# Patient Record
Sex: FEMALE | Race: BLACK OR AFRICAN AMERICAN | Employment: FULL TIME | URBAN - METROPOLITAN AREA
[De-identification: names, ages, dates, MRNs, and addresses within clinical notes are randomized per-mention and may not be internally consistent; named-entity substitution may affect disease eponyms.]

---

## 2017-11-27 ENCOUNTER — HOSPITAL ENCOUNTER (OUTPATIENT)
Dept: MRI IMAGING | Age: 47
Discharge: HOME | End: 2017-11-27
Attending: INTERNAL MEDICINE
Payer: OTHER GOVERNMENT

## 2017-11-27 DIAGNOSIS — M25.569 KNEE PAIN: ICD-10-CM

## 2017-11-27 DIAGNOSIS — M25.469 KNEE EFFUSION: ICD-10-CM

## 2017-11-27 PROCEDURE — 73721 MRI JNT OF LWR EXTRE W/O DYE: CPT

## 2017-12-05 ENCOUNTER — OFFICE VISIT (OUTPATIENT)
Dept: ORTHOPEDIC SURGERY | Age: 47
End: 2017-12-05

## 2017-12-05 VITALS
WEIGHT: 200 LBS | HEIGHT: 64 IN | HEART RATE: 102 BPM | OXYGEN SATURATION: 100 % | TEMPERATURE: 97.1 F | BODY MASS INDEX: 34.15 KG/M2 | RESPIRATION RATE: 18 BRPM | DIASTOLIC BLOOD PRESSURE: 86 MMHG | SYSTOLIC BLOOD PRESSURE: 128 MMHG

## 2017-12-05 DIAGNOSIS — M17.11 PRIMARY OSTEOARTHRITIS OF RIGHT KNEE: Primary | ICD-10-CM

## 2017-12-05 RX ORDER — THYROID, PORCINE 90 MG/1
90 TABLET ORAL DAILY
COMMUNITY
Start: 2017-11-15

## 2017-12-05 RX ORDER — METOPROLOL SUCCINATE 200 MG/1
200 TABLET, EXTENDED RELEASE ORAL DAILY
COMMUNITY
Start: 2017-10-18

## 2017-12-05 RX ORDER — ALBUTEROL SULFATE 90 UG/1
AEROSOL, METERED RESPIRATORY (INHALATION)
COMMUNITY
Start: 2017-08-30

## 2017-12-05 RX ORDER — AMLODIPINE BESYLATE 10 MG/1
10 TABLET ORAL DAILY
COMMUNITY
Start: 2017-11-30

## 2017-12-05 RX ORDER — TRIAMCINOLONE ACETONIDE 40 MG/ML
40 INJECTION, SUSPENSION INTRA-ARTICULAR; INTRAMUSCULAR ONCE
Qty: 1 ML | Refills: 0
Start: 2017-12-05 | End: 2017-12-05

## 2017-12-05 RX ORDER — LIRAGLUTIDE 6 MG/ML
INJECTION SUBCUTANEOUS
COMMUNITY
Start: 2017-11-15 | End: 2020-07-28

## 2017-12-05 RX ORDER — METFORMIN HYDROCHLORIDE 1000 MG/1
1000 TABLET ORAL 2 TIMES DAILY
COMMUNITY
Start: 2017-08-30

## 2017-12-05 RX ORDER — MELOXICAM 15 MG/1
15 TABLET ORAL
Qty: 30 TAB | Refills: 1 | Status: SHIPPED | OUTPATIENT
Start: 2017-12-05 | End: 2020-07-28

## 2017-12-05 RX ORDER — CELECOXIB 200 MG/1
CAPSULE ORAL
COMMUNITY
Start: 2017-11-07 | End: 2020-07-28

## 2017-12-05 RX ORDER — FLUTICASONE PROPIONATE AND SALMETEROL 50; 250 UG/1; UG/1
1 POWDER RESPIRATORY (INHALATION) 2 TIMES DAILY
COMMUNITY
Start: 2017-08-30

## 2017-12-05 NOTE — PROGRESS NOTES
Paulina Barber  1970   Chief Complaint   Patient presents with    Knee Pain     Right        HISTORY OF PRESENT ILLNESS  Paulina Barber is a 51 y.o. female who presents today for evaluation of right knee pain. she rates her pain 5/10 today. Pain has been present since October. Patient describes initial pain occurring while participating in a dance exercise class. She was initially seen at the [facility]. Patient has completed an MRI of the right knee. Patient describes the pain as aching that is Intermittent in nature. Symptoms are worse with Activity, Exercise and is better with  Rest. Associated symptoms include, Swelling. Since problem started, it: is unchanged. Pain does not wake patient up at night. Has taken no pain medications for the problem. H/o right knee lateral release. Patient had a steroid injection in her arm last week. Has tried following treatments: Injections:NO; Brace:NO; Therapy:NO; Cane/Crutch:NO       Allergies   Allergen Reactions    Ace Inhibitors Angioedema    Doxycycline Hives        Past Medical History:   Diagnosis Date    Diabetes ([facility] [address]     Hypertension       Social History     Social History    Marital status: UNKNOWN     Spouse name: N/A    Number of children: N/A    Years of education: N/A     Occupational History    Not on file.      Social History Main Topics    Smoking status: Never Smoker    Smokeless tobacco: Never Used    Alcohol use Yes      Comment: occasionally    Drug use: No    Sexual activity: Not on file     Other Topics Concern    Not on file     Social History Narrative      Past Surgical History:   Procedure Laterality Date    HX ANKLE FRACTURE TX      HX KNEE ARTHROSCOPY        Family History   Problem Relation Age of Onset    Breast Cancer Other 27    Breast Cancer Maternal Aunt 61    Ovarian Cancer Maternal Grandmother 36      Current Outpatient Prescriptions   Medication Sig    VENTOLIN HFA 90 mcg/actuation inhaler     amLODIPine (NORVASC) 5 mg tablet     celecoxib (CELEBREX) 200 mg capsule     ADVAIR DISKUS 250-50 mcg/dose diskus inhaler     VICTOZA 3-HEAVEN 0.6 mg/0.1 mL (18 mg/3 mL) pnij     metFORMIN ER (GLUCOPHAGE XR) 500 mg tablet     metoprolol succinate (TOPROL-XL) 200 mg XL tablet     ARMOUR THYROID 15 mg tablet 45 mg.    meloxicam (MOBIC) 15 mg tablet Take 1 Tab by mouth daily (with breakfast).  triamcinolone acetonide (KENALOG) 40 mg/mL injection 1 mL by IntraMUSCular route once for 1 dose. No current facility-administered medications for this visit. REVIEW OF SYSTEM   Patient denies: Weight loss, Fever/Chills, HA, Visual changes, Fatigue, Chest pain, SOB, Abdominal pain, N/V/D/C, Blood in stool or urine, Edema. Pertinent positive as above in HPI. All others were negative    PHYSICAL EXAM:   Visit Vitals    /86    Pulse (!) 102    Temp 97.1 °F (36.2 °C) (Oral)    Resp 18    Ht 5' 4\" (1.626 m)    Wt 200 lb (90.7 kg)    SpO2 100%    BMI 34.33 kg/m2     The patient is a well-developed, well-nourished female   in no acute distress. The patient is alert and oriented times three. The patient is alert and oriented times three. Mood and affect are normal.  LYMPHATIC: lymph nodes are not enlarged and are within normal limits  SKIN: normal in color and non tender to palpation. There are no bruises or abrasions noted. NEUROLOGICAL: Motor sensory exam is within normal limits. Reflexes are equal bilaterally.  There is normal sensation to pinprick and light touch  MUSCULOSKELETAL:  Examination Right knee   Skin Intact   Range of motion 0-130   Effusion mild   Medial joint line tenderness +   Lateral joint line tenderness -   Tenderness Pes Bursa -   Tenderness insertion MCL -   Tenderness insertion LCL -   Tylers -   Patella crepitus -   Patella grind -   Lachman -   Pivot shift -   Anterior drawer -   Posterior drawer -   Varus stress -   Valgus stress -   Neurovascular Intact   Calf Swelling and Tenderness to Palpation -   Marito's Test -   Hamstring Cord Tightness +         PROCEDURE: After sterile prep, 4 cc of Xylocaine and 1 cc of Kenalog were injected into the right knee. [facility]  OFFICE PROCEDURE PROGRESS NOTE        Chart reviewed for the following:  Priscilla Singh MD, have reviewed the History, Physical and updated the Allergic reactions for [address] performed immediately prior to start of procedure:  Priscilla Singh MD, have performed the following reviews on [place] prior to the start of the procedure:            * Patient was identified by name and date of birth   * Agreement on procedure being performed was verified  * Risks and Benefits explained to the patient  * Procedure site verified and marked as necessary  * Patient was positioned for comfort  * Consent was signed and verified     Time: 9:35 AM    Date of procedure: 12/5/2017    Procedure performed by:  Rojelio Hubbard MD    Provider assisted by: (see medication administration)    How tolerated by patient: tolerated the procedure well with no complications    Comments: none      IMAGING: MRI of right knee dated 11/27/17 was reviewed and read:   IMPRESSION:  1. Small area of flap tear or delamination at the medial condylar cartilage with  no subchondral edema. Degenerative joint disease in medial compartment with  small medial osteophytes. Mild MCL sprain. Intact medial meniscus. 2. Joint effusion. Synovial plicae. 3. Mild red marrow reconversion, chronic anemia not excluded. IMPRESSION:      ICD-10-CM ICD-9-CM    1. Primary osteoarthritis of right knee M17.11 715.16 meloxicam (MOBIC) 15 mg tablet      TRIAMCINOLONE ACETONIDE INJ      triamcinolone acetonide (KENALOG) 40 mg/mL injection      DRAIN/INJECT LARGE JOINT/BURSA      REFERRAL TO PHYSICAL THERAPY        PLAN:  1. I discussed the treatment options based on the MRI.  We will try conservative measures before arthroscopy. Risk factors include: dm, htn  2. Yes cortisone injection indicated today RIGHT KNEE  3. Yes Physical Therapy indicated today   4. No diagnostic test indicated today  5. No durable medical equipment indicated today  6. No referral indicated today   7. Yes medications indicated today MOBIC  8. No Narcotic indicated today for short term acute pain    RTC 4 weeks  Follow-up Disposition: Not on File    Scribed by [facility] [address]) as dictated by Silvia Peoples MD    I, Dr. Chen, confirm that all documentation is accurate.     Silvia Peoples M.D.   [address] and Spine Specialist

## 2017-12-20 ENCOUNTER — HOSPITAL ENCOUNTER (OUTPATIENT)
Dept: PHYSICAL THERAPY | Age: 47
Discharge: HOME | End: 2017-12-20
Payer: OTHER GOVERNMENT

## 2017-12-20 PROCEDURE — 97161 PT EVAL LOW COMPLEX 20 MIN: CPT

## 2017-12-20 PROCEDURE — 97112 NEUROMUSCULAR REEDUCATION: CPT

## 2017-12-20 PROCEDURE — 97110 THERAPEUTIC EXERCISES: CPT

## 2018-01-02 ENCOUNTER — HOSPITAL ENCOUNTER (OUTPATIENT)
Dept: PHYSICAL THERAPY | Age: 48
Discharge: HOME | End: 2018-01-02
Payer: OTHER GOVERNMENT

## 2018-01-02 PROCEDURE — 97110 THERAPEUTIC EXERCISES: CPT

## 2018-01-02 PROCEDURE — 97112 NEUROMUSCULAR REEDUCATION: CPT

## 2018-01-02 NOTE — PROGRESS NOTES
PT DAILY TREATMENT NOTE     Patient Name: Cintia Alarcon  Date:2018  : 1970  [x]  Patient  Verified  Payor:  / Plan: [facility]  RETIREES AND DEPENDENTS / Product Type: [address] [place] [address] /    In [address] [place] [address]  Total Treatment Time (min): 30  Visit #: 2 of 8    Treatment Area: Right knee pain [M25.561]    SUBJECTIVE  Pain Level (0-10 scale): 3  Any medication changes, allergies to medications, adverse drug reactions, diagnosis change, or new procedure performed?: [x] No    [] Yes (see summary sheet for update)  Subjective functional status/changes:   [] No changes reported  Pt reports knee is mainly stiff this morning.      OBJECTIVE    Modality rationale:    Min Type Additional Details    [] Estim:  []Unatt       []IFC  []Premod                        []Other:  []w/ice   []w/heat  Position:  Location:    [] Estim: []Att    []TENS instruct  []NMES                    []Other:  []w/US   []w/ice   []w/heat  Position:  Location:    []  Traction: [] Cervical       []Lumbar                       [] Prone          []Supine                       []Intermittent   []Continuous Lbs:  [] before manual  [] after manual    []  Ultrasound: []Continuous   [] Pulsed                           []1MHz   []3MHz W/cm2:  Location:    []  Iontophoresis with dexamethasone         Location: [] Take home patch   [] In clinic    []  Ice     []  heat  []  Ice massage  []  Laser   []  Anodyne Position:  Location:    []  Laser with stim  []  Other:  Position:  Location:    []  Vasopneumatic Device Pressure:       [] lo [] med [] hi   Temperature: [] lo [] med [] hi   [] Skin assessment post-treatment:  []intact []redness- no adverse reaction    []redness - adverse reaction:         22 min Therapeutic Exercise:  [x] See flow sheet :   Rationale: increase ROM and increase strength to improve the patients ability to perform functional tasks      8 min Neuromuscular Re-education:  [x]  See flow sheet :   Rationale: increase strength, improve balance and increase proprioception  to improve the patients ability to perform functional tasks                With   [] TE   [] TA   [] neuro   [] other: Patient Education: [x] Review HEP    [] Progressed/Changed HEP based on:   [] positioning   [] body mechanics   [] transfers   [] heat/ice application    [] other:      Other Objective/Functional Measures: initiated therex per flow sheet     Pain Level (0-10 scale) post treatment: 3    ASSESSMENT/Changes in Function: pt challenged with stability exercises. Patient will continue to benefit from skilled PT services to modify and progress therapeutic interventions, address functional mobility deficits, address ROM deficits, address strength deficits, analyze and address soft tissue restrictions and analyze and cue movement patterns to attain remaining goals. []  See Plan of Care  []  See progress note/recertification  []  See Discharge Summary         Progress towards goals / Updated goals:  STG 2 weeks:  1. I with HEP - pt with limited compliance 1-1-18  LTG 4 weeks:  1. Improve FOTO to 69 to maximize quality of life. 2. Attain R hip abd and ER to 5/5 to maximize hip control to run  3. Pt will display jogging void of increase pain to return to PLOF  4. PT will report being a little bit of difficulty with hopping to return to PLOF.      PLAN  []  Upgrade activities as tolerated     [x]  Continue plan of care  []  Update interventions per flow sheet       []  Discharge due to:_  []  Other:_      Henrietta Ledezma, PT 1/2/2018  7:04 AM    Future Appointments  Date Time Provider Libia Medel   1/4/2018 7:00 AM [facility]   1/4/2018 8:00 AM MD Delma Yepez [address]   1/8/2018 5:00 PM Kodi Banegas, PT [facility]   1/12/2018 5:30 PM Henrietta Ledezma, PT [facility]   1/16/2018 4:30 PM Arthur Pepe, PT [facility]

## 2018-01-04 ENCOUNTER — APPOINTMENT (OUTPATIENT)
Dept: PHYSICAL THERAPY | Age: 48
End: 2018-01-04
Payer: OTHER GOVERNMENT

## 2018-01-08 ENCOUNTER — HOSPITAL ENCOUNTER (OUTPATIENT)
Dept: PHYSICAL THERAPY | Age: 48
Discharge: HOME | End: 2018-01-08
Payer: OTHER GOVERNMENT

## 2018-01-08 PROCEDURE — 97112 NEUROMUSCULAR REEDUCATION: CPT

## 2018-01-08 PROCEDURE — 97110 THERAPEUTIC EXERCISES: CPT

## 2018-01-08 NOTE — PROGRESS NOTES
PT DAILY TREATMENT NOTE 8-    Patient Name: Jabari Yip  Date:2018  : 1970  [x]  Patient  Verified  Payor:  / Plan: [facility]  RETIREES AND DEPENDENTS / Product Type: Jenniffer Vanegas /    In time:500  Out time:527  Total Treatment Time (min): 27  Visit #: 3 of 8    Treatment Area: Right knee pain [M25.561]    SUBJECTIVE  Pain Level (0-10 scale): 3  Any medication changes, allergies to medications, adverse drug reactions, diagnosis change, or new procedure performed?: [x] No    [] Yes (see summary sheet for update)  Subjective functional status/changes:   [] No changes reported  Continues to report patellar pain    OBJECTIVE    19 min Therapeutic Exercise:  [x] See flow sheet :   Rationale: increase ROM and increase strength to improve the patients ability to increase ease of ADLs  8 min Neuromuscular Re-education:  [x]  See flow sheet :   Rationale: improve coordination and increase proprioception  to improve the patients ability to increase ease of walking '        With   [] TE   [] TA   [] neuro   [] other: Patient Education: [x] Review HEP    [] Progressed/Changed HEP based on:   [] positioning   [] body mechanics   [] transfers   [] heat/ice application    [] other:      Other Objective/Functional Measures: Poor glut max strength noted. Added more glut exercises as per flow sheet.   Decreased resistance with reformer exercises secondary to not being able to control lordotic collapse     Pain Level (0-10 scale) post treatment: 3    ASSESSMENT/Changes in Function: Minimal improvement in pain thus far - continue focus core and hip strengthening    Patient will continue to benefit from skilled PT services to modify and progress therapeutic interventions, address functional mobility deficits, address ROM deficits, address strength deficits, analyze and address soft tissue restrictions, analyze and cue movement patterns, analyze and modify body mechanics/ergonomics and assess and modify postural abnormalities to attain remaining goals. []  See Plan of Care  []  See progress note/recertification  []  See Discharge Summary         Progress towards goals / Updated goals:  1. I with HEP - pt with limited compliance 1-1-18  LTG 4 weeks:  1. Improve FOTO to 69 to maximize quality of life. 2. Attain R hip abd and ER to 5/5 to maximize hip control to run  3. Pt will display jogging void of increase pain to return to PLOF  4. PT will report being a little bit of difficulty with hopping to return to PLOF.      PLAN  []  Upgrade activities as tolerated     [x]  Continue plan of care  []  Update interventions per flow sheet       []  Discharge due to:_  []  Other:_      Ryan Black, PT 1/8/2018  5:09 PM    Future Appointments  Date Time Provider Libia Medel   1/12/2018 5:30 PM Taylor Melo, PT [facility]   1/16/2018 4:30 PM Erica Angel PT [facility]   1/17/2018 8:00 AM MD Delma Fair [address]   1/19/2018 5:30 PM Taylor Melo, PT [facility]

## 2018-01-12 ENCOUNTER — APPOINTMENT (OUTPATIENT)
Dept: PHYSICAL THERAPY | Age: 48
End: 2018-01-12
Payer: OTHER GOVERNMENT

## 2018-01-15 ENCOUNTER — HOSPITAL ENCOUNTER (OUTPATIENT)
Dept: PHYSICAL THERAPY | Age: 48
End: 2018-01-15
Payer: OTHER GOVERNMENT

## 2018-01-16 ENCOUNTER — HOSPITAL ENCOUNTER (OUTPATIENT)
Dept: PHYSICAL THERAPY | Age: 48
Discharge: HOME | End: 2018-01-16
Payer: OTHER GOVERNMENT

## 2018-01-16 PROCEDURE — 97110 THERAPEUTIC EXERCISES: CPT

## 2018-01-16 PROCEDURE — 97112 NEUROMUSCULAR REEDUCATION: CPT

## 2018-01-16 NOTE — PROGRESS NOTES
PT DAILY TREATMENT NOTE     Patient Name: Orquidea Draper  Date:2018  : 1970  [x]  Patient  Verified  Payor:  / Plan: [facility]  RETIREES AND DEPENDENTS / Product Type: [place] [employer] /    In time:4:42  Out time:5:34  Total Treatment Time (min): 52  Visit #: 4 of 8     Treatment Area: Right knee pain [M25.561]    SUBJECTIVE  Pain Level (0-10 scale): 0/10  Any medication changes, allergies to medications, adverse drug reactions, diagnosis change, or new procedure performed?: [x] No    [] Yes (see summary sheet for update)  Subjective functional status/changes:   [] No changes reported  The patient states that her R knee has improved. Slow progress     OBJECTIVE  Modality rationale: decrease edema, decrease inflammation and decrease pain to improve the patients ability to improve ADL ease. Min Type Additional Details   10 [x]  Vasopneumatic Device Pressure:       [x] lo [] med [] hi   Temperature: [x] lo [] med [] hi   [] Skin assessment post-treatment:  []intact []redness- no adverse reaction    []redness - adverse reaction:     32 min Therapeutic Exercise:  [x] See flow sheet :   Rationale: increase ROM and increase strength to improve the patients ability to improve ADL ease. 10 min Neuromuscular Re-education:  []  See flow sheet :   Rationale: increase ROM and increase strength  to improve the patients ability to improve ADL ease. With   [] TE   [] TA   [] neuro   [] other: Patient Education: [x] Review HEP    [] Progressed/Changed HEP based on:   [] positioning   [] body mechanics   [] transfers   [] heat/ice application    [] other:      Other Objective/Functional Measures:   Progressed into line hops. The patient significantly challenged with reformer exercises and bandwalks. Pain Level (0-10 scale) post treatment: 2/10    ASSESSMENT/Changes in Function: Fairly good progress towards dynamic activity continue progress strengthening and stability.     Patient will continue to benefit from skilled PT services to modify and progress therapeutic interventions, address functional mobility deficits, address ROM deficits, address strength deficits, analyze and address soft tissue restrictions, analyze and cue movement patterns, analyze and modify body mechanics/ergonomics, assess and modify postural abnormalities and instruct in home and community integration to attain remaining goals. []  See Plan of Care  []  See progress note/recertification  []  See Discharge Summary         Progress towards goals / Updated goals:  1. I with HEP - pt with limited compliance 1-1-18  LTG 4 weeks:  1. Improve FOTO to 69 to maximize quality of life. 2. Attain R hip abd and ER to 5/5 to maximize hip control to run  3. Pt will display jogging void of increase pain to return to PLOF  4.  PT will report having a little bit of difficulty with hopping to return to PLOF.        PLAN  []  Upgrade activities as tolerated     [x]  Continue plan of care  []  Update interventions per flow sheet       []  Discharge due to:_  []  Other:_      Tommy Rivera, PT 1/16/2018  5:30 PM    Future Appointments  Date Time Provider Libia Medel   1/17/2018 8:00 AM Julio Barajas MD [facility] [address]   1/19/2018 5:30 PM Jennifer Quinones, PT [facility]

## 2018-01-17 ENCOUNTER — OFFICE VISIT (OUTPATIENT)
Dept: ORTHOPEDIC SURGERY | Age: 48
End: 2018-01-17

## 2018-01-17 VITALS
BODY MASS INDEX: 34.66 KG/M2 | SYSTOLIC BLOOD PRESSURE: 152 MMHG | HEART RATE: 101 BPM | OXYGEN SATURATION: 99 % | RESPIRATION RATE: 16 BRPM | DIASTOLIC BLOOD PRESSURE: 96 MMHG | WEIGHT: 203 LBS | HEIGHT: 64 IN

## 2018-01-17 DIAGNOSIS — M17.11 PRIMARY OSTEOARTHRITIS OF RIGHT KNEE: Primary | ICD-10-CM

## 2018-01-17 NOTE — PROGRESS NOTES
Rj Barnard  1970   Chief Complaint   Patient presents with    Follow-up     Rt        HISTORY OF PRESENT ILLNESS  Rj Barnard is a 49 y.o. female who presents today for reevaluation of right knee pain. Patient rates pain as 3/10 today. At last OV, patient had a cortisone injection which provided relief for a few days. She was also given a prescription for Mobic. She has been attending PT. Reports having increased pain today following an intense PT session yesterday. Day to day she has a constant, aching pain that is worse when she isn't active. Also reports left knee and ankle pain. Patient denies any fever, chills, chest pain, shortness of breath or calf pain. There are no new illness or injuries to report since last seen in the office. There are no changes to medications, allergies, family or social history. PHYSICAL EXAM:   Visit Vitals    BP (!) 152/96 (BP 1 Location: Left arm, BP Patient Position: Sitting)    Pulse (!) 101    Resp 16    Ht 5' 4\" (1.626 m)    Wt 203 lb (92.1 kg)    SpO2 99%    BMI 34.84 kg/m2     The patient is a well-developed, well-nourished female   in no acute distress. The patient is alert and oriented times three. The patient is alert and oriented times three. Mood and affect are normal.  LYMPHATIC: lymph nodes are not enlarged and are within normal limits  SKIN: normal in color and non tender to palpation. There are no bruises or abrasions noted. NEUROLOGICAL: Motor sensory exam is within normal limits. Reflexes are equal bilaterally.  There is normal sensation to pinprick and light touch  MUSCULOSKELETAL:  Examination Right knee   Skin Intact   Range of motion 0-130   Effusion mild   Medial joint line tenderness +   Lateral joint line tenderness -   Tenderness Pes Bursa -   Tenderness insertion MCL -   Tenderness insertion LCL -   Tylers -   Patella crepitus -   Patella grind -   Lachman -   Pivot shift -   Anterior drawer -   Posterior drawer -   Varus stress -   Valgus stress -   Neurovascular Intact   Calf Swelling and Tenderness to Palpation -   Marito's Test -   Hamstring Cord Tightness +       IMAGING: MRI of right knee dated 11/27/17 was reviewed and read:   IMPRESSION:  1. Small area of flap tear or delamination at the medial condylar cartilage with  no subchondral edema. Degenerative joint disease in medial compartment with  small medial osteophytes. Mild MCL sprain. Intact medial meniscus. 2. Joint effusion. Synovial plicae. 3. Mild red marrow reconversion, chronic anemia not excluded. IMPRESSION:      ICD-10-CM ICD-9-CM    1. Primary osteoarthritis of right knee M17.11 715.16 PROCEDURE AUTHORIZATION TO         PLAN:   1. Patient received limited relief from previous cortisone injection in the right knee. I discussed proceeding with visco supplementation. We will seek authorization for Euflexxa. Risk factors include: dm, htn  2. No cortisone injection indicated today   3. No Physical/Occupational Therapy indicated today  4. No diagnostic test indicated today  5. No durable medical equipment indicated today  6. No referral indicated today   7. No medications indicated today  8.  No Narcotic indicated today     RTC following Euflexxa auth  Follow-up Disposition: Not on File    Scribed by Pamela Jackson [address]) as dictated by Secundino Aase, PA-C Secundino Aase, [address] and Spine Specialist

## 2018-01-19 ENCOUNTER — HOSPITAL ENCOUNTER (OUTPATIENT)
Dept: PHYSICAL THERAPY | Age: 48
Discharge: HOME | End: 2018-01-19
Payer: OTHER GOVERNMENT

## 2018-01-19 PROCEDURE — 97112 NEUROMUSCULAR REEDUCATION: CPT

## 2018-01-19 NOTE — PROGRESS NOTES
PT DAILY TREATMENT NOTE     Patient Name: Oscar Wallace  Date:2018  : 1970  [x]  Patient  Verified  Payor:  / Plan: [facility] DEPENDENTS / Product Type:  /    In time: 5:34 Out time:6:14  Total Treatment Time (min): 40  1:1 treatment: 10 min. Visit #: 5 of 8     Treatment Area: Right knee pain [M25.561]    SUBJECTIVE  Pain Level (0-10 scale): 1/10  Any medication changes, allergies to medications, adverse drug reactions, diagnosis change, or new procedure performed?: [x] No    [] Yes (see summary sheet for update)  Subjective functional status/changes:   [] No changes reported  Pt reports slow improvement. OBJECTIVE    30 min Therapeutic Exercise:  [x] See flow sheet :   Rationale: increase ROM and increase strength to improve the patients ability to improve ADL ease. 10 min Neuromuscular Re-education:  [x]  See flow sheet :   Rationale: increase ROM and increase strength  to improve the patients ability to improve ADL ease. With   [] TE   [] TA   [] neuro   [] other: Patient Education: [x] Review HEP    [] Progressed/Changed HEP based on:   [] positioning   [] body mechanics   [] transfers   [] heat/ice application    [] other:      Other Objective/Functional Measures: FOTO: 70    Pain Level (0-10 scale) post treatment: 1/10    ASSESSMENT/Changes in Function: pt with gradual progress with PT with strength and stability. She will benefit from PT to further functional progress. Patient will continue to benefit from skilled PT services to modify and progress therapeutic interventions, address functional mobility deficits, address ROM deficits, address strength deficits, analyze and address soft tissue restrictions, analyze and cue movement patterns, analyze and modify body mechanics/ergonomics, assess and modify postural abnormalities and instruct in home and community integration to attain remaining goals.      []  See Plan of Care  [x]  See progress note/recertification  []  See Discharge Summary         Progress towards goals / Updated goals:  1. I with HEP - pt with limited compliance 1-1-18  LTG 4 weeks:  1. Improve FOTO to 69 to maximize quality of life. - MET 70 1-19-18  2. Attain R hip abd and ER to 5/5 to maximize hip control to run - progressing 1-19-18  3. Pt will display jogging void of increase pain to return to PLOF - not yet met 1-19-18  4. PT will report having a little bit of difficulty with hopping to return to PLOF.  - not yet met 1-19-18       PLAN  []  Upgrade activities as tolerated     [x]  Continue plan of care  []  Update interventions per flow sheet       []  Discharge due to:_  []  Other:_      Leopoldo Gasman, PT 1/19/2018  6:38 PM    Future Appointments  Date Time Provider Libia Medel   1/23/2018 6:00 PM [id number] [facility]   1/24/2018 6:00 PM Leopoldo Gasman, PT [facility]   1/30/2018 6:00 PM [id number] [place] [address] [facility]   2/2/2018 6:00 PM Renard Thorne [facility]

## 2018-01-19 NOTE — PROGRESS NOTES
[facility]  [address]  [place], [address]  [phone] fax    Physical Therapy Progress Note  Patient name: Shruthi Allen Start of Care: 17   Referral source: James Abreu MD : 1970   Medical/Treatment Diagnosis: Right knee pain [M25.561] Onset Date:chornic     Prior Hospitalization: see medical history Provider#: [id number]   Medications: Verified on Patient Summary List    Comorbidities: L ankle fx, B knee scope, diabetes, OA  Prior Level of Function:able to exercise in gym  Visits from Start of Care: 5    Missed Visits: 2        Key Functional Changes:  pt with gradual progress with PT with strength and stability. She will benefit from PT to further functional progress.      Progress towards goals:  1. I with HEP - pt with limited compliance 18  LTG 4 weeks:  1. Improve FOTO to 69 to maximize quality of life. - MET 71 18  2. Attain R hip abd and ER to 5/5 to maximize hip control to run - progressing 18  3. Pt will display jogging void of increase pain to return to PLOF - not yet met 18  4. PT will report having a little bit of difficulty with hopping to return to PLOF. - not yet met 18       Updated Goals: to be achieved in 2 weeks:  1. Attain R hip abd and ER to 5/5 to maximize hip control to run - progressing 18  2. Pt will display jogging void of increase pain to return to PLOF - not yet met 18  3. PT will report having a little bit of difficulty with hopping to return to PLOF.  - not yet met 18     ASSESSMENT/RECOMMENDATIONS:  [x]Continue therapy per initial plan/protocol at a frequency of  2 x per week for 2 weeks  []Continue therapy with the following recommended changes:_____________________      _____________________________________________________________________  []Discontinue therapy progressing towards or have reached established goals  []Discontinue therapy due to lack of appreciable progress towards goals  []Discontinue therapy due to lack of attendance or compliance  []Await Physician's recommendations/decisions regarding therapy  []Other:________________________________________________________________    Thank you for this referral.    Zohaib Augustine, PT 1/19/2018 6:47 PM  NOTE TO PHYSICIAN:  PLEASE COMPLETE THE ORDERS BELOW AND   FAX TO [facility]: [phone]  If you are unable to process this request in 24 hours please contact our office: [phone]    ? I have read the above report and request that my patient continue as recommended. ? I have read the above report and request that my patient continue therapy with the following changes/special instructions:__________________________________________________________  ? I have read the above report and request that my patient be discharged from therapy.     Physicians signature: ______________________________Date: ______Time:______

## 2018-01-23 ENCOUNTER — HOSPITAL ENCOUNTER (OUTPATIENT)
Dept: PHYSICAL THERAPY | Age: 48
Discharge: HOME | End: 2018-01-23
Payer: OTHER GOVERNMENT

## 2018-01-23 PROCEDURE — 97112 NEUROMUSCULAR REEDUCATION: CPT

## 2018-01-23 PROCEDURE — 97110 THERAPEUTIC EXERCISES: CPT

## 2018-01-23 PROCEDURE — 97016 VASOPNEUMATIC DEVICE THERAPY: CPT

## 2018-01-23 NOTE — PROGRESS NOTES
PT DAILY TREATMENT NOTE     Patient Name: Bel Almonte  Date:2018  : 1970  [x]  Patient  Verified  Payor:  / Plan: [facility]  RETIREES AND DEPENDENTS / Product Type: Ian Ximena /    In time:6:01 Out time:6:48  Total Treatment Time (min): 47  1:1 Time: 30  Visit #: 1 of 4    Treatment Area: Right knee pain [M25.561]    SUBJECTIVE  Pain Level (0-10 scale): 0 \"stiff\"  Any medication changes, allergies to medications, adverse drug reactions, diagnosis change, or new procedure performed?: [x] No    [] Yes (see summary sheet for update)  Subjective functional status/changes:   [] No changes reported  \"It was just stiff this morning.  I did more this weekend than I've been doing lately, I don't know if that's got something to do with it \"    OBJECTIVE    Modality rationale: decrease inflammation and decrease pain to improve the patients ability to perform ADLs   Min Type Additional Details    [] Estim:  []Unatt       []IFC  []Premod                        []Other:  []w/ice   []w/heat  Position:  Location:    [] Estim: []Att    []TENS instruct  []NMES                    []Other:  []w/US   []w/ice   []w/heat  Position:  Location:    []  Traction: [] Cervical       []Lumbar                       [] Prone          []Supine                       []Intermittent   []Continuous Lbs:  [] before manual  [] after manual    []  Ultrasound: []Continuous   [] Pulsed                           []1MHz   []3MHz W/cm2:  Location:    []  Iontophoresis with dexamethasone         Location: [] Take home patch   [] In clinic    []  Ice     []  heat  []  Ice massage  []  Laser   []  Anodyne Position:  Location:    []  Laser with stim  []  Other:  Position:  Location:   10 [x]  Vasopneumatic Device Pressure:       [x] lo [] med [] hi   Temperature: [x] lo [] med [] hi   [] Skin assessment post-treatment:  []intact []redness- no adverse reaction    []redness - adverse reaction:       27 min Therapeutic Exercise:  [] See flow sheet :   Rationale: increase ROM and increase strength to improve the patients ability to perform daily tasks      10 min Neuromuscular Re-education:  []  See flow sheet :   Rationale: increase strength and improve coordination  to improve the patients ability to perform recreational activities        With   [] TE   [] TA   [] neuro   [] other: Patient Education: [x] Review HEP    [] Progressed/Changed HEP based on:   [] positioning   [] body mechanics   [] transfers   [] heat/ice application    [] other:      Other Objective/Functional Measures:  Pt reports a slight \"twinge\" of lateral R knee discomfort with lateral line hops     Pain Level (0-10 scale) post treatment: 1 \"stiff\"    ASSESSMENT/Changes in Function: Added second set of line hops today, pt reports some low grade knee discomfort on second set. Minimal pain at end of session, she still reports mostly stiffness. Attempt jogging next session based on pt pain level. Patient will continue to benefit from skilled PT services to modify and progress therapeutic interventions, address functional mobility deficits, address ROM deficits, address strength deficits, analyze and address soft tissue restrictions, analyze and cue movement patterns and analyze and modify body mechanics/ergonomics to attain remaining goals. []  See Plan of Care  []  See progress note/recertification  []  See Discharge Summary         Updated Goals: to be achieved in 2 weeks:  1. Attain R hip abd and ER to 5/5 to maximize hip control to run - progressing 1-19-18; partially met 5/5 ER 4+/5 ABD 1/23/18  2. Pt will display jogging void of increase pain to return to PLOF - not yet met 1-19-18  3. PT will report having a little bit of difficulty with hopping to return to PLOF.  - not yet met 1-19-18     PLAN  [x]  Upgrade activities as tolerated     []  Continue plan of care  []  Update interventions per flow sheet       []  Discharge due to:_  []  Other:_      Dong Scale, DPT, CMTPT 1/23/2018  6:09 PM    Future Appointments  Date Time Provider Libia Lizy   1[facility]24/2018 6:00 PM [facility]   1/30/2018 6:00 PM [id number] [facility]   2/2/2018 6:00 PM Evonnie Denver [facility]

## 2018-01-24 ENCOUNTER — APPOINTMENT (OUTPATIENT)
Dept: PHYSICAL THERAPY | Age: 48
End: 2018-01-24
Payer: OTHER GOVERNMENT

## 2018-01-25 ENCOUNTER — OFFICE VISIT (OUTPATIENT)
Dept: ORTHOPEDIC SURGERY | Age: 48
End: 2018-01-25

## 2018-01-25 VITALS
WEIGHT: 196 LBS | TEMPERATURE: 98.3 F | HEART RATE: 75 BPM | BODY MASS INDEX: 33.46 KG/M2 | OXYGEN SATURATION: 100 % | HEIGHT: 64 IN | SYSTOLIC BLOOD PRESSURE: 130 MMHG | DIASTOLIC BLOOD PRESSURE: 80 MMHG | RESPIRATION RATE: 16 BRPM

## 2018-01-25 DIAGNOSIS — M17.11 PRIMARY OSTEOARTHRITIS OF RIGHT KNEE: Primary | ICD-10-CM

## 2018-01-25 RX ORDER — METHYLPREDNISOLONE 4 MG/1
TABLET ORAL
Qty: 21 DOSE PACK | Refills: 0 | Status: SHIPPED | OUTPATIENT
Start: 2018-01-25 | End: 2020-07-28

## 2018-01-25 RX ORDER — FERROUS SULFATE, DRIED 160(50) MG
1 TABLET, EXTENDED RELEASE ORAL 2 TIMES DAILY WITH MEALS
COMMUNITY
End: 2020-07-28

## 2018-01-25 NOTE — PROGRESS NOTES
Gerard Potts  1970   Chief Complaint   Patient presents with    Knee Pain     Right        HISTORY OF PRESENT ILLNESS  Gerard Potts is a 49 y.o. female who presents today for reevaluation of right knee pain. Patient rates pain as 8/10 today. Her Euflexxa injections have not yet been approved. She has tried previous cortisone injections which provided limited relief. She has been attending PT. Recalls an incident after PT when she had more intense pain. It aches and she has soreness. Patient denies any fever, chills, chest pain, shortness of breath or calf pain. There are no new illness or injuries to report since last seen in the office. There are no changes to medications, allergies, family or social history. PHYSICAL EXAM:   Visit Vitals    /80    Pulse 75    Temp 98.3 °F (36.8 °C) (Oral)    Resp 16    Ht 5' 4\" (1.626 m)    Wt 196 lb (88.9 kg)    SpO2 100%    BMI 33.64 kg/m2     The patient is a well-developed, well-nourished female   in no acute distress. The patient is alert and oriented times three. The patient is alert and oriented times three. Mood and affect are normal.  LYMPHATIC: lymph nodes are not enlarged and are within normal limits  SKIN: normal in color and non tender to palpation. There are no bruises or abrasions noted. NEUROLOGICAL: Motor sensory exam is within normal limits. Reflexes are equal bilaterally.  There is normal sensation to pinprick and light touch  MUSCULOSKELETAL:  Examination Right knee   Skin Intact   Range of motion 0-130   Effusion mild   Medial joint line tenderness +   Lateral joint line tenderness -   Tenderness Pes Bursa -   Tenderness insertion MCL -   Tenderness insertion LCL -   Tylers -   Patella crepitus -   Patella grind -   Lachman -   Pivot shift -   Anterior drawer -   Posterior drawer -   Varus stress -   Valgus stress -   Neurovascular Intact   Calf Swelling and Tenderness to Palpation -   Marito's Test -   Hamstring Cord Tightness +       IMAGING: MRI of right knee dated 11/27/17 was reviewed and read:   IMPRESSION:  1. Small area of flap tear or delamination at the medial condylar cartilage with  no subchondral edema. Degenerative joint disease in medial compartment with  small medial osteophytes. Mild MCL sprain. Intact medial meniscus. 2. Joint effusion. Synovial plicae. 3. Mild red marrow reconversion, chronic anemia not excluded. IMPRESSION:      ICD-10-CM ICD-9-CM    1. Primary osteoarthritis of right knee M17.11 715.16 methylPREDNISolone (MEDROL DOSEPACK) 4 mg tablet        PLAN:   1. Patient will continue to wait for Euflexxa authorization. In the meantime, we will try to reduce her symptoms. Risk factors include: dm, htn  2. No cortisone injection indicated today   3. No Physical/Occupational Therapy indicated today  4. No diagnostic test indicated today  5. No durable medical equipment indicated today  6. No referral indicated today   7. Yes medications indicated today MDP  8.  No Narcotic indicated today     RTC following Euflexxa auth  Follow-up Disposition: Not on File    Scribed by Vanna Layne [address]) as dictated by SAVANNA Garcia [address] and [facility] Specialist

## 2018-01-30 ENCOUNTER — OFFICE VISIT (OUTPATIENT)
Dept: ORTHOPEDIC SURGERY | Age: 48
End: 2018-01-30

## 2018-01-30 ENCOUNTER — HOSPITAL ENCOUNTER (OUTPATIENT)
Dept: PHYSICAL THERAPY | Age: 48
End: 2018-01-30
Payer: OTHER GOVERNMENT

## 2018-01-30 VITALS
HEART RATE: 94 BPM | OXYGEN SATURATION: 97 % | WEIGHT: 199.6 LBS | TEMPERATURE: 97.5 F | SYSTOLIC BLOOD PRESSURE: 147 MMHG | BODY MASS INDEX: 34.08 KG/M2 | DIASTOLIC BLOOD PRESSURE: 94 MMHG | HEIGHT: 64 IN

## 2018-01-30 DIAGNOSIS — M17.11 PRIMARY OSTEOARTHRITIS OF RIGHT KNEE: Primary | ICD-10-CM

## 2018-01-30 RX ORDER — HYALURONATE SODIUM 10 MG/ML
2 SYRINGE (ML) INTRAARTICULAR ONCE
Qty: 2 ML | Refills: 0
Start: 2018-01-30 | End: 2018-01-30

## 2018-01-30 NOTE — PROGRESS NOTES
Patient: Laron Gaines                MRN: [id number]       SSN: [id number]  YOB: 1970        AGE: 49 y.o. SEX: female  Body mass index is 34.26 kg/(m^2). PCP: Barbie Greco MD  01/30/18    Chief Complaint   Patient presents with    Knee Pain     right       HISTORY:  Laron Gaines is a 49 y.o. female who is seen for reevaluation of Right knee here for 1st injection of euflexxa. PROCEDURE:  Patient's Right knee, after timeout under sterile conditions, was injected with 2 cc of Euflexxa. [facility]  OFFICE PROCEDURE PROGRESS NOTE        Chart reviewed for the following:   Chris Li MD, have reviewed the History, Physical and updated the Allergic reactions for [address] performed immediately prior to start of procedure:   Chris Li MD, have performed the following reviews on Laron Gaines prior to the start of the procedure:            * Patient was identified by name and date of birth   * Agreement on procedure being performed was verified  * Risks and Benefits explained to the patient  * Procedure site verified and marked as necessary  * Patient was positioned for comfort  * Consent was signed and verified     Time: 4:30 PM       Date of procedure: 1/30/2018    Procedure performed by:  Kim Post MD    Provider assisted by: None     How tolerated by patient: tolerated the procedure well with no complications    Comments: none    IMPRESSION:     ICD-10-CM ICD-9-CM    1. Primary osteoarthritis of right knee M17.11 715.16 PA DRAIN/INJECT LARGE JOINT/BURSA      EUFLEXXA INJECTION PER DOSE      sodium hyaluronate (SUPARTZ FX/HYALGAN/GENIVSC) 10 mg/mL syrg injection        PLAN:  Ms. Rome Gant will return in one week for her second Euflexxa injection.       Scribed by Altagracia Jean Baptiste ([address]) as dictated by Kim Post MD    I, Dr. Kim Post, confirm that all documentation is accurate.     Alcides Carrizales M.D.   [facility]

## 2018-01-30 NOTE — MR AVS SNAPSHOT
[address], [address] [facility] [address] 
[phone] Patient: Dirk Davenport MRN: [id number] :1970 Visit Information Date & Time Provider Department Dept. Phone Encounter #  
 2018  4:10 PM Clarisa Montez MD [facility] [phone] Your Appointments 2018  4:30 PM  
Follow Up with Clarisa Montez MD  
[address] [place] [address], [address] and [facility] - [facility] ([address]) Appt Note: euflexxa 2/3 - rt knee [address], [address] [facility] [address]  
[phone] [address] [facility]  
  
    
 2018  4:30 PM  
Follow Up with Clarisa Montez MD  
[address] [place] [address], [address] and [facility] - [facility] ([address]) Appt Note: euflexxa 3/3 - rt knee [address], [address] [facility]  
[phone] Upcoming Health Maintenance Date Due DTaP/Tdap/Td series (1 - Tdap) 1990 PAP AKA CERVICAL CYTOLOGY 2015 Influenza Age 5 to Adult 2017 Allergies as of 2018  Review Complete On: 2018 By: Clarisa Montez MD  
  
 Severity Noted Reaction Type Reactions Ace Inhibitors  2017    Angioedema Doxycycline  2017    Hives Current Immunizations  Never Reviewed No immunizations on file. Not reviewed this visit You Were Diagnosed With   
  
 Codes Comments Primary osteoarthritis of right knee    -  Primary ICD-10-CM: M17.11 ICD-9-CM: 715.16 Vitals BP Pulse Temp Height(growth percentile) Weight(growth percentile) SpO2  
 (!) 147/94 94 97.5 °F (36.4 °C) (Oral) 5' 4\" (1.626 m) 199 lb 9.6 oz (90.5 kg) 97% BMI OB Status Smoking Status 34.26 kg/m2 Having regular periods Never Smoker BMI and BSA Data  Body Mass Index Body Surface Area  
 34.26 kg/m 2 2.02 m 2  
  
 Preferred Pharmacy Pharmacy Name Phone [facility] [address] [facility] [phone] Your Updated Medication List  
  
   
This list is accurate as of: 1/30/18  4:34 PM.  Always use your most recent med list.  
  
  
  
  
 Calvin Holiday 250-50 mcg/dose diskus inhaler Generic drug:  fluticasone-salmeterol  
  
 amLODIPine 5 mg tablet Commonly known as:  NORVASC  
  
 ARMOUR THYROID 15 mg tablet Generic drug:  thyroid (Pork) 45 mg.  
  
 calcium-vitamin D 500 mg(1,250mg) -200 unit per tablet Commonly known as:  OYSTER SHELL Take 1 Tab by mouth two (2) times daily (with meals). celecoxib 200 mg capsule Commonly known as:  CELEBREX  
  
 meloxicam 15 mg tablet Commonly known as:  MOBIC Take 1 Tab by mouth daily (with breakfast). metFORMIN  mg tablet Commonly known as:  GLUCOPHAGE XR  
  
 methylPREDNISolone 4 mg tablet Commonly known as:  [address] Per dose pack instructions  
  
 metoprolol succinate 200 mg XL tablet Commonly known as:  TOPROL-XL  
  
 sodium hyaluronate 10 mg/mL Syrg injection Commonly known as:  SUPARTZ FX/HYALGAN/GENIVSC 2 mL by Intra artICUlar route once for 1 dose. VENTOLIN HFA 90 mcg/actuation inhaler Generic drug:  albuterol VICTOZA 3-HEAVEN 0.6 mg/0.1 mL (18 mg/3 mL) Pnij Generic drug:  Liraglutide We Performed the Following EUFLEXXA INJECTION PER DOSE [ [facility]] ME DRAIN/INJECT LARGE JOINT/BURSA [id number] CPT(R)] To-Do List   
 01/30/2018  6:00 PM  
  Appointment with Tawnya Day at [facility]  [facility] ([phone])  
  
 02/02/2018 6:00 PM  
  Appointment with Tawnya Day at [facility] ([phone]) [facility]! Dear Joe Ley: 
Thank you for requesting a AppSenset account. Our records indicate that you already have an active AppSenset account.   You can access your account anytime at [email or web address] Did you know that you can access your hospital and ER discharge instructions at any time in Optisort? You can also review all of your test results from your hospital stay or ER visit. Additional Information If you have questions, please visit the Frequently Asked Questions section of the Optisort website at [email or web address] Remember, Optisort is NOT to be used for urgent needs. For medical emergencies, dial 911. Now available from your iPhone and Android! Please provide this summary of care documentation to your next provider. Your primary care clinician is listed as [address] [place] [address]. If you have any questions after today's visit, please call [phone].

## 2018-02-02 ENCOUNTER — APPOINTMENT (OUTPATIENT)
Dept: PHYSICAL THERAPY | Age: 48
End: 2018-02-02

## 2018-02-05 ENCOUNTER — APPOINTMENT (OUTPATIENT)
Dept: PHYSICAL THERAPY | Age: 48
End: 2018-02-05

## 2018-02-06 ENCOUNTER — OFFICE VISIT (OUTPATIENT)
Dept: ORTHOPEDIC SURGERY | Age: 48
End: 2018-02-06

## 2018-02-06 VITALS
SYSTOLIC BLOOD PRESSURE: 141 MMHG | BODY MASS INDEX: 33.97 KG/M2 | HEART RATE: 89 BPM | OXYGEN SATURATION: 99 % | WEIGHT: 199 LBS | HEIGHT: 64 IN | TEMPERATURE: 97.1 F | DIASTOLIC BLOOD PRESSURE: 92 MMHG

## 2018-02-06 DIAGNOSIS — M17.11 PRIMARY OSTEOARTHRITIS OF RIGHT KNEE: Primary | ICD-10-CM

## 2018-02-06 RX ORDER — HYALURONATE SODIUM 10 MG/ML
2 SYRINGE (ML) INTRAARTICULAR ONCE
Qty: 2 ML | Refills: 0
Start: 2018-02-06 | End: 2018-02-07

## 2018-02-06 NOTE — PROGRESS NOTES
Patient: Marzena Donovan                MRN: [id number]       SSN: [id number]  YOB: 1970        AGE: 49 y.o. SEX: female  Body mass index is 34.16 kg/(m^2). PCP: Jeannie Solis MD  02/06/18    Chief Complaint   Patient presents with    Knee Pain     right ,Euflexxa #2       HISTORY:  Marzena Donovan is a 49 y.o. female who is seen for reevaluation of Right knee here for 2nd injection of euflexxa. PROCEDURE:  Patient's Right knee, after timeout under sterile conditions, was injected with 2 cc of Euflexxa. [facility]  OFFICE PROCEDURE PROGRESS NOTE        Chart reviewed for the following:   Patricia Tony MD, have reviewed the History, Physical and updated the Allergic reactions for [address] performed immediately prior to start of procedure:   Patricia Tony MD, have performed the following reviews on Marzena Donovan prior to the start of the procedure:            * Patient was identified by name and date of birth   * Agreement on procedure being performed was verified  * Risks and Benefits explained to the patient  * Procedure site verified and marked as necessary  * Patient was positioned for comfort  * Consent was signed and verified     Time: 4:50 PM    Date of procedure: 2/6/2018    Procedure performed by:  Patricia Barbosa MD    Provider assisted by: None     How tolerated by patient: tolerated the procedure well with no complications    Comments: none    IMPRESSION:     ICD-10-CM ICD-9-CM    1. Primary osteoarthritis of right knee M17.11 715.16 VT DRAIN/INJECT LARGE JOINT/BURSA      EUFLEXXA INJECTION PER DOSE      sodium hyaluronate (SUPARTZ FX/HYALGAN/GENIVSC) 10 mg/mL syrg injection        PLAN:  Ms. Patricia Oden will return in one week for her third Euflexxa injection.       Scribed by Kina Bear ([address]) as dictated by Patricia Barbosa MD    I, Dr. Patricia Barbosa, confirm that all documentation is accurate.     Saman Claudio M.D.   [facility]

## 2018-02-07 RX ORDER — HYALURONATE SODIUM 10 MG/ML
2 SYRINGE (ML) INTRAARTICULAR ONCE
Qty: 2 ML | Refills: 0
Start: 2018-02-07 | End: 2018-02-07

## 2018-02-13 ENCOUNTER — OFFICE VISIT (OUTPATIENT)
Dept: ORTHOPEDIC SURGERY | Age: 48
End: 2018-02-13

## 2018-02-13 DIAGNOSIS — M17.11 PRIMARY OSTEOARTHRITIS OF RIGHT KNEE: Primary | ICD-10-CM

## 2018-02-13 RX ORDER — HYALURONATE SODIUM 10 MG/ML
2 SYRINGE (ML) INTRAARTICULAR ONCE
Qty: 2 ML | Refills: 0
Start: 2018-02-13 | End: 2018-02-13

## 2018-02-13 NOTE — PROGRESS NOTES
Patient: Ziyad Rivera                MRN: [id number]       SSN: [id number]  YOB: 1970        AGE: 49 y.o. SEX: female  There is no height or weight on file to calculate BMI. PCP: Kylie Dove MD  02/13/18    No chief complaint on file. HISTORY:  Ziyad Rivera is a 49 y.o. female who is seen for reevaluation of Right knee here for 3rd and final injection of euflexxa. PROCEDURE:  Patient's Right knee, after timeout under sterile conditions, was injected with 2 cc of Euflexxa. [facility]  OFFICE PROCEDURE PROGRESS NOTE        Chart reviewed for the following:   Merle Maxwell MD, have reviewed the History, Physical and updated the Allergic reactions for [address] performed immediately prior to start of procedure:   Merle Maxwell MD, have performed the following reviews on Ziyad Rivera prior to the start of the procedure:            * Patient was identified by name and date of birth   * Agreement on procedure being performed was verified  * Risks and Benefits explained to the patient  * Procedure site verified and marked as necessary  * Patient was positioned for comfort  * Consent was signed and verified     Time: 4:40 PM       Date of procedure: 2/13/2018    Procedure performed by:  Isaac Hernandez MD    Provider assisted by: None     How tolerated by patient: tolerated the procedure well with no complications    Comments: none    IMPRESSION:     ICD-10-CM ICD-9-CM    1. Primary osteoarthritis of right knee M17.11 715.16 PA DRAIN/INJECT LARGE JOINT/BURSA      EUFLEXXA INJECTION PER DOSE      sodium hyaluronate (SUPARTZ FX/HYALGAN/GENIVSC) 10 mg/mL syrg injection        PLAN: Ms. Shawn Rivera has completed her Euflexxa injection series. she will return as needed.       Scribed by Kita Tran ([address] [facility] [address]) as dictated by Isaac Hernandez MD    I, Dr. Isaac Hernandez, confirm that all documentation is accurate.     Saman Claudio M.D.   [facility]

## 2018-10-03 ENCOUNTER — HOSPITAL ENCOUNTER (OUTPATIENT)
Dept: MAMMOGRAPHY | Age: 48
Discharge: HOME | End: 2018-10-03
Attending: INTERNAL MEDICINE
Payer: OTHER GOVERNMENT

## 2018-10-03 DIAGNOSIS — Z12.31 VISIT FOR SCREENING MAMMOGRAM: ICD-10-CM

## 2018-10-03 PROCEDURE — 77067 SCR MAMMO BI INCL CAD: CPT

## 2020-01-09 ENCOUNTER — HOSPITAL ENCOUNTER (OUTPATIENT)
Dept: ULTRASOUND IMAGING | Age: 50
Discharge: HOME | End: 2020-01-09
Attending: INTERNAL MEDICINE
Payer: OTHER GOVERNMENT

## 2020-01-09 DIAGNOSIS — R10.9 ABDOMINAL PAIN: ICD-10-CM

## 2020-01-09 PROCEDURE — 76705 ECHO EXAM OF ABDOMEN: CPT

## 2020-01-27 ENCOUNTER — HOSPITAL ENCOUNTER (OUTPATIENT)
Dept: NUCLEAR MEDICINE | Age: 50
Discharge: HOME | End: 2020-01-27
Attending: INTERNAL MEDICINE
Payer: OTHER GOVERNMENT

## 2020-01-27 VITALS — WEIGHT: 198 LBS | BODY MASS INDEX: 33.99 KG/M2

## 2020-01-27 DIAGNOSIS — K80.50 GALL BLADDER PAIN: ICD-10-CM

## 2020-01-27 PROCEDURE — 74011250636 HC RX REV CODE- 250/636

## 2020-01-27 PROCEDURE — 78227 HEPATOBIL SYST IMAGE W/DRUG: CPT

## 2020-01-27 PROCEDURE — 74011000258 HC RX REV CODE- 258

## 2020-01-27 RX ORDER — SODIUM CHLORIDE 9 MG/ML
50 INJECTION, SOLUTION INTRAVENOUS
Status: COMPLETED | OUTPATIENT
Start: 2020-01-27 | End: 2020-01-27

## 2020-01-27 RX ADMIN — SODIUM CHLORIDE 50 ML/HR: 900 INJECTION, SOLUTION INTRAVENOUS at 10:33

## 2020-01-27 RX ADMIN — SINCALIDE 1.8 MCG: 5 INJECTION, POWDER, LYOPHILIZED, FOR SOLUTION INTRAVENOUS at 10:34

## 2020-01-29 ENCOUNTER — HOSPITAL ENCOUNTER (OUTPATIENT)
Dept: MAMMOGRAPHY | Age: 50
Discharge: HOME | End: 2020-01-29
Attending: INTERNAL MEDICINE
Payer: OTHER GOVERNMENT

## 2020-01-29 DIAGNOSIS — Z12.31 ENCOUNTER FOR SCREENING MAMMOGRAM FOR BREAST CANCER: ICD-10-CM

## 2020-01-29 PROCEDURE — 77067 SCR MAMMO BI INCL CAD: CPT

## 2020-07-02 ENCOUNTER — OFFICE VISIT (OUTPATIENT)
Dept: SURGERY | Age: 50
End: 2020-07-02

## 2020-07-02 VITALS
DIASTOLIC BLOOD PRESSURE: 88 MMHG | RESPIRATION RATE: 18 BRPM | BODY MASS INDEX: 35 KG/M2 | HEART RATE: 84 BPM | OXYGEN SATURATION: 99 % | HEIGHT: 64 IN | SYSTOLIC BLOOD PRESSURE: 136 MMHG | TEMPERATURE: 97.2 F | WEIGHT: 205 LBS

## 2020-07-02 DIAGNOSIS — E66.01 SEVERE OBESITY (HCC): ICD-10-CM

## 2020-07-02 DIAGNOSIS — K64.2 GRADE III HEMORRHOIDS: Primary | ICD-10-CM

## 2020-07-02 DIAGNOSIS — K62.5 RECTAL BLEEDING: ICD-10-CM

## 2020-07-02 RX ORDER — CHOLECALCIFEROL TAB 125 MCG (5000 UNIT) 125 MCG
5000 TAB ORAL DAILY
COMMUNITY

## 2020-07-02 RX ORDER — INSULIN GLARGINE 100 [IU]/ML
20 INJECTION, SOLUTION SUBCUTANEOUS
COMMUNITY
Start: 2020-06-09

## 2020-07-02 RX ORDER — FLUTICASONE PROPIONATE 50 MCG
2 SPRAY, SUSPENSION (ML) NASAL DAILY
COMMUNITY
Start: 2020-03-26

## 2020-07-02 RX ORDER — DEXTROAMPHETAMINE SACCHARATE, AMPHETAMINE ASPARTATE, DEXTROAMPHETAMINE SULFATE AND AMPHETAMINE SULFATE 5; 5; 5; 5 MG/1; MG/1; MG/1; MG/1
20 TABLET ORAL 2 TIMES DAILY
COMMUNITY
Start: 2020-05-20

## 2020-07-02 RX ORDER — CYCLOBENZAPRINE HCL 10 MG
TABLET ORAL
COMMUNITY
Start: 2020-05-15

## 2020-07-02 RX ORDER — INSULIN LISPRO 200 [IU]/ML
4 INJECTION, SOLUTION SUBCUTANEOUS
COMMUNITY
Start: 2020-06-09

## 2020-07-02 RX ORDER — HYDROCORTISONE ACETATE 25 MG/1
SUPPOSITORY RECTAL
COMMUNITY
Start: 2020-06-26 | End: 2020-07-28

## 2020-07-02 RX ORDER — CARISOPRODOL 350 MG/1
TABLET ORAL
COMMUNITY
Start: 2020-05-15

## 2020-07-02 NOTE — PROGRESS NOTES
Chief Complaint   Patient presents with    Advice Only     \"hemorrhoids \" problems for years worse since march intermittent pain and bright red blood with bm at times bulging     HPI: Cesar Stevens is a 47 y.o. female presenting with chief complain of hemorrhoids. They have been uncomfortable for the last several months. There is pain on and off. It can be quite severe with bowel movements. She sees blood on the toilet paper and in the toilet bowl. She denies fevers, chills or drainage. She moves her bowels 1-2 times per day. She has had occasional issues with constipation and is not on a bowel regimen. She denies fecal incontinence. She had a colonoscopy 10 years ago for irritable bowel syndrome. She is scheduled to have a colonoscopy with GI next month. Past Medical History:   Diagnosis Date    Diabetes ([facility] [address]     Hypertension        Past Surgical History:   Procedure Laterality Date    HX ANKLE FRACTURE TX      HX KNEE ARTHROSCOPY         Family History   Problem Relation Age of Onset    Breast Cancer Other 27    Breast Cancer Maternal Aunt 61    Ovarian Cancer Maternal Grandmother 36    Hypertension Mother     Diabetes Mother     Hypertension Father     Diabetes Father        Social History     Socioeconomic History    Marital status:      Spouse name: Not on file    Number of children: Not on file    Years of education: Not on file    Highest education level: Not on file   Tobacco Use    Smoking status: Never Smoker    Smokeless tobacco: Never Used   Substance and Sexual Activity    Alcohol use: Yes     Frequency: Monthly or less     Comment: occasionally    Drug use: No       Review of Systems - Review of Systems   Constitutional: Negative. HENT: Negative. Eyes: Negative. Respiratory: Negative. Cardiovascular: Negative. Gastrointestinal: Positive for blood in stool. Negative for abdominal pain, constipation, diarrhea, heartburn, melena, nausea and vomiting. Genitourinary: Negative. Musculoskeletal: Positive for joint pain. Negative for back pain, falls, myalgias and neck pain. Skin: Negative. Neurological: Negative. Endo/Heme/Allergies: Negative. Psychiatric/Behavioral: Negative. Outpatient Medications Marked as Taking for the 7/2/20 encounter (Office Visit) with Nirav Sheets MD   Medication Sig Dispense Refill    cholecalciferol (VITAMIN D3) (5000 Units/125 mcg) tab tablet Take 5,000 Units by mouth daily.  Lantus Solostar U-100 Insulin 100 unit/mL (3 mL) inpn       HumaLOG KwikPen Insulin 200 unit/mL (3 mL) inpn       Anusol-HC 25 mg supp       fluticasone propionate (FLONASE) 50 mcg/actuation nasal spray       dextroamphetamine-amphetamine (ADDERALL) 20 mg tablet       cyclobenzaprine (FLEXERIL) 10 mg tablet       VENTOLIN HFA 90 mcg/actuation inhaler       amLODIPine (NORVASC) 5 mg tablet       ADVAIR DISKUS 250-50 mcg/dose diskus inhaler       metFORMIN ER (GLUCOPHAGE XR) 500 mg tablet       metoprolol succinate (TOPROL-XL) 200 mg XL tablet       ARMOUR THYROID 15 mg tablet 45 mg. Allergies   Allergen Reactions    Ace Inhibitors Angioedema    Doxycycline Hives       Vitals:    07/02/20 0909   BP: 136/88   Pulse: 84   Resp: 18   Temp: 97.2 °F (36.2 °C)   SpO2: 99%   Weight: 93 kg (205 lb)   Height: 5' 4\" (1.626 m)   PainSc:   3   PainLoc: Rectum       Physical Exam  Constitutional:       Appearance: She is well-developed. HENT:      Head: Normocephalic and atraumatic. Eyes:      Conjunctiva/sclera: Conjunctivae normal.   Abdominal:      General: There is no distension. Palpations: Abdomen is soft. Tenderness: There is no abdominal tenderness. Musculoskeletal: Normal range of motion. Lymphadenopathy:      Cervical: No cervical adenopathy. Skin:     General: Skin is warm and dry. Findings: No rash. Neurological:      Sensory: No sensory deficit.    Psychiatric:         Speech: Speech normal.     Rectum: Significantly prolapsed external hemorrhoid in anterior region, smaller one in the posterior region  Digital rectal exam: Moderate tone, no mass  Anoscopy: Moderately enlarged internal hemorrhoids, no fissures    Assessment / Plan    Grade 3 hemorrhoids  High-fiber diet with fiber supplement  Proceed with GI for colonoscopy to evaluate the rectal bleeding  Follow-up here if symptoms persist, she will require surgical hemorrhoidectomy    The diagnoses and plan were discussed with the patient. All questions answered. Plan of care agreed to by all concerned.

## 2020-07-02 NOTE — LETTER
7/2/20 Patient: Thom Resendez YOB: 1970 Date of Visit: 7/2/2020 Robert Limon MD 
[address] [place] [address] [id number] [address] VIA In Basket Neptali Drew, [address] [id number] VIA Facsimile: [phone] Dear Omer Cr saw Emma April in the office today for her hemorrhoids. She has significant discomfort and bleeding. She is scheduled to have a colonoscopy with you next month. I have advised a high-fiber diet with a fiber supplement and proceeding with a colonoscopy. If she continues to have discomfort I have asked to return to the office to discuss hemorrhoidectomy. If you have questions, please do not hesitate to call me. I look forward to following your patient along with you. Sincerely, Monica Vazquez MD

## 2020-07-02 NOTE — PATIENT INSTRUCTIONS
High fiber diet start one adult dose fiber powder metamucil or citrucel they have sugar free in large glass water sitz baths for comfort

## 2020-07-07 ENCOUNTER — OFFICE VISIT (OUTPATIENT)
Dept: SURGERY | Age: 50
End: 2020-07-07

## 2020-07-24 ENCOUNTER — OFFICE VISIT (OUTPATIENT)
Dept: SURGERY | Age: 50
End: 2020-07-24

## 2020-07-24 VITALS
HEIGHT: 64 IN | TEMPERATURE: 97.4 F | BODY MASS INDEX: 34.15 KG/M2 | WEIGHT: 200 LBS | HEART RATE: 88 BPM | SYSTOLIC BLOOD PRESSURE: 142 MMHG | OXYGEN SATURATION: 99 % | DIASTOLIC BLOOD PRESSURE: 92 MMHG | RESPIRATION RATE: 18 BRPM

## 2020-07-24 DIAGNOSIS — K42.9 UMBILICAL HERNIA WITHOUT OBSTRUCTION AND WITHOUT GANGRENE: Primary | ICD-10-CM

## 2020-07-24 DIAGNOSIS — Z01.818 PREOPERATIVE TESTING: ICD-10-CM

## 2020-07-24 DIAGNOSIS — K82.8 BILIARY DYSKINESIA: ICD-10-CM

## 2020-07-24 RX ORDER — SODIUM CHLORIDE 0.9 % (FLUSH) 0.9 %
5-40 SYRINGE (ML) INJECTION AS NEEDED
Status: CANCELLED | OUTPATIENT
Start: 2020-07-24

## 2020-07-24 RX ORDER — SODIUM CHLORIDE 0.9 % (FLUSH) 0.9 %
5-40 SYRINGE (ML) INJECTION EVERY 8 HOURS
Status: CANCELLED | OUTPATIENT
Start: 2020-07-24

## 2020-07-24 RX ORDER — INDOCYANINE GREEN AND WATER 25 MG
3 KIT INJECTION
Status: CANCELLED | OUTPATIENT
Start: 2020-07-24 | End: 2020-07-24

## 2020-07-24 NOTE — H&P (VIEW-ONLY)
[facility] General Surgery Subjective: HPI: Patient is a very pleasant 48-year-old female with a past medical history remarkable for hypertension, diabetes, thyroid disease and severe obesity with BMI 34.33 kg/m². She is referred by Dr. Reji Rodriguez for evaluation and management of right upper quadrant pain. The patient recently underwent hepatobiliary scan with intervention which produced pain with 94% ejection fraction. .  The patient states that she has been experiencing this pain since December. Patient Active Problem List  
 Diagnosis Date Noted  Severe obesity ([address] 07/02/2020 Past Medical History:  
Diagnosis Date  Diabetes ([facility] [address]  Hypertension  Pancreatitis  Thyroid disease Past Surgical History:  
Procedure Laterality Date  HX ANKLE FRACTURE TX    
 HX GYN    
 BTL hysterectomy  HX KNEE ARTHROSCOPY    
 right and left Family History Problem Relation Age of Onset  Breast Cancer Other 30  Breast Cancer Maternal Aunt 61  
 Ovarian Cancer Maternal Grandmother 36  Hypertension Mother  Diabetes Mother  Hypertension Father  Diabetes Father Social History Tobacco Use  Smoking status: Never Smoker  Smokeless tobacco: Never Used Substance Use Topics  Alcohol use: Yes Frequency: Monthly or less Comment: occasionally Allergies Allergen Reactions  Ace Inhibitors Angioedema  Doxycycline Hives Prior to Admission medications Medication Sig Start Date End Date Taking? Authorizing Provider  
carisoprodoL (SOMA) 350 mg tablet  5/15/20  Yes Provider, Historical  
cholecalciferol (VITAMIN D3) (5000 Units/125 mcg) tab tablet Take 5,000 Units by mouth daily.    Yes Provider, Historical  
Lantus Solostar U-100 Insulin 100 unit/mL (3 mL) in  6/9/20  Yes Provider, Historical  
HumaLOG KwikPen Insulin 200 unit/mL (3 mL) in  6/9/20  Yes Provider, Historical  
 Anusol-HC 25 mg supp  6/26/20  Yes Provider, Historical  
fluticasone propionate (FLONASE) 50 mcg/actuation nasal spray  3/26/20  Yes Provider, Historical  
dextroamphetamine-amphetamine (ADDERALL) 20 mg tablet  5/20/20  Yes Provider, Historical  
cyclobenzaprine (FLEXERIL) 10 mg tablet  5/15/20  Yes Provider, Historical  
calcium-vitamin D (OYSTER SHELL) 500 mg(1,250mg) -200 unit per tablet Take 1 Tab by mouth two (2) times daily (with meals). Yes Provider, Historical  
VENTOLIN HFA 90 mcg/actuation inhaler  8/30/17  Yes Provider, Historical  
amLODIPine (NORVASC) 5 mg tablet  11/30/17  Yes Provider, Historical  
ADVAIR DISKUS 250-50 mcg/dose diskus inhaler  8/30/17  Yes Provider, Historical  
VICTOZA 3-HEAVEN 0.6 mg/0.1 mL (18 mg/3 mL) pnij  11/15/17  Yes Provider, Historical  
metFORMIN ER (GLUCOPHAGE XR) 500 mg tablet  8/30/17  Yes Provider, Historical  
metoprolol succinate (TOPROL-XL) 200 mg XL tablet  10/18/17  Yes Provider, Historical  
ARMOUR THYROID 15 mg tablet 45 mg. 11/15/17  Yes Provider, Historical  
meloxicam (MOBIC) 15 mg tablet Take 1 Tab by mouth daily (with breakfast). 12/5/17  Yes Frank Jorgensen MD  
methylPREDNISolone (MEDROL DOSEPACK) 4 mg tablet Per dose pack instructions 1/25/18   Frank Jorgensen MD  
celecoxib (CELEBREX) 200 mg capsule  11/7/17   Provider, Historical  
 
 
Review of Systems:   
14 systems were reviewed. The results are as above in the HPI and otherwise negative. Objective:  
 
Vitals:  
 07/24/20 1501 BP: (!) 142/92 Pulse: 88 Resp: 18 Temp: 97.4 °F (36.3 °C) SpO2: 99% Weight: 90.7 kg (200 lb) Height: 5' 4\" (1.626 m) Physical Exam: 
GENERAL: alert, cooperative, no distress, appears stated age, EYE: conjunctivae/corneas clear. PERRL, EOM's intact.  
THROAT & NECK: normal and no erythema or exudates noted. ,   
LYMPHATIC: Cervical, supraclavicular, and axillary nodes normal. ,  
LUNG: clear to auscultation bilaterally,  
 HEART: regular rate and rhythm, S1, S2 normal, no murmur, click, rub or gallop, ABDOMEN: soft, non-tender. Bowel sounds normal. No masses,  no organomegaly, umbilical hernia. EXTREMITIES:  extremities normal, atraumatic, no cyanosis or edema, SKIN: Normal., NEUROLOGIC: AOx3. Cranial nerves 2-12 and sensation grossly intact. ,  
 
Data Review:  to be done Ms. Vickie Mcknight has a reminder for a \"due or due soon\" health maintenance. I have asked that she contact her primary care provider for follow-up on this health maintenance. Impression: · Patient with biliary dyskinesia. Plan:  
 
· Robot-assisted laparoscopic cholecystectomy with primary umbilical hernia repair · Consent on chart · Preoperative orders written Signed By: Irene Hankins MD   
 July 24, 2020

## 2020-07-24 NOTE — PATIENT INSTRUCTIONS
Low-Fat Diet for Gallbladder Disease: After Your Visit  Your Care Instructions  When you eat, the gallbladder releases bile, which helps you digest the fat in food. If you have an inflamed gallbladder, this may cause pain. A low-fat diet may give your gallbladder a rest so you can start to heal. Your doctor and dietitian can help you make an eating plan that does not irritate your digestive system. Always talk with your doctor or dietitian before you make changes in your diet. Follow-up care is a key part of your treatment and safety. Be sure to make and go to all appointments, and call your doctor if you are having problems. Its also a good idea to know your test results and keep a list of the medicines you take. How can you care for yourself at home? · Eat many small meals and snacks each day instead of three large meals. · Choose lean meats. ¨ Eat no more than 5 to 6½ ounces of meat a day. ¨ Cut off all fat you can see. ¨ Eat chicken and turkey without the skin. ¨ Many types of fish, such as salmon, lake trout, tuna, and herring, provide healthy omega-3 fat. But, avoid fish canned in oil, such as sardines in olive oil. ¨ Bake, broil, or grill meats, fowl, or fish instead of frying them in butter or fat. · Drink or eat nonfat or low-fat milk, yogurt, cheese, or other milk products each day. ¨ Read the labels on cheeses, and choose those with less than 5 grams of fat an ounce. ¨ Try fat-free sour cream, cream cheese, or yogurt. ¨ Avoid cream soups and cream sauces on pasta. ¨ Eat low-fat ice cream, frozen yogurt, or sorbet. Avoid regular ice cream.  · Eat whole-grain cereals, breads, crackers, rice, or pasta. Avoid high-fat foods such as croissants, scones, biscuits, waffles, doughnuts, muffins, granola, and high-fat breads. · Flavor your foods with herbs and spices (such as basil, tarragon, or mint), fat-free sauces, or lemon juice instead of butter.  You can also use butter substitutes, fat-free mayonnaise, or fat-free dressing. · Try applesauce, prune puree, or mashed bananas to replace some or all of the fat when you bake. · Limit fats and oils, such as butter, margarine, mayonnaise, and salad dressing, to no more than 1 tablespoon a meal.  · Avoid high-fat foods, such as:  ¨ Chocolate, whole milk, ice cream, processed cheese, and egg yolks. ¨ Fried or buttered foods. ¨ Ham, salami, and sorenson. ¨ Cinnamon rolls, cakes, pies, cookies, and other pastries. ¨ Prepared snack foods, such as potato chips, nut and granola bars, and mixed nuts. ¨ Coconut and avocado. · Learn how to read food labels for serving sizes and ingredients. Fast-food and convenience-food meals often have lots of fat. Where can you learn more? Go to [employer]  Enter ALFA in the search box to learn more about \"Low-Fat Diet for Gallbladder Disease: After Your Visit. \"   © 7841-2293 Healthwise, Incorporated. Care instructions adapted under license by [employer] (which disclaims liability or warranty for this information). This care instruction is for use with your licensed healthcare professional. If you have questions about a medical condition or this instruction, always ask your healthcare professional. [place] [address] any warranty or liability for your use of this information. Content Version: 6.4.716377; Last Revised: August 31, 2011       High-Fiber Diet: Care Instructions  Your Care Instructions     A high-fiber diet may help you relieve constipation and feel less bloated. Your doctor and dietitian will help you make a high-fiber eating plan based on your personal needs. The plan will include the things you like to eat. It will also make sure that you get 30 grams of fiber a day. Before you make changes to the way you eat, be sure to talk with your doctor or dietitian. Follow-up care is a key part of your treatment and safety.  Be sure to make and go to all appointments, and call your doctor if you are having problems. It's also a good idea to know your test results and keep a list of the medicines you take. How can you care for yourself at home? · You can increase how much fiber you get if you eat more of certain foods. These foods include:  ? Whole-grain breads and cereals. ? Fruits, such as pears, apples, and peaches. Eat the skins, peels, and seeds, if you can.  ? Vegetables, such as broccoli, cabbage, spinach, carrots, asparagus, and squash. ? Starchy vegetables. These include potatoes with skins, kidney beans, and lima beans. · Take a fiber supplement every day if your doctor recommends it. Examples are Benefiber, Citrucel, FiberCon, and Metamucil. Ask your doctor how much to take. · Drink plenty of fluids, enough so that your urine is light yellow or clear like water. If you have kidney, heart, or liver disease and have to limit fluids, talk with your doctor before you increase the amount of fluids you drink. · Get some exercise every day. Exercise helps stool move through the colon. It also helps prevent constipation. · Keep a food diary. Try to notice and write down what foods cause gas, pain, or other symptoms. Then you can avoid these foods. Where can you learn more? Go to http://trudi-peng.info/  Enter V102 in the search box to learn more about \"High-Fiber Diet: Care Instructions. \"  Current as of: August 22, 2019               Content Version: 12.5  © 5916-6443 Healthwise, Incorporated. Care instructions adapted under license by [employer] (which disclaims liability or warranty for this information). If you have questions about a medical condition or this instruction, always ask your healthcare professional. [address] any warranty or liability for your use of this information. Cholecystectomy: Before Your Surgery  What is cholecystectomy?      Cholecystectomy (xy-uxy-uci-RENNY-tuh-karrie) is a type of surgery. It removes a diseased gallbladder. This surgery is usually done as a laparoscopic surgery. The doctor puts a lighted tube and other surgical tools through small cuts (incisions) in your belly. The tube is called a scope. It lets your doctor see your organs so he or she can do the surgery. The incisions leave scars that fade with time. Most people go home the same day. You probably will feel better each day. Most people have only a small amount of pain after 1 week. If you have a desk job, you can probably go back to work in 1 to 2 weeks. If you lift heavy objects or have a very active job, it may take up to 4 weeks. In some cases, open surgery is the best choice. Your doctor may choose open surgery in advance. Or he or she may choose it in the middle of laparoscopic surgery. In open surgery, the doctor makes a larger incision in your upper belly. If you have open surgery, you will probably stay in the hospital for 2 to 4 days. And it may take 4 to 6 weeks to get back to your normal routine. Follow-up care is a key part of your treatment and safety. Be sure to make and go to all appointments, and call your doctor if you are having problems. It's also a good idea to know your test results and keep a list of the medicines you take. How do you prepare for surgery? Surgery can be stressful. This information will help you understand what you can expect. And it will help you safely prepare for surgery. Preparing for surgery  · You may need to empty your colon with an enema or laxative. Your doctor will tell you how to do this. · Be sure you have someone to take you home. Anesthesia and pain medicine will make it unsafe for you to drive or get home on your own. · Understand exactly what surgery is planned, along with the risks, benefits, and other options. · If you take aspirin or some other blood thinner, ask your doctor if you should stop taking it before your surgery.  Make sure that you understand exactly what your doctor wants you to do. These medicines increase the risk of bleeding. · Tell your doctor ALL the medicines, vitamins, supplements, and herbal remedies you take. Some may increase the risk of problems during your surgery. Your doctor will tell you if you should stop taking any of them before the surgery and how soon to do it. · Make sure your doctor and the hospital have a copy of your advance directive. If you don't have one, you may want to prepare one. It lets others know your health care wishes. It's a good thing to have before any type of surgery or procedure. What happens on the day of surgery? · Follow the instructions exactly about when to stop eating and drinking. If you don't, your surgery may be canceled. If your doctor told you to take your medicines on the day of surgery, take them with only a sip of water. · Take a bath or shower before you come in for your surgery. Do not apply lotions, perfumes, deodorants, or nail polish. · Do not shave the surgical site yourself. · Take off all jewelry and piercings. And take out contact lenses, if you wear them. At the hospital or surgery center    · Bring a picture ID. · The area for surgery is often marked to make sure there are no errors. · You will be kept comfortable and safe by your anesthesia provider. You will be asleep during the surgery. · The surgery usually takes 1 to 2 hours. When should you call your doctor? · You have questions or concerns. · You don't understand how to prepare for your surgery. · You become ill before the surgery (such as fever, flu, or a cold). · You need to reschedule or have changed your mind about having the surgery. Where can you learn more? Go to http://www.gray.com/  Enter U218 in the search box to learn more about \"Cholecystectomy: Before Your Surgery. \"  Current as of: August 12, 2019               Content Version: 12.5  © 4851-8879 Healthwise, Incorporated. Care instructions adapted under license by [employer] (which disclaims liability or warranty for this information). If you have questions about a medical condition or this instruction, always ask your healthcare professional. [address] any warranty or liability for your use of this information. Abdominal Hernia Repair: Before Your Surgery  What is abdominal hernia repair surgery? An abdominal hernia repair is a type of surgery. It fixes a problem called a hernia. A hernia is a bulge under the skin in your belly. It happens when you have a weak spot in your belly muscles and a piece of your intestines or tissues pokes through your muscles. This can cause pain. You may notice the pain most when you lift something heavy. You can have a hernia near your belly button. Or it may be in a scar from an earlier surgery. To fix it, the doctor will do one of two kinds of surgery. In open surgery, the doctor makes one cut near the hernia. This cut is called an incision. In laparoscopic surgery, the doctor makes several very small incisions and uses a thin, lighted scope and small tools. In either type of surgery, the doctor pushes the bulge back in place, if needed. Then the doctor sews the healthy tissue back together. Often the doctor patches the weak spot with a piece of material.  Laparoscopic surgery leaves several small scars. Open surgery leaves one long scar. The scars fade with time. You will probably need to take 1 to 2 weeks off from work. But if your job requires heavy lifting or other physical work, you may need to take 4 to 6 weeks off. Follow-up care is a key part of your treatment and safety. Be sure to make and go to all appointments, and call your doctor if you are having problems. It's also a good idea to know your test results and keep a list of the medicines you take. How do you prepare for the surgery? Surgery can be stressful.  This information will help you understand what you can expect. And it will help you safely prepare for surgery. Preparing for surgery  · Be sure you have someone to take you home. Anesthesia and pain medicine will make it unsafe for you to drive or get home on your own. · Understand exactly what surgery is planned, along with the risks, benefits, and other options. · Tell your doctor ALL the medicines, vitamins, supplements, and herbal remedies you take. Some may increase the risk of problems during your surgery. Your doctor will tell you if you should stop taking any of them before the surgery and how soon to do it. · If you take aspirin or some other blood thinner, ask your doctor if you should stop taking it before your surgery. Make sure that you understand exactly what your doctor wants you to do. These medicines increase the risk of bleeding. · Make sure your doctor and the hospital have a copy of your advance directive. If you don't have one, you may want to prepare one. It lets others know your health care wishes. It's a good thing to have before any type of surgery or procedure. .  What happens on the day of surgery? · Follow the instructions exactly about when to stop eating and drinking. If you don't, your surgery may be canceled. If your doctor told you to take your medicines on the day of surgery, take them with only a sip of water. · Take a bath or shower before you come in for your surgery. Do not apply lotions, perfumes, deodorants, or nail polish. · Do not shave the surgical site yourself. · Take off all jewelry and piercings. And take out contact lenses, if you wear them. At the hospital or surgery center    · Bring a picture ID. · The area for surgery is often marked to make sure there are no errors. · You will be kept comfortable and safe by your anesthesia provider. You will be asleep during the surgery. · The surgery will take 30 minutes to 2 hours.  It depends on how large the hernia is and where it is.  When should you call your doctor? · You have questions or concerns. · You don't understand how to prepare for your surgery. · You become ill before the surgery (such as fever, flu, or a cold). · You need to reschedule or have changed your mind about having the surgery. Where can you learn more? Go to http://trudi-peng.info/  Enter U288 in the search box to learn more about \"Abdominal Hernia Repair: Before Your Surgery. \"  Current as of: August 12, 2019               Content Version: 12.5  © 2470-3471 Healthwise, Incorporated. Care instructions adapted under license by [employer] (which disclaims liability or warranty for this information). If you have questions about a medical condition or this instruction, always ask your healthcare professional. [address] any warranty or liability for your use of this information.

## 2020-07-24 NOTE — PROGRESS NOTES
[employer] [facility]  General Surgery    Subjective:      HPI: Patient is a very pleasant 69-year-old female with a past medical history remarkable for hypertension, diabetes, thyroid disease and severe obesity with BMI 34.33 kg/m². She is referred by Dr. Ronal Reed for evaluation and management of right upper quadrant pain. The patient recently underwent hepatobiliary scan with intervention which produced pain with 94% ejection fraction. .  The patient states that she has been experiencing this pain since December. Patient Active Problem List    Diagnosis Date Noted    Severe obesity ([facility] [address] 07/02/2020     Past Medical History:   Diagnosis Date    Diabetes ([facility] [address]     Hypertension     Pancreatitis     Thyroid disease       Past Surgical History:   Procedure Laterality Date    HX ANKLE FRACTURE TX      HX GYN      BTL hysterectomy    HX KNEE ARTHROSCOPY      right and left      Family History   Problem Relation Age of Onset    Breast Cancer Other 27    Breast Cancer Maternal Aunt 61    Ovarian Cancer Maternal Grandmother 36    Hypertension Mother     Diabetes Mother     Hypertension Father     Diabetes Father       Social History     Tobacco Use    Smoking status: Never Smoker    Smokeless tobacco: Never Used   Substance Use Topics    Alcohol use: Yes     Frequency: Monthly or less     Comment: occasionally      Allergies   Allergen Reactions    Ace Inhibitors Angioedema    Doxycycline Hives       Prior to Admission medications    Medication Sig Start Date End Date Taking? Authorizing Provider   carisoprodoL (SOMA) 350 mg tablet  5/15/20  Yes Provider, Historical   cholecalciferol (VITAMIN D3) (5000 Units/125 mcg) tab tablet Take 5,000 Units by mouth daily.    Yes Provider, Historical   Lantus Solostar U-100 Insulin 100 unit/mL (3 mL) inpn  6/9/20  Yes Provider, Historical   HumaLOG KwikPen Insulin 200 unit/mL (3 mL) inpn  6/9/20  Yes Provider, Historical   Anusol-HC 25 mg supp  6/26/20  Yes Provider, Historical   fluticasone propionate (FLONASE) 50 mcg/actuation nasal spray  3/26/20  Yes Provider, Historical   dextroamphetamine-amphetamine (ADDERALL) 20 mg tablet  5/20/20  Yes Provider, Historical   cyclobenzaprine (FLEXERIL) 10 mg tablet  5/15/20  Yes Provider, Historical   calcium-vitamin D (OYSTER SHELL) 500 mg(1,250mg) -200 unit per tablet Take 1 Tab by mouth two (2) times daily (with meals). Yes Provider, Historical   VENTOLIN HFA 90 mcg/actuation inhaler  8/30/17  Yes Provider, Historical   amLODIPine (NORVASC) 5 mg tablet  11/30/17  Yes Provider, Historical   ADVAIR DISKUS 250-50 mcg/dose diskus inhaler  8/30/17  Yes Provider, Historical   VICTOZA 3-HEAVEN 0.6 mg/0.1 mL (18 mg/3 mL) pnij  11/15/17  Yes Provider, Historical   metFORMIN ER (GLUCOPHAGE XR) 500 mg tablet  8/30/17  Yes Provider, Historical   metoprolol succinate (TOPROL-XL) 200 mg XL tablet  10/18/17  Yes Provider, Historical   ARMOUR THYROID 15 mg tablet 45 mg. 11/15/17  Yes Provider, Historical   meloxicam (MOBIC) 15 mg tablet Take 1 Tab by mouth daily (with breakfast). 12/5/17  Yes Frankie Sommer MD   methylPREDNISolone (MEDROL DOSEPACK) 4 mg tablet Per dose pack instructions 1/25/18   Frankie Sommer MD   celecoxib (CELEBREX) 200 mg capsule  11/7/17   Provider, Historical       Review of Systems:    14 systems were reviewed. The results are as above in the HPI and otherwise negative. Objective:     Vitals:    07/24/20 1501   BP: (!) 142/92   Pulse: 88   Resp: 18   Temp: 97.4 °F (36.3 °C)   SpO2: 99%   Weight: 90.7 kg (200 lb)   Height: 5' 4\" (1.626 m)       Physical Exam:  GENERAL: alert, cooperative, no distress, appears stated age,   EYE: conjunctivae/corneas clear. PERRL, EOM's intact.   THROAT & NECK: normal and no erythema or exudates noted. ,    LYMPHATIC: Cervical, supraclavicular, and axillary nodes normal. ,   LUNG: clear to auscultation bilaterally,   HEART: regular rate and rhythm, S1, S2 normal, no murmur, click, rub or gallop,   ABDOMEN: soft, non-tender. Bowel sounds normal. No masses,  no organomegaly, umbilical hernia. EXTREMITIES:  extremities normal, atraumatic, no cyanosis or edema,   SKIN: Normal.,   NEUROLOGIC: AOx3. Cranial nerves 2-12 and sensation grossly intact. ,     Data Review:  to be done    Ms. Melyssa Booker has a reminder for a \"due or due soon\" health maintenance. I have asked that she contact her primary care provider for follow-up on this health maintenance. Impression:     · Patient with biliary dyskinesia.     Plan:     · Robot-assisted laparoscopic cholecystectomy with primary umbilical hernia repair  · Consent on chart  · Preoperative orders written    Signed By: Linda Middleton MD     July 24, 2020

## 2020-07-27 ENCOUNTER — HOSPITAL ENCOUNTER (OUTPATIENT)
Dept: PREADMISSION TESTING | Age: 50
Discharge: HOME | End: 2020-07-27
Payer: COMMERCIAL

## 2020-07-27 ENCOUNTER — HOSPITAL ENCOUNTER (OUTPATIENT)
Dept: GENERAL RADIOLOGY | Age: 50
Discharge: HOME | End: 2020-07-27
Payer: COMMERCIAL

## 2020-07-27 DIAGNOSIS — Z01.818 PREOPERATIVE TESTING: ICD-10-CM

## 2020-07-27 DIAGNOSIS — K42.9 UMBILICAL HERNIA WITHOUT OBSTRUCTION AND WITHOUT GANGRENE: ICD-10-CM

## 2020-07-27 DIAGNOSIS — K82.8 BILIARY DYSKINESIA: ICD-10-CM

## 2020-07-27 LAB
ANION GAP SERPL CALC-SCNC: 5 MMOL/L (ref 3–18)
ATRIAL RATE: 72 BPM
BASOPHILS # BLD: 0 K/UL (ref 0–0.1)
BASOPHILS NFR BLD: 0 % (ref 0–2)
BUN SERPL-MCNC: 9 MG/DL (ref 7–18)
BUN/CREAT SERPL: 11 (ref 12–20)
CALCIUM SERPL-MCNC: 8.8 MG/DL (ref 8.5–10.1)
CALCULATED P AXIS, ECG09: 42 DEGREES
CALCULATED R AXIS, ECG10: 40 DEGREES
CALCULATED T AXIS, ECG11: -26 DEGREES
CHLORIDE SERPL-SCNC: 106 MMOL/L (ref 100–111)
CO2 SERPL-SCNC: 28 MMOL/L (ref 21–32)
CREAT SERPL-MCNC: 0.84 MG/DL (ref 0.6–1.3)
DIAGNOSIS, 93000: NORMAL
DIFFERENTIAL METHOD BLD: ABNORMAL
EOSINOPHIL # BLD: 0.4 K/UL (ref 0–0.4)
EOSINOPHIL NFR BLD: 5 % (ref 0–5)
ERYTHROCYTE [DISTWIDTH] IN BLOOD BY AUTOMATED COUNT: 13.3 % (ref 11.6–14.5)
GLUCOSE SERPL-MCNC: 167 MG/DL (ref 74–99)
HCT VFR BLD AUTO: 35.9 % (ref 35–45)
HGB BLD-MCNC: 12.1 G/DL (ref 12–16)
LYMPHOCYTES # BLD: 3.6 K/UL (ref 0.9–3.6)
LYMPHOCYTES NFR BLD: 47 % (ref 21–52)
MCH RBC QN AUTO: 29.5 PG (ref 24–34)
MCHC RBC AUTO-ENTMCNC: 33.7 G/DL (ref 31–37)
MCV RBC AUTO: 87.6 FL (ref 74–97)
MONOCYTES # BLD: 0.4 K/UL (ref 0.05–1.2)
MONOCYTES NFR BLD: 6 % (ref 3–10)
NEUTS SEG # BLD: 3.2 K/UL (ref 1.8–8)
NEUTS SEG NFR BLD: 42 % (ref 40–73)
P-R INTERVAL, ECG05: 132 MS
PLATELET # BLD AUTO: 287 K/UL (ref 135–420)
PMV BLD AUTO: 11.9 FL (ref 9.2–11.8)
POTASSIUM SERPL-SCNC: 3.9 MMOL/L (ref 3.5–5.5)
Q-T INTERVAL, ECG07: 412 MS
QRS DURATION, ECG06: 80 MS
QTC CALCULATION (BEZET), ECG08: 451 MS
RBC # BLD AUTO: 4.1 M/UL (ref 4.2–5.3)
SODIUM SERPL-SCNC: 139 MMOL/L (ref 136–145)
VENTRICULAR RATE, ECG03: 72 BPM
WBC # BLD AUTO: 7.7 K/UL (ref 4.6–13.2)

## 2020-07-27 PROCEDURE — 87635 SARS-COV-2 COVID-19 AMP PRB: CPT

## 2020-07-27 PROCEDURE — 80048 BASIC METABOLIC PNL TOTAL CA: CPT

## 2020-07-27 PROCEDURE — 71046 X-RAY EXAM CHEST 2 VIEWS: CPT

## 2020-07-27 PROCEDURE — 85025 COMPLETE CBC W/AUTO DIFF WBC: CPT

## 2020-07-27 PROCEDURE — 36415 COLL VENOUS BLD VENIPUNCTURE: CPT

## 2020-07-27 PROCEDURE — 93005 ELECTROCARDIOGRAM TRACING: CPT

## 2020-07-28 LAB — SARS-COV-2, COV2NT: NOT DETECTED

## 2020-07-29 ENCOUNTER — ANESTHESIA EVENT (OUTPATIENT)
Dept: SURGERY | Age: 50
End: 2020-07-29
Payer: COMMERCIAL

## 2020-07-30 ENCOUNTER — HOSPITAL ENCOUNTER (OUTPATIENT)
Age: 50
Discharge: HOME | End: 2020-07-30
Attending: SURGERY | Admitting: SURGERY
Payer: COMMERCIAL

## 2020-07-30 ENCOUNTER — ANESTHESIA (OUTPATIENT)
Dept: SURGERY | Age: 50
End: 2020-07-30
Payer: COMMERCIAL

## 2020-07-30 VITALS
TEMPERATURE: 96.9 F | SYSTOLIC BLOOD PRESSURE: 136 MMHG | RESPIRATION RATE: 16 BRPM | DIASTOLIC BLOOD PRESSURE: 85 MMHG | BODY MASS INDEX: 34.04 KG/M2 | WEIGHT: 199.38 LBS | OXYGEN SATURATION: 93 % | HEIGHT: 64 IN | HEART RATE: 81 BPM

## 2020-07-30 DIAGNOSIS — K82.8 BILIARY DYSKINESIA: Primary | ICD-10-CM

## 2020-07-30 DIAGNOSIS — K42.9 UMBILICAL HERNIA WITHOUT OBSTRUCTION AND WITHOUT GANGRENE: ICD-10-CM

## 2020-07-30 LAB
GLUCOSE BLD STRIP.AUTO-MCNC: 136 MG/DL (ref 70–110)
GLUCOSE BLD STRIP.AUTO-MCNC: 188 MG/DL (ref 70–110)

## 2020-07-30 PROCEDURE — 77030002933 HC SUT MCRYL J&J -A: Performed by: SURGERY

## 2020-07-30 PROCEDURE — 74011000250 HC RX REV CODE- 250: Performed by: SURGERY

## 2020-07-30 PROCEDURE — 76060000034 HC ANESTHESIA 1.5 TO 2 HR: Performed by: SURGERY

## 2020-07-30 PROCEDURE — 74011250636 HC RX REV CODE- 250/636: Performed by: NURSE ANESTHETIST, CERTIFIED REGISTERED

## 2020-07-30 PROCEDURE — 74011250636 HC RX REV CODE- 250/636

## 2020-07-30 PROCEDURE — 77030031139 HC SUT VCRL2 J&J -A: Performed by: SURGERY

## 2020-07-30 PROCEDURE — 76010000875 HC OR TIME 1.5 TO 2HR INTENSV - TIER 2: Performed by: SURGERY

## 2020-07-30 PROCEDURE — 74011250637 HC RX REV CODE- 250/637: Performed by: ANESTHESIOLOGY

## 2020-07-30 PROCEDURE — 77030040361 HC SLV COMPR DVT MDII -B: Performed by: SURGERY

## 2020-07-30 PROCEDURE — 77030010031 HC SCIS ENDOSC MPLR J&J -C: Performed by: SURGERY

## 2020-07-30 PROCEDURE — 77030012770 HC TRCR OPT FX AMR -B: Performed by: SURGERY

## 2020-07-30 PROCEDURE — 77030020703 HC SEAL CANN DISP INTU -B: Performed by: SURGERY

## 2020-07-30 PROCEDURE — 82962 GLUCOSE BLOOD TEST: CPT

## 2020-07-30 PROCEDURE — 76210000006 HC OR PH I REC 0.5 TO 1 HR: Performed by: SURGERY

## 2020-07-30 PROCEDURE — 74011000254 HC RX REV CODE- 254: Performed by: SURGERY

## 2020-07-30 PROCEDURE — 77030008608 HC TRCR ENDOSC SMTH AMR -B: Performed by: SURGERY

## 2020-07-30 PROCEDURE — 88304 TISSUE EXAM BY PATHOLOGIST: CPT

## 2020-07-30 PROCEDURE — 77030040922 HC BLNKT HYPOTHRM STRY -A: Performed by: SURGERY

## 2020-07-30 PROCEDURE — 74011000250 HC RX REV CODE- 250: Performed by: NURSE ANESTHETIST, CERTIFIED REGISTERED

## 2020-07-30 PROCEDURE — 76210000026 HC REC RM PH II 1 TO 1.5 HR: Performed by: SURGERY

## 2020-07-30 PROCEDURE — 74011250637 HC RX REV CODE- 250/637: Performed by: NURSE ANESTHETIST, CERTIFIED REGISTERED

## 2020-07-30 PROCEDURE — 74011636637 HC RX REV CODE- 636/637: Performed by: NURSE ANESTHETIST, CERTIFIED REGISTERED

## 2020-07-30 PROCEDURE — 77030019605: Performed by: SURGERY

## 2020-07-30 PROCEDURE — 77030018390 HC SPNG HEMSTAT2 J&J -B: Performed by: SURGERY

## 2020-07-30 PROCEDURE — 77030035277 HC OBTRTR BLDELSS DISP INTU -B: Performed by: SURGERY

## 2020-07-30 PROCEDURE — 77030003028 HC SUT VCRL J&J -A: Performed by: SURGERY

## 2020-07-30 PROCEDURE — 77030009848 HC PASSR SUT SET COOP -C: Performed by: SURGERY

## 2020-07-30 PROCEDURE — 77030010939 HC CLP LIG TELE -B: Performed by: SURGERY

## 2020-07-30 RX ORDER — ROCURONIUM BROMIDE 10 MG/ML
INJECTION, SOLUTION INTRAVENOUS AS NEEDED
Status: DISCONTINUED | OUTPATIENT
Start: 2020-07-30 | End: 2020-07-30 | Stop reason: HOSPADM

## 2020-07-30 RX ORDER — SUCCINYLCHOLINE CHLORIDE 20 MG/ML
INJECTION INTRAMUSCULAR; INTRAVENOUS AS NEEDED
Status: DISCONTINUED | OUTPATIENT
Start: 2020-07-30 | End: 2020-07-30 | Stop reason: HOSPADM

## 2020-07-30 RX ORDER — NEOSTIGMINE METHYLSULFATE 1 MG/ML
INJECTION, SOLUTION INTRAVENOUS AS NEEDED
Status: DISCONTINUED | OUTPATIENT
Start: 2020-07-30 | End: 2020-07-30 | Stop reason: HOSPADM

## 2020-07-30 RX ORDER — DEXTROSE 50 % IN WATER (D50W) INTRAVENOUS SYRINGE
25-50 AS NEEDED
Status: DISCONTINUED | OUTPATIENT
Start: 2020-07-30 | End: 2020-07-30 | Stop reason: HOSPADM

## 2020-07-30 RX ORDER — SODIUM CHLORIDE 0.9 % (FLUSH) 0.9 %
5-40 SYRINGE (ML) INJECTION AS NEEDED
Status: DISCONTINUED | OUTPATIENT
Start: 2020-07-30 | End: 2020-07-30 | Stop reason: HOSPADM

## 2020-07-30 RX ORDER — INSULIN LISPRO 100 [IU]/ML
INJECTION, SOLUTION INTRAVENOUS; SUBCUTANEOUS ONCE
Status: COMPLETED | OUTPATIENT
Start: 2020-07-30 | End: 2020-07-30

## 2020-07-30 RX ORDER — DIPHENHYDRAMINE HYDROCHLORIDE 50 MG/ML
12.5 INJECTION, SOLUTION INTRAMUSCULAR; INTRAVENOUS
Status: DISCONTINUED | OUTPATIENT
Start: 2020-07-30 | End: 2020-07-30 | Stop reason: HOSPADM

## 2020-07-30 RX ORDER — DOCUSATE SODIUM 100 MG/1
100 CAPSULE, LIQUID FILLED ORAL 2 TIMES DAILY
Qty: 60 CAP | Refills: 2 | Status: SHIPPED | OUTPATIENT
Start: 2020-07-30 | End: 2020-10-28

## 2020-07-30 RX ORDER — LIDOCAINE HYDROCHLORIDE 10 MG/ML
0.1 INJECTION, SOLUTION EPIDURAL; INFILTRATION; INTRACAUDAL; PERINEURAL AS NEEDED
Status: DISCONTINUED | OUTPATIENT
Start: 2020-07-30 | End: 2020-07-30 | Stop reason: HOSPADM

## 2020-07-30 RX ORDER — SCOLOPAMINE TRANSDERMAL SYSTEM 1 MG/1
1 PATCH, EXTENDED RELEASE TRANSDERMAL
Status: DISCONTINUED | OUTPATIENT
Start: 2020-07-30 | End: 2020-07-30 | Stop reason: HOSPADM

## 2020-07-30 RX ORDER — SODIUM CHLORIDE 0.9 % (FLUSH) 0.9 %
5-40 SYRINGE (ML) INJECTION EVERY 8 HOURS
Status: DISCONTINUED | OUTPATIENT
Start: 2020-07-30 | End: 2020-07-30 | Stop reason: HOSPADM

## 2020-07-30 RX ORDER — INSULIN LISPRO 100 [IU]/ML
INJECTION, SOLUTION INTRAVENOUS; SUBCUTANEOUS ONCE
Status: DISCONTINUED | OUTPATIENT
Start: 2020-07-30 | End: 2020-07-30 | Stop reason: HOSPADM

## 2020-07-30 RX ORDER — GLYCOPYRROLATE 0.2 MG/ML
INJECTION INTRAMUSCULAR; INTRAVENOUS AS NEEDED
Status: DISCONTINUED | OUTPATIENT
Start: 2020-07-30 | End: 2020-07-30 | Stop reason: HOSPADM

## 2020-07-30 RX ORDER — OXYCODONE AND ACETAMINOPHEN 5; 325 MG/1; MG/1
1 TABLET ORAL
Qty: 25 TAB | Refills: 0 | Status: SHIPPED | OUTPATIENT
Start: 2020-07-30 | End: 2020-08-06

## 2020-07-30 RX ORDER — KETOROLAC TROMETHAMINE 15 MG/ML
INJECTION, SOLUTION INTRAMUSCULAR; INTRAVENOUS AS NEEDED
Status: DISCONTINUED | OUTPATIENT
Start: 2020-07-30 | End: 2020-07-30 | Stop reason: HOSPADM

## 2020-07-30 RX ORDER — BUPIVACAINE HYDROCHLORIDE AND EPINEPHRINE 5; 5 MG/ML; UG/ML
INJECTION, SOLUTION EPIDURAL; INTRACAUDAL; PERINEURAL AS NEEDED
Status: DISCONTINUED | OUTPATIENT
Start: 2020-07-30 | End: 2020-07-30 | Stop reason: HOSPADM

## 2020-07-30 RX ORDER — INDOCYANINE GREEN AND WATER 25 MG
3 KIT INJECTION
Status: COMPLETED | OUTPATIENT
Start: 2020-07-30 | End: 2020-07-30

## 2020-07-30 RX ORDER — SODIUM CHLORIDE, SODIUM LACTATE, POTASSIUM CHLORIDE, CALCIUM CHLORIDE 600; 310; 30; 20 MG/100ML; MG/100ML; MG/100ML; MG/100ML
100 INJECTION, SOLUTION INTRAVENOUS CONTINUOUS
Status: DISCONTINUED | OUTPATIENT
Start: 2020-07-30 | End: 2020-07-30 | Stop reason: HOSPADM

## 2020-07-30 RX ORDER — LIDOCAINE HYDROCHLORIDE 20 MG/ML
INJECTION, SOLUTION EPIDURAL; INFILTRATION; INTRACAUDAL; PERINEURAL AS NEEDED
Status: DISCONTINUED | OUTPATIENT
Start: 2020-07-30 | End: 2020-07-30 | Stop reason: HOSPADM

## 2020-07-30 RX ORDER — OXYCODONE AND ACETAMINOPHEN 5; 325 MG/1; MG/1
1 TABLET ORAL AS NEEDED
Status: DISCONTINUED | OUTPATIENT
Start: 2020-07-30 | End: 2020-07-30 | Stop reason: HOSPADM

## 2020-07-30 RX ORDER — MAGNESIUM SULFATE 100 %
4 CRYSTALS MISCELLANEOUS AS NEEDED
Status: DISCONTINUED | OUTPATIENT
Start: 2020-07-30 | End: 2020-07-30 | Stop reason: HOSPADM

## 2020-07-30 RX ORDER — IBUPROFEN 800 MG/1
800 TABLET ORAL
Qty: 40 TAB | Refills: 0 | Status: SHIPPED | OUTPATIENT
Start: 2020-07-30

## 2020-07-30 RX ORDER — ONDANSETRON 2 MG/ML
INJECTION INTRAMUSCULAR; INTRAVENOUS AS NEEDED
Status: DISCONTINUED | OUTPATIENT
Start: 2020-07-30 | End: 2020-07-30 | Stop reason: HOSPADM

## 2020-07-30 RX ORDER — SODIUM CHLORIDE, SODIUM LACTATE, POTASSIUM CHLORIDE, CALCIUM CHLORIDE 600; 310; 30; 20 MG/100ML; MG/100ML; MG/100ML; MG/100ML
75 INJECTION, SOLUTION INTRAVENOUS CONTINUOUS
Status: DISCONTINUED | OUTPATIENT
Start: 2020-07-30 | End: 2020-07-30 | Stop reason: HOSPADM

## 2020-07-30 RX ORDER — PROPOFOL 10 MG/ML
INJECTION, EMULSION INTRAVENOUS AS NEEDED
Status: DISCONTINUED | OUTPATIENT
Start: 2020-07-30 | End: 2020-07-30 | Stop reason: HOSPADM

## 2020-07-30 RX ORDER — MIDAZOLAM HYDROCHLORIDE 1 MG/ML
INJECTION, SOLUTION INTRAMUSCULAR; INTRAVENOUS AS NEEDED
Status: DISCONTINUED | OUTPATIENT
Start: 2020-07-30 | End: 2020-07-30 | Stop reason: HOSPADM

## 2020-07-30 RX ORDER — DEXAMETHASONE SODIUM PHOSPHATE 4 MG/ML
INJECTION, SOLUTION INTRA-ARTICULAR; INTRALESIONAL; INTRAMUSCULAR; INTRAVENOUS; SOFT TISSUE AS NEEDED
Status: DISCONTINUED | OUTPATIENT
Start: 2020-07-30 | End: 2020-07-30 | Stop reason: HOSPADM

## 2020-07-30 RX ORDER — HYDROMORPHONE HYDROCHLORIDE 2 MG/ML
0.2 INJECTION, SOLUTION INTRAMUSCULAR; INTRAVENOUS; SUBCUTANEOUS AS NEEDED
Status: DISCONTINUED | OUTPATIENT
Start: 2020-07-30 | End: 2020-07-30 | Stop reason: HOSPADM

## 2020-07-30 RX ORDER — HYDROMORPHONE HYDROCHLORIDE 2 MG/ML
0.5 INJECTION, SOLUTION INTRAMUSCULAR; INTRAVENOUS; SUBCUTANEOUS
Status: DISCONTINUED | OUTPATIENT
Start: 2020-07-30 | End: 2020-07-30 | Stop reason: HOSPADM

## 2020-07-30 RX ORDER — HALOPERIDOL 5 MG/ML
1 INJECTION INTRAMUSCULAR
Status: DISCONTINUED | OUTPATIENT
Start: 2020-07-30 | End: 2020-07-30 | Stop reason: HOSPADM

## 2020-07-30 RX ORDER — FENTANYL CITRATE 50 UG/ML
INJECTION, SOLUTION INTRAMUSCULAR; INTRAVENOUS AS NEEDED
Status: DISCONTINUED | OUTPATIENT
Start: 2020-07-30 | End: 2020-07-30 | Stop reason: HOSPADM

## 2020-07-30 RX ADMIN — FENTANYL CITRATE 50 MCG: 50 INJECTION, SOLUTION INTRAMUSCULAR; INTRAVENOUS at 09:52

## 2020-07-30 RX ADMIN — OXYCODONE HYDROCHLORIDE AND ACETAMINOPHEN 1 TABLET: 5; 325 TABLET ORAL at 12:05

## 2020-07-30 RX ADMIN — PROPOFOL 180 MG: 10 INJECTION, EMULSION INTRAVENOUS at 09:45

## 2020-07-30 RX ADMIN — SODIUM CHLORIDE 10 ML: 9 INJECTION, SOLUTION INTRAMUSCULAR; INTRAVENOUS; SUBCUTANEOUS at 08:52

## 2020-07-30 RX ADMIN — ROCURONIUM BROMIDE 30 MG: 50 INJECTION INTRAVENOUS at 09:53

## 2020-07-30 RX ADMIN — FAMOTIDINE 20 MG: 10 INJECTION, SOLUTION INTRAVENOUS at 08:50

## 2020-07-30 RX ADMIN — INDOCYANINE GREEN AND WATER 3 MG: KIT at 08:51

## 2020-07-30 RX ADMIN — KETOROLAC TROMETHAMINE 15 MG: 15 INJECTION, SOLUTION INTRAMUSCULAR; INTRAVENOUS at 11:00

## 2020-07-30 RX ADMIN — ONDANSETRON 4 MG: 2 INJECTION INTRAMUSCULAR; INTRAVENOUS at 11:00

## 2020-07-30 RX ADMIN — SODIUM CHLORIDE, SODIUM LACTATE, POTASSIUM CHLORIDE, AND CALCIUM CHLORIDE 75 ML/HR: 600; 310; 30; 20 INJECTION, SOLUTION INTRAVENOUS at 08:40

## 2020-07-30 RX ADMIN — DEXAMETHASONE SODIUM PHOSPHATE 4 MG: 4 INJECTION, SOLUTION INTRAMUSCULAR; INTRAVENOUS at 09:53

## 2020-07-30 RX ADMIN — FENTANYL CITRATE 50 MCG: 50 INJECTION, SOLUTION INTRAMUSCULAR; INTRAVENOUS at 09:37

## 2020-07-30 RX ADMIN — GLYCOPYRROLATE 0.4 MG: 0.2 INJECTION INTRAMUSCULAR; INTRAVENOUS at 11:00

## 2020-07-30 RX ADMIN — SUCCINYLCHOLINE CHLORIDE 160 MG: 20 INJECTION, SOLUTION INTRAMUSCULAR; INTRAVENOUS at 09:45

## 2020-07-30 RX ADMIN — Medication 4 MG: at 11:00

## 2020-07-30 RX ADMIN — SODIUM CHLORIDE, SODIUM LACTATE, POTASSIUM CHLORIDE, AND CALCIUM CHLORIDE: 600; 310; 30; 20 INJECTION, SOLUTION INTRAVENOUS at 10:20

## 2020-07-30 RX ADMIN — FENTANYL CITRATE 100 MCG: 50 INJECTION, SOLUTION INTRAMUSCULAR; INTRAVENOUS at 09:45

## 2020-07-30 RX ADMIN — INSULIN LISPRO 3 UNITS: 100 INJECTION, SOLUTION INTRAVENOUS; SUBCUTANEOUS at 11:36

## 2020-07-30 RX ADMIN — LIDOCAINE HYDROCHLORIDE 80 MG: 20 INJECTION, SOLUTION EPIDURAL; INFILTRATION; INTRACAUDAL; PERINEURAL at 09:45

## 2020-07-30 RX ADMIN — MIDAZOLAM HYDROCHLORIDE 2 MG: 2 INJECTION, SOLUTION INTRAMUSCULAR; INTRAVENOUS at 09:37

## 2020-07-30 RX ADMIN — CEFOTETAN DISODIUM 2 G: 2 INJECTION, POWDER, FOR SOLUTION INTRAMUSCULAR; INTRAVENOUS at 09:58

## 2020-07-30 NOTE — DISCHARGE INSTRUCTIONS
[place] [address] [facility]  Talita Dyer MD, [facility]  General Surgery    Pt may remove the dressing and shower in two days. Allow soap and water to run over the incision. No driving or operating heavy machinery while on narcotic pain medications. Please apply an ice pack to the operative site for 30 minutes 3 times daily to help reduce pain and swelling and the need for narcotic pain medication. No strenuous activity or contact sports for two weeks. No lifting greater than 15 lbs for 2 weeks. Call MD for any redness, swelling, bleeding or pus at the incision. Also call for any nausea, vomiting, increased pain or pain uncontrolled by pain medicine. Low-Fat Diet for Gallbladder Disease: After Your Visit  Your Care Instructions  When you eat, the gallbladder releases bile, which helps you digest the fat in food. If you have an inflamed gallbladder, this may cause pain. A low-fat diet may give your gallbladder a rest so you can start to heal. Your doctor and dietitian can help you make an eating plan that does not irritate your digestive system. Always talk with your doctor or dietitian before you make changes in your diet. Follow-up care is a key part of your treatment and safety. Be sure to make and go to all appointments, and call your doctor if you are having problems. Its also a good idea to know your test results and keep a list of the medicines you take. How can you care for yourself at home? · Eat many small meals and snacks each day instead of three large meals. · Choose lean meats. ¨ Eat no more than 5 to 6½ ounces of meat a day. ¨ Cut off all fat you can see. ¨ Eat chicken and turkey without the skin. ¨ Many types of fish, such as salmon, lake trout, tuna, and herring, provide healthy omega-3 fat. But, avoid fish canned in oil, such as sardines in olive oil. ¨ Bake, broil, or grill meats, fowl, or fish instead of frying them in butter or fat.   · Drink or eat nonfat or low-fat milk, yogurt, cheese, or other milk products each day. ¨ Read the labels on cheeses, and choose those with less than 5 grams of fat an ounce. ¨ Try fat-free sour cream, cream cheese, or yogurt. ¨ Avoid cream soups and cream sauces on pasta. ¨ Eat low-fat ice cream, frozen yogurt, or sorbet. Avoid regular ice cream.  · Eat whole-grain cereals, breads, crackers, rice, or pasta. Avoid high-fat foods such as croissants, scones, biscuits, waffles, doughnuts, muffins, granola, and high-fat breads. · Flavor your foods with herbs and spices (such as basil, tarragon, or mint), fat-free sauces, or lemon juice instead of butter. You can also use butter substitutes, fat-free mayonnaise, or fat-free dressing. · Try applesauce, prune puree, or mashed bananas to replace some or all of the fat when you bake. · Limit fats and oils, such as butter, margarine, mayonnaise, and salad dressing, to no more than 1 tablespoon a meal.  · Avoid high-fat foods, such as:  ¨ Chocolate, whole milk, ice cream, processed cheese, and egg yolks. ¨ Fried or buttered foods. ¨ Ham, salami, and sorenson. ¨ Cinnamon rolls, cakes, pies, cookies, and other pastries. ¨ Prepared snack foods, such as potato chips, nut and granola bars, and mixed nuts. ¨ Coconut and avocado. · Learn how to read food labels for serving sizes and ingredients. Fast-food and convenience-food meals often have lots of fat. Where can you learn more? Go to Educer[employer].be  Enter T924 in the search box to learn more about \"Low-Fat Diet for Gallbladder Disease: After Your Visit. \"   © 1944-8719 Healthwise, Incorporated. Care instructions adapted under license by [facility] (which disclaims liability or warranty for this information).  This care instruction is for use with your licensed healthcare professional. If you have questions about a medical condition or this instruction, always ask your healthcare professional. Maribel Hadley disclaims any warranty or liability for your use of this information. Content Version: 1.2.324713; Last Revised: August 31, 2011    Patient Education        High-Fiber Diet: Care Instructions  Your Care Instructions     A high-fiber diet may help you relieve constipation and feel less bloated. Your doctor and dietitian will help you make a high-fiber eating plan based on your personal needs. The plan will include the things you like to eat. It will also make sure that you get 30 grams of fiber a day. Before you make changes to the way you eat, be sure to talk with your doctor or dietitian. Follow-up care is a key part of your treatment and safety. Be sure to make and go to all appointments, and call your doctor if you are having problems. It's also a good idea to know your test results and keep a list of the medicines you take. How can you care for yourself at home? · You can increase how much fiber you get if you eat more of certain foods. These foods include:  ? Whole-grain breads and cereals. ? Fruits, such as pears, apples, and peaches. Eat the skins, peels, and seeds, if you can.  ? Vegetables, such as broccoli, cabbage, spinach, carrots, asparagus, and squash. ? Starchy vegetables. These include potatoes with skins, kidney beans, and lima beans. · Take a fiber supplement every day if your doctor recommends it. Examples are Benefiber, Citrucel, FiberCon, and Metamucil. Ask your doctor how much to take. · Drink plenty of fluids, enough so that your urine is light yellow or clear like water. If you have kidney, heart, or liver disease and have to limit fluids, talk with your doctor before you increase the amount of fluids you drink. · Get some exercise every day. Exercise helps stool move through the colon. It also helps prevent constipation. · Keep a food diary. Try to notice and write down what foods cause gas, pain, or other symptoms. Then you can avoid these foods. Where can you learn more?   Go to http://trudi-peng.info/  Enter L409 in the search box to learn more about \"High-Fiber Diet: Care Instructions. \"  Current as of: August 22, 2019               Content Version: 12.5  © 2006-2020 [employer]. Care instructions adapted under license by [employer] (which disclaims liability or warranty for this information). If you have questions about a medical condition or this instruction, always ask your healthcare professional. [address] any warranty or liability for your use of this information. Patient Education        Cholecystectomy: What to Expect at Home  Your Recovery  After your surgery, it is normal to feel weak and tired for several days after you return home. Your belly may be swollen. If you had laparoscopic surgery, you may also have pain in your shoulder for about 24 hours. You may have gas or need to burp a lot at first, and a few people get diarrhea. The diarrhea usually goes away in 2 to 4 weeks, but it may last longer. How quickly you recover depends on whether you had a laparoscopic or open surgery. · For a laparoscopic surgery, most people can go back to work or their normal routine in 1 to 2 weeks, but it may take longer, depending on the type of work you do. · For an open surgery, it will probably take 4 to 6 weeks before you get back to your normal routine. This care sheet gives you a general idea about how long it will take for you to recover. However, each person recovers at a different pace. Follow the steps below to get better as quickly as possible. How can you care for yourself at home? Activity  · Rest when you feel tired. Getting enough sleep will help you recover. · Try to walk each day. Start out by walking a little more than you did the day before. Gradually increase the amount you walk. Walking boosts blood flow and helps prevent pneumonia and constipation.   · For about 2 to 4 weeks, avoid lifting anything that would make you strain. This may include a child, heavy grocery bags and milk containers, a heavy briefcase or backpack, cat litter or dog food bags, or a vacuum . · Avoid strenuous activities, such as biking, jogging, weightlifting, and aerobic exercise, until your doctor says it is okay. · You may shower 24 to 48 hours after surgery, if your doctor okays it. Pat the cut (incision) dry. Do not take a bath for the first 2 weeks, or until your doctor tells you it is okay. · You may drive when you are no longer taking pain medicine and can quickly move your foot from the gas pedal to the brake. You must also be able to sit comfortably for a long period of time, even if you do not plan to go far. You might get caught in traffic. · For a laparoscopic surgery, most people can go back to work or their normal routine in 1 to 2 weeks, but it may take longer. For an open surgery, it will probably take 4 to 6 weeks before you get back to your normal routine. · Your doctor will tell you when you can have sex again. Diet  · Eat smaller meals more often instead of fewer larger meals. You can eat a normal diet, but avoid eating fatty foods for about 1 month. Fatty foods include hamburger, whole milk, cheese, and many snack foods. If your stomach is upset, try bland, low-fat foods like plain rice, broiled chicken, toast, and yogurt. · Drink plenty of fluids (unless your doctor tells you not to). · If you have diarrhea, try avoiding spicy foods, dairy products, fatty foods, and alcohol. You can also watch to see if specific foods cause it, and stop eating them. If the diarrhea continues for more than 2 weeks, talk to your doctor. · You may notice that your bowel movements are not regular right after your surgery. This is common. Try to avoid constipation and straining with bowel movements. You may want to take a fiber supplement every day.  If you have not had a bowel movement after a couple of days, ask your doctor about taking a mild laxative. Medicines  · Your doctor will tell you if and when you can restart your medicines. He or she will also give you instructions about taking any new medicines. · If you take aspirin or some other blood thinner, ask your doctor if and when to start taking it again. Make sure that you understand exactly what your doctor wants you to do. · Take pain medicines exactly as directed. ? If the doctor gave you a prescription medicine for pain, take it as prescribed. ? If you are not taking a prescription pain medicine, take an over-the-counter medicine such as acetaminophen (Tylenol), ibuprofen (Advil, Motrin), or naproxen (Aleve). Read and follow all instructions on the label. ? Do not take two or more pain medicines at the same time unless the doctor told you to. Many pain medicines contain acetaminophen, which is Tylenol. Too much Tylenol can be harmful. · If you think your pain medicine is making you sick to your stomach:  ? Take your medicine after meals (unless your doctor tells you not to). ? Ask your doctor for a different pain medicine. · If your doctor prescribed antibiotics, take them as directed. Do not stop taking them just because you feel better. You need to take the full course of antibiotics. Incision care  · If you have strips of tape on the incision, or cut, leave the tape on for a week or until it falls off. · After 24 to 48 hours, wash the area daily with warm, soapy water, and pat it dry. · You may have staples to hold the cut together. Keep them dry until your doctor takes them out. This is usually in 7 to 10 days. · Keep the area clean and dry. You may cover it with a gauze bandage if it weeps or rubs against clothing. Change the bandage every day. Ice  · To reduce swelling and pain, put ice or a cold pack on your belly for 10 to 20 minutes at a time. Do this every 1 to 2 hours. Put a thin cloth between the ice and your skin.   Follow-up care is a key part of your treatment and safety. Be sure to make and go to all appointments, and call your doctor if you are having problems. It's also a good idea to know your test results and keep a list of the medicines you take. When should you call for help? [id number] anytime you think you may need emergency care. For example, call if:  · You passed out (lost consciousness). · You are short of breath. .  Call your doctor now or seek immediate medical care if:  · You are sick to your stomach and cannot drink fluids. · You have pain that does not get better when you take your pain medicine. · You cannot pass stools or gas. · You have signs of infection, such as:  ? Increased pain, swelling, warmth, or redness. ? Red streaks leading from the incision. ? Pus draining from the incision. ? A fever. · Bright red blood has soaked through the bandage over your incision. · You have loose stitches, or your incision comes open. · You have signs of a blood clot in your leg (called a deep vein thrombosis), such as:  ? Pain in your calf, back of knee, thigh, or groin. ? Redness and swelling in your leg or groin. Watch closely for any changes in your health, and be sure to contact your doctor if you have any problems. Where can you learn more? Go to http://trudi-peng.info/  Enter F357 in the search box to learn more about \"Cholecystectomy: What to Expect at Home. \"  Current as of: August 12, 2019               Content Version: 12.5  © 3218-3070 Healthwise, Incorporated. Care instructions adapted under license by [employer] (which disclaims liability or warranty for this information). If you have questions about a medical condition or this instruction, always ask your healthcare professional. [place] [address] any warranty or liability for your use of this information.     Patient Education   Oxycodone/Acetaminophen (By mouth)   Acetaminophen (z-falo-i-MIN-oh-fen), Oxycodone Hydrochloride (pi-h-BSC-done ld-droe-KLOR-elias)  Treats moderate to moderately severe pain. This medicine is a narcotic pain reliever. Brand Name(s): Endocet, Percocet, Primlev, Xartemis XR   There may be other brand names for this medicine. When This Medicine Should Not Be Used: This medicine is not right for everyone. Do not use it if you had an allergic reaction to acetaminophen or oxycodone, or if you have serious breathing problems or paralytic ileus. How to Use This Medicine:   Capsule, Liquid, Tablet, Long Acting Tablet  · Your doctor will tell you how much medicine to use. Do not use more than directed. · An overdose can be dangerous. Follow directions carefully so you do not get too much medicine at one time. · Oral liquid: Measure the oral liquid medicine with a marked measuring spoon, oral syringe, or medicine cup. · Swallow the extended-release tablet whole. Do not crush, break, or chew it. Do not lick or wet the tablet before placing it in your mouth. Do not give this medicine through a feeding tube. · This medicine should come with a Medication Guide. Ask your pharmacist for a copy if you do not have one. · Missed dose: If you miss a dose of this medicine, skip the missed dose and go back to your regular dosing schedule. Do not double doses. · Store the medicine in a closed container at room temperature, away from heat, moisture, and direct light. Ask your pharmacist about the best way to dispose of medicine you do not use. Drugs and Foods to Avoid:   Ask your doctor or pharmacist before using any other medicine, including over-the-counter medicines, vitamins, and herbal products. · Do not use Xartemis XR if you are using or have used an MAO inhibitor in the past 14 days. · Some medicines can affect how this medicine works.  Tell your doctor if you are using any of the following:   ¨ Carbamazepine, erythromycin, ketoconazole, lamotrigine, mirtazapine, naltrexone, phenytoin, propranolol, rifampin, ritonavir, tramadol, trazodone, or zidovudine  ¨ Birth control pills  ¨ Diuretic (water pill)  ¨ Medicine to treat depression  ¨ Phenothiazine medicine  ¨ Triptan medicine to treat migraine headaches  · Do not drink alcohol while you are using this medicine. Acetaminophen can damage your liver, and alcohol can increase this risk. Do not take acetaminophen without asking your doctor if you have 3 or more drinks of alcohol every day. · Tell your doctor if you use anything else that makes you sleepy. Some examples are allergy medicine, narcotic pain medicine, and alcohol. Tell your doctor if you are using buprenorphine, butorphanol, nalbuphine, pentazocine, a benzodiazepine, or a muscle relaxer. Warnings While Using This Medicine:   · Tell your doctor if you are pregnant or breastfeeding, or if you have kidney disease, liver disease, heart disease, low blood pressure, breathing problems or lung disease (such as asthma, COPD), thyroid problems, Franklin disease, pancreas or gallbladder problems, prostate problems, trouble urinating, or a stomach problems, or a history of head injury or brain damage, seizures, or alcohol or drug abuse. Tell your doctor if you are allergic to codeine. · This medicine may cause the following problems:  ¨ High risk of overdose, which can lead to death  ¨ Respiratory depression (serious breathing problem that can be life-threatening)  ¨ Liver problems  ¨ Serious skin reactions  ¨ Serotonin syndrome (when used with certain medicines)  · This medicine may make you dizzy or drowsy. Do not drive or do anything that could be dangerous until you know how this medicine affects you. Sit or lie down if you feel dizzy. Stand up carefully. · This medicine contains acetaminophen.  Read the labels of all other medicines you are using to see if they also contain acetaminophen, or ask your doctor or pharmacist. [place] not use more than 4 grams (4,000 milligrams) total of acetaminophen in one day. · This medicine can be habit-forming. Do not use more than your prescribed dose. Call your doctor if you think your medicine is not working. · Do not stop using this medicine suddenly. Your doctor will need to slowly decrease your dose before you stop it completely. · This medicine could cause infertility. Talk with your doctor before using this medicine if you plan to have children. · This medicine may cause constipation, especially with long-term use. Ask your doctor if you should use a laxative to prevent and treat constipation. · Keep all medicine out of the reach of children. Never share your medicine with anyone. Possible Side Effects While Using This Medicine:   Call your doctor right away if you notice any of these side effects:  · Allergic reaction: Itching or hives, swelling in your face or hands, swelling or tingling in your mouth or throat, chest tightness, trouble breathing  · Anxiety, restlessness, fast heartbeat, fever, muscle spasms, twitching, diarrhea, seeing or hearing things that are not there  · Blistering, peeling, red skin rash  · Blue lips, fingernails, or skin  · Dark urine or pale stools, loss of appetite, stomach pain, yellow skin or eyes  · Extreme weakness, shallow breathing, uneven heartbeat, seizures, sweating, or cold or clammy skin  · Severe confusion, lightheadedness, dizziness, or fainting  · Severe constipation, nausea, or vomiting  · Trouble breathing or slow breathing  If you notice these less serious side effects, talk with your doctor:   · Headache  · Mild constipation, nausea, or vomiting  · Mild sleepiness or drowsiness  If you notice other side effects that you think are caused by this medicine, tell your doctor. Call your doctor for medical advice about side effects.  You may report side effects to FDA at 4-916-FDA-3268  © 2017 [facility] Information is for End User's use only and may not be sold, redistributed or otherwise used for commercial purposes. The above information is an  only. It is not intended as medical advice for individual conditions or treatments. Talk to your doctor, nurse or pharmacist before following any medical regimen to see if it is safe and effective for you. DISCHARGE SUMMARY from Nurse    PATIENT INSTRUCTIONS:    After general anesthesia or intravenous sedation, for 24 hours or while taking prescription Narcotics:  · Limit your activities  · Do not drive and operate hazardous machinery  · Do not make important personal or business decisions  · Do  not drink alcoholic beverages  · If you have not urinated within 8 hours after discharge, please contact your surgeon on call. Report the following to your surgeon:  · Excessive pain, swelling, redness or odor of or around the surgical area  · Temperature over 100.5  · Nausea and vomiting lasting longer than 4 hours or if unable to take medications  · Any signs of decreased circulation or nerve impairment to extremity: change in color, persistent  numbness, tingling, coldness or increase pain  · Any questions    What to do at Home:  Recommended activity: Activity as tolerated and no driving for today, or while taking Percocet. *  Please give a list of your current medications to your Primary Care Provider. *  Please update this list whenever your medications are discontinued, doses are      changed, or new medications (including over-the-counter products) are added. *  Please carry medication information at all times in case of emergency situations. These are general instructions for a healthy lifestyle:    No smoking/ No tobacco products/ Avoid exposure to second hand smoke  Surgeon General's Warning:  Quitting smoking now greatly reduces serious risk to your health.     Obesity, smoking, and sedentary lifestyle greatly increases your risk for illness    A healthy diet, regular physical exercise & weight monitoring are important for maintaining a healthy lifestyle    You may be retaining fluid if you have a history of heart failure or if you experience any of the following symptoms:  Weight gain of 3 pounds or more overnight or 5 pounds in a week, increased swelling in our hands or feet or shortness of breath while lying flat in bed. Please call your doctor as soon as you notice any of these symptoms; do not wait until your next office visit. The discharge information has been reviewed with the patient. The patient verbalized understanding. Discharge medications reviewed with the patient and appropriate educational materials and side effects teaching were provided.

## 2020-07-30 NOTE — INTERVAL H&P NOTE
Update History & Physical    The Patient's History and Physical of July 24, 2020 was reviewed with the patient and I examined the patient. There was no change. The surgical site was confirmed by the patient and me. Plan:  The risk, benefits, expected outcome, and alternative to the recommended procedure have been discussed with the patient. Patient understands and wants to proceed with the procedure.     Electronically signed by Nisha Mak MD on 7/30/2020 at 9:00 AM

## 2020-07-30 NOTE — ANESTHESIA POSTPROCEDURE EVALUATION
Procedure(s):  ROBOTIC ASSISTED LAPAROSCOPIC CHOLECYSTECTOMY WITH FIREFLY  ROBOTIC ASSISTED UMBILICAL HERNIA REPAIR. general    Anesthesia Post Evaluation      Multimodal analgesia: multimodal analgesia used between 6 hours prior to anesthesia start to PACU discharge  Patient location during evaluation: PACU  Patient participation: complete - patient participated  Level of consciousness: awake  Pain management: adequate  Airway patency: patent  Anesthetic complications: no  Cardiovascular status: acceptable  Respiratory status: acceptable  Hydration status: acceptable  Post anesthesia nausea and vomiting:  controlled  Final Post Anesthesia Temperature Assessment:  Normothermia (36.0-37.5 degrees C)      INITIAL Post-op Vital signs:   Vitals Value Taken Time   /77 7/30/2020 11:57 AM   Temp 36.4 °C (97.5 °F) 7/30/2020 11:27 AM   Pulse 82 7/30/2020 12:06 PM   Resp 14 7/30/2020 12:06 PM   SpO2 99 % 7/30/2020 12:06 PM   Vitals shown include unvalidated device data.

## 2020-07-30 NOTE — ANESTHESIA PREPROCEDURE EVALUATION
Relevant Problems   No relevant active problems       Anesthetic History     PONV     Comments: Scopolamine patch ordered     Review of Systems / Medical History  Patient summary reviewed and pertinent labs reviewed    Pulmonary            Asthma : well controlled       Neuro/Psych   Within defined limits           Cardiovascular    Hypertension                   GI/Hepatic/Renal                Endo/Other    Diabetes: well controlled, type 2, using insulin  Hypothyroidism: well controlled  Obesity     Other Findings              Physical Exam    Airway  Mallampati: II  TM Distance: 4 - 6 cm  Neck ROM: normal range of motion   Mouth opening: Normal     Cardiovascular    Rhythm: regular  Rate: normal         Dental  No notable dental hx       Pulmonary                Comments: Non labored Abdominal  GI exam deferred       Other Findings            Anesthetic Plan    ASA: 3  Anesthesia type: general          Induction: Intravenous  Anesthetic plan and risks discussed with: Patient

## 2020-07-30 NOTE — DISCHARGE SUMMARY
4 Leanna Estevez MD, [address]  General Surgery  Discharge Summary     Patient ID:  Guru Goldman  [id number]  9 y.o.  1970    Admit Date: 7/30/2020    Discharge Date: 7/30/2020    Admission Diagnoses: Umbilical hernia without obstruction and without gangrene [K42.9]; Biliary dyskinesia [K82.8]    Discharge Diagnoses:    Problem List as of 7/30/2020 Date Reviewed: 7/24/2020          Codes Class Noted - Resolved    Biliary dyskinesia ICD-10-CM: K82.8  ICD-9-CM: 575.8  7/30/2020 - Present        Umbilical hernia without obstruction and without gangrene ICD-10-CM: K42.9  ICD-9-CM: 553.1  7/30/2020 - Present        Severe obesity ([address] ICD-10-CM: E66.01  ICD-9-CM: 278.01  7/2/2020 - Present               Admission Condition: Good    Discharge Condition: Good    Last Procedure: Procedure(s):  ROBOTIC ASSISTED LAPAROSCOPIC CHOLECYSTECTOMY WITH FIREFLY  ROBOTIC ASSISTED UMBILICAL [address] Course:   Normal hospital course for this procedure. Consults: None    Significant Diagnostic Studies: None    Disposition: home    Patient Instructions:   Current Discharge Medication List      START taking these medications    Details   oxyCODONE-acetaminophen (Percocet) 5-325 mg per tablet Take 1 Tab by mouth every six (6) hours as needed for Pain for up to 7 days. Max Daily Amount: 4 Tabs. Indications: pain  Qty: 25 Tab, Refills: 0    Associated Diagnoses: Biliary dyskinesia; Umbilical hernia without obstruction and without gangrene      docusate sodium (COLACE) 100 mg capsule Take 1 Cap by mouth two (2) times a day for 90 days. Qty: 60 Cap, Refills: 2      bisacodyL 5 mg tab Take 5 mg by mouth daily. Qty: 3 Tab, Refills: 0      ibuprofen (MOTRIN) 800 mg tablet Take 1 Tab by mouth three (3) times daily as needed for Pain.   Qty: 40 Tab, Refills: 0         CONTINUE these medications which have NOT CHANGED    Details   carisoprodoL (SOMA) 350 mg tablet every eight (8) hours as needed. cholecalciferol (VITAMIN D3) (5000 Units/125 mcg) tab tablet Take 5,000 Units by mouth daily. Lantus Solostar U-100 Insulin 100 unit/mL (3 mL) inpn 20 Units nightly. HumaLOG KwikPen Insulin 200 unit/mL (3 mL) inpn 4 Units Before breakfast, lunch, and dinner. fluticasone propionate (FLONASE) 50 mcg/actuation nasal spray 2 Sprays by Both Nostrils route daily. dextroamphetamine-amphetamine (ADDERALL) 20 mg tablet 20 mg two (2) times a day. cyclobenzaprine (FLEXERIL) 10 mg tablet three (3) times daily as needed. VENTOLIN HFA 90 mcg/actuation inhaler every six (6) hours as needed. amLODIPine (NORVASC) 10 mg tablet 10 mg daily. ADVAIR DISKUS 250-50 mcg/dose diskus inhaler 1 Puff two (2) times a day. metFORMIN (GLUCOPHAGE) 1,000 mg tablet 1,000 mg two (2) times a day. metoprolol succinate (TOPROL-XL) 200 mg XL tablet 200 mg daily. [place] Thyroid 90 mg tablet 90 mg daily. Activity: See surgical instructions  Diet: Low fat, Low cholesterol  Wound Care: As directed    Follow-up with Dr. Nickolas Ledbetter in 2 weeks.   Follow-up tests/labs None    Signed:  Natalya Saunders MD  7/30/2020  9:13 AM

## 2020-07-30 NOTE — OP NOTES
[place] [address] [address]                                 OPERATIVE REPORT    PATIENT:    Rajendra Lord  MRN:           [id number]   DATE:   2020  BILLIN  ROOM:       /  ATTENDING:   Remonia Goldberg, MD  DICTATING:   Remonia Goldberg, MD      PREOPERATIVE DIAGNOSIS:  Biliary dyskinesia and umbilical hernia    POSTOPERATIVE DIAGNOSIS: same     PROCEDURES PERFORMED: Robotic assisted laparoscopic cholecystectomy with fire fly and primary umbilical hernia repair    SURGEON: Ralph Falk MD    ASSISTANT: Jaky Cintron SA    ANESTHESIA: General and local 0.25% Marcaine with epinephrine. FINDINGS: umbilical hernia and biliary dyskinesia    SPECIMENS REMOVED: Gallbladder. ESTIMATED BLOOD LOSS: 30 mL    FLUIDS:  1200 mL    IMPLANTS:  None    OPERATIVE INDICATION: biliary dyskinesia and umbilical hernia    DESCRIPTION OF PROCEDURE: The patient was identified in the holding area, where consent for robot assisted laparoscopic cholecystectomy with firefly and primary umbilical hernia repairwas verified. Once in the operating room, the patient was placed supine and general anesthesia was induced. The abdomen was prepped and draped in sterile fashion using chlorhexidine solution and sterile drapes. Patient had 3 mg of indocyanine green injected in the holding area. The robotic and laparoscopic equipment were assembled and proper functioning was visualized prior to making the initial incision. The local anesthetic was infiltrated into the skin and deep dermal tissues at each incision prior to the incision being made. The initial trocar was placed via a cut down technique at the umbilicus to remove the hernia sac. The 0 Vicryl suture was used to secure the Agusto trocar to the fascia. The insufflation was obtained using carbon dioxide gas to 15 mmHg.  The 8 mm blunt tipped trocar was then placed in the left upper quadrant. An 8 mm trocar in the right lower quadrant. A 5 mm trocar was placed in the right subcostal position. The patient was placed in reverse Trendelenburg with the right side up. The surgical cart was guided into position and the trocars were docked. The fundus of the gallbladder was identified and retracted anterior,  cephalad, and lateral.  Blunt dissection was employed to visualize the cystic duct infundibulum junction. Firefly was used visualize the cystic duct and common bile duct. The cystic duct was dissected free circumferentially and clipped and ligated in standard fashion. The cystic artery was identified and clipped and ligated in standard fashion. The gallbladder was removed from the gallbladder fossa using electrocautery for both hemostasis and dissection. The gallbladder was removed from the peritoneal cavity at the umbilical trocar. The area of the clips was inspected and found to be free of  any bleeding or bile leak. The pneumoperitoneum was allowed to deflate. The hernia defect was closed with 0 Vicryl suture and the [employer]. 4-0 Monocryl suture was used to close the incision sites in interrupted stitches. Mastisol, steristrips and bandaids were used to create dressings. The patient tolerated the procedure very well. DISPOSITION: She was extubated and stable upon transport to the recovery  room.       Raghav Das MD, FACS

## 2020-08-14 ENCOUNTER — OFFICE VISIT (OUTPATIENT)
Dept: SURGERY | Age: 50
End: 2020-08-14

## 2020-08-14 VITALS
DIASTOLIC BLOOD PRESSURE: 90 MMHG | RESPIRATION RATE: 17 BRPM | HEART RATE: 89 BPM | SYSTOLIC BLOOD PRESSURE: 145 MMHG | BODY MASS INDEX: 33.8 KG/M2 | TEMPERATURE: 97.3 F | WEIGHT: 198 LBS | HEIGHT: 64 IN | OXYGEN SATURATION: 98 %

## 2020-08-14 DIAGNOSIS — Z09 POSTOPERATIVE EXAMINATION: Primary | ICD-10-CM

## 2020-08-14 RX ORDER — PREDNISONE 10 MG/1
TABLET ORAL SEE ADMIN INSTRUCTIONS
COMMUNITY

## 2020-08-14 RX ORDER — TRIAMCINOLONE ACETONIDE 0.25 MG/G
OINTMENT TOPICAL 2 TIMES DAILY
COMMUNITY

## 2020-08-14 RX ORDER — HYDROXYZINE PAMOATE 25 MG/1
25 CAPSULE ORAL
COMMUNITY

## 2020-08-14 NOTE — PROGRESS NOTES
[facility]  General Surgery    Name: Nancy Singer MRN: [id number]   : 1970 Hospital: [facility]   Date: 2020 Admission Date: No admission date for patient encounter. Subjective: The patient describes a allergic reaction most likely contact dermatitis which started around the incisions and spread throughout her entire body. She initially took Benadryl and use hydrocortisone cream at home. She then saw her primary doctor who started oral steroids and Atarax. The rash eventually resolved. She did have a history of skin reaction to Band-Aids which she had not listed as an allergy. It is now listed as an allergy to adhesives. She states that she ate some fries and a fish fillet sandwich which caused her to have to run to the bathroom pretty quickly. Other than this she has done well from the cholecystectomy. Objective:  Vitals:    20 0948   BP: 145/90   Pulse: 89   Resp: 17   Temp: 97.3 °F (36.3 °C)   TempSrc: Oral   SpO2: 98%   Weight: 89.8 kg (198 lb)   Height: 5' 4\" (1.626 m)       Physical Exam:    General: Awake and alert, oriented x4, no apparent distress   Abdomen: abdomen is soft with incisional tenderness. Incision(s) are C/D/I. The largest area of contact dermatitis residual skin damage is at around the umbilicus. The skin is hyperpigmented and dark there is no evidence of cellulitis no vesicles or pustules. No masses, organomegaly or guarding    Current Medications:  Current Outpatient Medications   Medication Sig Dispense Refill    hydrOXYzine pamoate (VISTARIL) 25 mg capsule Take 25 mg by mouth three (3) times daily as needed.  predniSONE (STERAPRED DS) 10 mg dose pack Take  by mouth See Admin Instructions. See administration instruction per 10mg dose pack      triamcinolone acetonide (KENALOG) 0.025 % ointment Apply  to affected area two (2) times a day.  use thin layer      docusate sodium (COLACE) 100 mg capsule Take 1 Cap by mouth two (2) times a day for 90 days. 60 Cap 2    bisacodyL 5 mg tab Take 5 mg by mouth daily. 3 Tab 0    ibuprofen (MOTRIN) 800 mg tablet Take 1 Tab by mouth three (3) times daily as needed for Pain. 40 Tab 0    carisoprodoL (SOMA) 350 mg tablet every eight (8) hours as needed.  cholecalciferol (VITAMIN D3) (5000 Units/125 mcg) tab tablet Take 5,000 Units by mouth daily.  Lantus Solostar U-100 Insulin 100 unit/mL (3 mL) inpn 20 Units nightly.  HumaLOG KwikPen Insulin 200 unit/mL (3 mL) inpn 4 Units Before breakfast, lunch, and dinner.  fluticasone propionate (FLONASE) 50 mcg/actuation nasal spray 2 Sprays by Both Nostrils route daily.  dextroamphetamine-amphetamine (ADDERALL) 20 mg tablet 20 mg two (2) times a day.  cyclobenzaprine (FLEXERIL) 10 mg tablet three (3) times daily as needed.  VENTOLIN HFA 90 mcg/actuation inhaler every six (6) hours as needed.  amLODIPine (NORVASC) 10 mg tablet 10 mg daily.  ADVAIR DISKUS 250-50 mcg/dose diskus inhaler 1 Puff two (2) times a day.  metFORMIN (GLUCOPHAGE) 1,000 mg tablet 1,000 mg two (2) times a day.  metoprolol succinate (TOPROL-XL) 200 mg XL tablet 200 mg daily.  [place] Thyroid 90 mg tablet 90 mg daily. Chart and notes reviewed. Data reviewed. I have evaluated and examined the patient. IMPRESSION:   · Patient doing well following robot-assisted laparoscopic cholecystectomy with primary umbilical hernia repair.       PLAN:/DISCUSION:   · Continue low-fat diet  · Follow-up PRN        Chino Dent MD

## 2020-08-14 NOTE — PATIENT INSTRUCTIONS
Low-Fat Diet for Gallbladder Disease: After Your Visit Your Care Instructions When you eat, the gallbladder releases bile, which helps you digest the fat in food. If you have an inflamed gallbladder, this may cause pain. A low-fat diet may give your gallbladder a rest so you can start to heal. Your doctor and dietitian can help you make an eating plan that does not irritate your digestive system. Always talk with your doctor or dietitian before you make changes in your diet. Follow-up care is a key part of your treatment and safety. Be sure to make and go to all appointments, and call your doctor if you are having problems. Its also a good idea to know your test results and keep a list of the medicines you take. How can you care for yourself at home? · Eat many small meals and snacks each day instead of three large meals. · Choose lean meats. ¨ Eat no more than 5 to 6½ ounces of meat a day. ¨ Cut off all fat you can see. ¨ Eat chicken and turkey without the skin. ¨ Many types of fish, such as salmon, lake trout, tuna, and herring, provide healthy omega-3 fat. But, avoid fish canned in oil, such as sardines in olive oil. ¨ Bake, broil, or grill meats, fowl, or fish instead of frying them in butter or fat. · Drink or eat nonfat or low-fat milk, yogurt, cheese, or other milk products each day. ¨ Read the labels on cheeses, and choose those with less than 5 grams of fat an ounce. ¨ Try fat-free sour cream, cream cheese, or yogurt. ¨ Avoid cream soups and cream sauces on pasta. ¨ Eat low-fat ice cream, frozen yogurt, or sorbet. Avoid regular ice cream. 
· Eat whole-grain cereals, breads, crackers, rice, or pasta. Avoid high-fat foods such as croissants, scones, biscuits, waffles, doughnuts, muffins, granola, and high-fat breads. · Flavor your foods with herbs and spices (such as basil, tarragon, or mint), fat-free sauces, or lemon juice instead of butter.  You can also use butter substitutes, fat-free mayonnaise, or fat-free dressing. · Try applesauce, prune puree, or mashed bananas to replace some or all of the fat when you bake. · Limit fats and oils, such as butter, margarine, mayonnaise, and salad dressing, to no more than 1 tablespoon a meal. 
· Avoid high-fat foods, such as: 
¨ Chocolate, whole milk, ice cream, processed cheese, and egg yolks. ¨ Fried or buttered foods. ¨ Ham, salami, and sorenson. ¨ Cinnamon rolls, cakes, pies, cookies, and other pastries. ¨ Prepared snack foods, such as potato chips, nut and granola bars, and mixed nuts. ¨ Coconut and avocado. · Learn how to read food labels for serving sizes and ingredients. Fast-food and convenience-food meals often have lots of fat. Where can you learn more? Go to [employer] Enter T289 in the search box to learn more about \"Low-Fat Diet for Gallbladder Disease: After Your Visit. \"  
© 1747-3308 Healthwise, Incorporated. Care instructions adapted under license by [employer] (which disclaims liability or warranty for this information). This care instruction is for use with your licensed healthcare professional. If you have questions about a medical condition or this instruction, always ask your healthcare professional. [place] [address] any warranty or liability for your use of this information. Content Version: 1.6.425243; Last Revised: August 31, 2011 High-Fiber Diet: Care Instructions Your Care Instructions A high-fiber diet may help you relieve constipation and feel less bloated. Your doctor and dietitian will help you make a high-fiber eating plan based on your personal needs. The plan will include the things you like to eat. It will also make sure that you get 30 grams of fiber a day. Before you make changes to the way you eat, be sure to talk with your doctor or dietitian. Follow-up care is a key part of your treatment and safety.  Be sure to make and go to all appointments, and call your doctor if you are having problems. It's also a good idea to know your test results and keep a list of the medicines you take. How can you care for yourself at home? · You can increase how much fiber you get if you eat more of certain foods. These foods include: 
? Whole-grain breads and cereals. ? Fruits, such as pears, apples, and peaches. Eat the skins, peels, and seeds, if you can. 
? Vegetables, such as broccoli, cabbage, spinach, carrots, asparagus, and squash. ? Starchy vegetables. These include potatoes with skins, kidney beans, and lima beans. · Take a fiber supplement every day if your doctor recommends it. Examples are Benefiber, Citrucel, FiberCon, and Metamucil. Ask your doctor how much to take. · Drink plenty of fluids, enough so that your urine is light yellow or clear like water. If you have kidney, heart, or liver disease and have to limit fluids, talk with your doctor before you increase the amount of fluids you drink. · Get some exercise every day. Exercise helps stool move through the colon. It also helps prevent constipation. · Keep a food diary. Try to notice and write down what foods cause gas, pain, or other symptoms. Then you can avoid these foods. Where can you learn more? Go to http://trudi-peng.info/ Enter X371 in the search box to learn more about \"High-Fiber Diet: Care Instructions. \" Current as of: August 22, 2019               Content Version: 12.5 © 2427-4251 Healthwise, Incorporated. Care instructions adapted under license by [employer] (which disclaims liability or warranty for this information). If you have questions about a medical condition or this instruction, always ask your healthcare professional. [address] any warranty or liability for your use of this information.

## 2020-08-14 NOTE — PROGRESS NOTES
Jaclyn Liu is a 47 y.o. female (: 1970) presenting to address:    Chief Complaint   Patient presents with    Post OP Follow Up     Robotic assisted laparoscopic cholecystectomy with fire fly and primary umbilical hernia repair        Medication list and allergies have been reviewed with Jaclyn Liu and updated as of today's date. I have gone over all Medical, Surgical and Social History with Jaclyn Liu and updated/added the information accordingly. 1. Have you been to the ER, Urgent Care or Hospitalized since your last visit? NO      2. Have you followed up with your PCP or any other Physicians since your procedure/ last office visit? YES, PCP for allergic reaction.

## 2020-08-14 NOTE — LETTER
NOTIFICATION OF RETURN TO WORK / SCHOOL 
 
8/14/2020 10:12 AM 
 
Ms. Poonam Nation [address] [address] Aydee Olivo To Whom It May Concern: 
 
Poonam Nation was under the care of [address] from July 30, 2020 to August 23, 2020. She will be able to return to work on August 24, 2020 with no restrictions. If there are questions or concerns please have the patient contact our office. Sincerely, Daisy Benavides MD

## 2020-08-25 ENCOUNTER — OFFICE VISIT (OUTPATIENT)
Dept: SURGERY | Age: 50
End: 2020-08-25

## 2020-08-25 VITALS
SYSTOLIC BLOOD PRESSURE: 151 MMHG | RESPIRATION RATE: 18 BRPM | HEART RATE: 98 BPM | OXYGEN SATURATION: 99 % | HEIGHT: 64 IN | TEMPERATURE: 97.8 F | BODY MASS INDEX: 33.46 KG/M2 | WEIGHT: 196 LBS | DIASTOLIC BLOOD PRESSURE: 93 MMHG

## 2020-08-25 DIAGNOSIS — Z09 POSTOPERATIVE EXAMINATION: Primary | ICD-10-CM

## 2020-08-25 NOTE — PROGRESS NOTES
[facility]  General Surgery    Name: Idalia Barrera MRN: [id number]   : 1970 [facility]   Date: 2020 Admission Date: No admission date for patient encounter. Subjective:  Patient presents today with concerns about fibrin and slough from the umbilicus. She is 1 month out from robot-assisted laparoscopic cholecystectomy with primary umbilical hernia repair. She states that over the weekend she started to feel pressure in the umbilicus and then saw drainage and fibrinous slough. She rates the pain as a 4 out of 10. She has not taken anything for the pain. She states that she has a high pain tolerance. She has been taking her temperature. She denies any fever or chills. She has no nausea vomiting diarrhea constipation. Objective:  Vitals:    20 1119   BP: (!) 151/93   Pulse: 98   Resp: 18   Temp: 97.8 °F (36.6 °C)   SpO2: 99%   Weight: 88.9 kg (196 lb)   Height: 5' 4\" (1.626 m)       Physical Exam:    General: Awake and alert, oriented x4, no apparent distress   Abdomen: abdomen is soft with periumbilical tenderness. Incision(s) are C/D/I. Extremities well perfused present half centimeter area in the center of the umbilical incision. No surrounding erythema fluctuance or crepitance. No drainage can be expressed. No evidence of hernia recurrence. No masses, organomegaly or guarding    Current Medications:  Current Outpatient Medications   Medication Sig Dispense Refill    hydrOXYzine pamoate (VISTARIL) 25 mg capsule Take 25 mg by mouth three (3) times daily as needed.  predniSONE (STERAPRED DS) 10 mg dose pack Take  by mouth See Admin Instructions. See administration instruction per 10mg dose pack      triamcinolone acetonide (KENALOG) 0.025 % ointment Apply  to affected area two (2) times a day. use thin layer      docusate sodium (COLACE) 100 mg capsule Take 1 Cap by mouth two (2) times a day for 90 days.  60 Cap 2    bisacodyL 5 mg tab Take 5 mg by mouth daily. 3 Tab 0    ibuprofen (MOTRIN) 800 mg tablet Take 1 Tab by mouth three (3) times daily as needed for Pain. 40 Tab 0    carisoprodoL (SOMA) 350 mg tablet every eight (8) hours as needed.  cholecalciferol (VITAMIN D3) (5000 Units/125 mcg) tab tablet Take 5,000 Units by mouth daily.  Lantus Solostar U-100 Insulin 100 unit/mL (3 mL) inpn 20 Units nightly.  HumaLOG KwikPen Insulin 200 unit/mL (3 mL) inpn 4 Units Before breakfast, lunch, and dinner.  fluticasone propionate (FLONASE) 50 mcg/actuation nasal spray 2 Sprays by Both Nostrils route daily.  dextroamphetamine-amphetamine (ADDERALL) 20 mg tablet 20 mg two (2) times a day.  cyclobenzaprine (FLEXERIL) 10 mg tablet three (3) times daily as needed.  VENTOLIN HFA 90 mcg/actuation inhaler every six (6) hours as needed.  amLODIPine (NORVASC) 10 mg tablet 10 mg daily.  ADVAIR DISKUS 250-50 mcg/dose diskus inhaler 1 Puff two (2) times a day.  metFORMIN (GLUCOPHAGE) 1,000 mg tablet 1,000 mg two (2) times a day.  metoprolol succinate (TOPROL-XL) 200 mg XL tablet 200 mg daily.  [place] Thyroid 90 mg tablet 90 mg daily. Chart and notes reviewed. Data reviewed. I have evaluated and examined the patient. IMPRESSION:   · Patient with superficial breakdown of the umbilical hernia repair incision without evidence of infection or seroma hematoma. The breakdown is secondary to the cavernous shape of the umbilicus and a moist environment. PLAN:/DISCUSION:   · We discussed placing Rafat honey and a gauze dressing. The umbilicus to promote wound healing and what wick away moisture. We also discussed an abdominal binder to keep the dressing in place because of her allergy to the adhesive from the tape.   Follow-up PRN  ·         Selma Reyes MD

## 2020-08-25 NOTE — PROGRESS NOTES
Noam Spear is a 47 y.o. female  Chief Complaint   Patient presents with    Follow-up     slough in umbilical area          Is someone accompanying this pt? no    Is the patient using any DME equipment during OV? no        Vitals:    08/25/20 1119   BP: (!) 151/93   Pulse: 98   Resp: 18   Temp: 97.8 °F (36.6 °C)   SpO2: 99%   Weight: 196 lb (88.9 kg)   Height: 5' 4\" (1.626 m)        Depression Screening:  3 most recent PHQ Screens 1/25/2018   Little interest or pleasure in doing things Not at all   Feeling down, depressed, irritable, or hopeless Not at all   Total Score PHQ 2 0       Learning Assessment:  Learning Assessment 7/2/2020   PRIMARY LEARNER Patient   BARRIERS PRIMARY LEARNER NONE   PRIMARY LANGUAGE ENGLISH   LEARNER PREFERENCE PRIMARY DEMONSTRATION     READING     LISTENING   ANSWERED BY self   RELATIONSHIP SELF         Fall Risk  No flowsheet data found. Health Maintenance reviewed and discussed and ordered per Provider. Coordination of Care:  1. Have you been to the ER, urgent care clinic since your last visit? Hospitalized since your last visit? no    2. Have you seen or consulted any other health care providers outside of the [address] [place] [address] since your last visit? Include any pap smears or colon screening.  no

## 2020-11-19 ENCOUNTER — HOSPITAL ENCOUNTER (EMERGENCY)
Age: 50
Discharge: HOME | End: 2020-11-19
Attending: EMERGENCY MEDICINE | Admitting: EMERGENCY MEDICINE
Payer: COMMERCIAL

## 2020-11-19 ENCOUNTER — APPOINTMENT (OUTPATIENT)
Dept: VASCULAR SURGERY | Age: 50
End: 2020-11-19
Attending: PHYSICIAN ASSISTANT
Payer: COMMERCIAL

## 2020-11-19 VITALS
HEART RATE: 98 BPM | TEMPERATURE: 98.3 F | DIASTOLIC BLOOD PRESSURE: 100 MMHG | RESPIRATION RATE: 18 BRPM | OXYGEN SATURATION: 98 % | SYSTOLIC BLOOD PRESSURE: 170 MMHG

## 2020-11-19 DIAGNOSIS — S39.012A LOW BACK STRAIN, INITIAL ENCOUNTER: ICD-10-CM

## 2020-11-19 DIAGNOSIS — M79.89 PAIN AND SWELLING OF RIGHT LOWER EXTREMITY: ICD-10-CM

## 2020-11-19 DIAGNOSIS — M79.604 PAIN AND SWELLING OF RIGHT LOWER EXTREMITY: ICD-10-CM

## 2020-11-19 DIAGNOSIS — S16.1XXA NECK STRAIN, INITIAL ENCOUNTER: Primary | ICD-10-CM

## 2020-11-19 PROCEDURE — 93971 EXTREMITY STUDY: CPT

## 2020-11-19 PROCEDURE — 96372 THER/PROPH/DIAG INJ SC/IM: CPT

## 2020-11-19 PROCEDURE — 74011250637 HC RX REV CODE- 250/637: Performed by: PHYSICIAN ASSISTANT

## 2020-11-19 PROCEDURE — 74011250636 HC RX REV CODE- 250/636

## 2020-11-19 PROCEDURE — 99282 EMERGENCY DEPT VISIT SF MDM: CPT

## 2020-11-19 RX ORDER — ACETAMINOPHEN 325 MG/1
650 TABLET ORAL
Qty: 20 TAB | Refills: 0 | Status: SHIPPED | OUTPATIENT
Start: 2020-11-19

## 2020-11-19 RX ORDER — ACETAMINOPHEN 500 MG
1000 TABLET ORAL
Status: COMPLETED | OUTPATIENT
Start: 2020-11-19 | End: 2020-11-19

## 2020-11-19 RX ORDER — KETOROLAC TROMETHAMINE 15 MG/ML
15 INJECTION, SOLUTION INTRAMUSCULAR; INTRAVENOUS
Status: COMPLETED | OUTPATIENT
Start: 2020-11-19 | End: 2020-11-19

## 2020-11-19 RX ORDER — METAXALONE 800 MG/1
800 TABLET ORAL 4 TIMES DAILY
Qty: 20 TAB | Refills: 0 | Status: SHIPPED | OUTPATIENT
Start: 2020-11-19 | End: 2020-11-24

## 2020-11-19 RX ORDER — KETOROLAC TROMETHAMINE 15 MG/ML
INJECTION, SOLUTION INTRAMUSCULAR; INTRAVENOUS
Status: COMPLETED
Start: 2020-11-19 | End: 2020-11-19

## 2020-11-19 RX ORDER — KETOROLAC TROMETHAMINE 10 MG/1
10 TABLET, FILM COATED ORAL
Qty: 20 TAB | Refills: 0 | Status: SHIPPED | OUTPATIENT
Start: 2020-11-19

## 2020-11-19 RX ADMIN — KETOROLAC TROMETHAMINE 15 MG: 15 INJECTION, SOLUTION INTRAMUSCULAR; INTRAVENOUS at 12:40

## 2020-11-19 RX ADMIN — ACETAMINOPHEN 1000 MG: 500 TABLET ORAL at 12:40

## 2020-11-19 NOTE — LETTER
[facility] EMERGENCY DEPT 
[address] [facility] [address] [phone] Work/School Note Date: 11/19/2020 To Whom It May concern: 
 
Melissa Camarillo was seen and treated today in the emergency room by the following provider(s): 
Attending Provider: Margarito Castañeda MD 
Physician Assistant: EFREN Sierra. Melissa Camarillo may return to work on 11/22/20 Sincerely, EFREN Thomas

## 2020-11-19 NOTE — DISCHARGE INSTRUCTIONS
Patient Education        Learning About Relief for Back Pain  What is back strain? Back strain is an injury that happens when you overstretch, or pull, a muscle in your back. You may hurt your back in an accident or when you exercise or lift something. Most back pain gets better with rest and time. You can take care of yourself at home to help your back heal.  What can you do first to relieve back pain? When you first feel back pain, try these steps:  · Walk. Take a short walk (10 to 20 minutes) on a level surface (no slopes, hills, or stairs) every 2 to 3 hours. Walk only distances you can manage without pain, especially leg pain. · Relax. Find a comfortable position for rest. Some people are comfortable on the floor or a medium-firm bed with a small pillow under their head and another under their knees. Some people prefer to lie on their side with a pillow between their knees. Don't stay in one position for too long. · Try heat or ice. Try using a heating pad on a low or medium setting, or take a warm shower, for 15 to 20 minutes every 2 to 3 hours. Or you can buy single-use heat wraps that last up to 8 hours. You can also try an ice pack for 10 to 15 minutes every 2 to 3 hours. You can use an ice pack or a bag of frozen vegetables wrapped in a thin towel. There is not strong evidence that either heat or ice will help, but you can try them to see if they help. You may also want to try switching between heat and cold. · Take pain medicine exactly as directed. ? If the doctor gave you a prescription medicine for pain, take it as prescribed. ? If you are not taking a prescription pain medicine, ask your doctor if you can take an over-the-counter medicine. What else can you do? · Stretch and exercise. Exercises that increase flexibility may relieve your pain and make it easier for your muscles to keep your spine in a good, neutral position. And don't forget to keep walking. · Do self-massage.  You can use self-massage to unwind after work or school or to energize yourself in the morning. You can easily massage your feet, hands, or neck. Self-massage works best if you are in comfortable clothes and are sitting or lying in a comfortable position. Use oil or lotion to massage bare skin. · Reduce stress. Back pain can lead to a vicious [place]: Distress about the pain tenses the muscles in your back, which in turn causes more pain. Learn how to relax your mind and your muscles to lower your stress. Where can you learn more? Go to http://www.gray.com/  Enter Q517 in the search box to learn more about \"Learning About Relief for Back Pain. \"  Current as of: March 2, 2020               Content Version: 12.6  © 2006-2020 [employer]. Care instructions adapted under license by [employer] (which disclaims liability or warranty for this information). If you have questions about a medical condition or this instruction, always ask your healthcare professional. [place] [address] any warranty or liability for your use of this information. Patient Education        Neck Strain: Care Instructions  Your Care Instructions     You have strained the muscles and ligaments in your neck. A sudden, awkward movement can strain the neck. This often occurs with falls or car accidents or during certain sports. Everyday activities like working on a computer or sleeping can also cause neck strain if they force you to hold your neck in an awkward position for a long time. It is common for neck pain to get worse for a day or two after an injury, but it should start to feel better after that. You may have more pain and stiffness for several days before it gets better. This is expected. It may take a few weeks or longer for it to heal completely. Good home treatment can help you get better faster and avoid future neck problems.   Follow-up care is a key part of your treatment and safety. Be sure to make and go to all appointments, and call your doctor if you are having problems. It's also a good idea to know your test results and keep a list of the medicines you take. How can you care for yourself at home? · If you were given a neck brace (cervical collar) to limit neck motion, wear it as instructed for as many days as your doctor tells you to. Do not wear it longer than you were told to. Wearing a brace for too long can make neck stiffness worse and weaken the neck muscles. · You can try using heat or ice to see if it helps. ? Try using a heating pad on a low or medium setting for 15 to 20 minutes every 2 to 3 hours. Try a warm shower in place of one session with the heating pad. You can also buy single-use heat wraps that last up to 8 hours. ? You can also try an ice pack for 10 to 15 minutes every 2 to 3 hours. · Take pain medicines exactly as directed. ? If the doctor gave you a prescription medicine for pain, take it as prescribed. ? If you are not taking a prescription pain medicine, ask your doctor if you can take an over-the-counter medicine. · Gently rub the area to relieve pain and help with blood flow. Do not massage the area if it hurts to do so. · Do not do anything that makes the pain worse. Take it easy for a couple of days. You can do your usual activities if they do not hurt your neck or put it at risk for more stress or injury. · Try sleeping on a special neck pillow. Place it under your neck, not under your head. Placing a tightly rolled-up towel under your neck while you sleep will also work. If you use a neck pillow or rolled towel, do not use your regular pillow at the same time. · To prevent future neck pain, do exercises to stretch and strengthen your neck and back. Learn how to use good posture, safe lifting techniques, and proper body mechanics. When should you call for help? Call 911 anytime you think you may need emergency care.  For example, call if:    · You are unable to move an arm or a leg at all. Call your doctor now or seek immediate medical care if:    · You have new or worse symptoms in your arms, legs, chest, belly, or buttocks. Symptoms may include:  ? Numbness or tingling. ? Weakness. ? Pain.     · You lose bladder or bowel control. Watch closely for changes in your health, and be sure to contact your doctor if:    · You are not getting better as expected. Where can you learn more? Go to http://www.gray.com/  Enter M253 in the search box to learn more about \"Neck Strain: Care Instructions. \"  Current as of: March 2, 2020               Content Version: 12.6  © 9750-2170 [employer]. Care instructions adapted under license by [employer] (which disclaims liability or warranty for this information). If you have questions about a medical condition or this instruction, always ask your healthcare professional. [address] any warranty or liability for your use of this information.

## 2020-11-19 NOTE — ED PROVIDER NOTES
EMERGENCY DEPARTMENT HISTORY AND PHYSICAL EXAM    Date: 11/19/2020  Patient Name: Adriana Buerger    History of Presenting Illness     Chief Complaint   Patient presents with    Back Pain    Neck Pain         History Provided By: Patient    Chief Complaint: Bilateral neck pain, bilateral lower back pain, right lower extremity swelling and pain  Duration: 2-1/2 weeks  Timing: Worsening  Location: See above  Quality: Stiff and aching  Severity: Moderate to severe  Modifying Factors: Not improved with Soma, Flexeril, over-the-counter heat wraps, hot baths and stretching  Associated Symptoms: none       Additional History (Context): Adriana Buerger is a 47 y.o. female with a history of asthma, diabetes, pancreatitis who presents today for history as listed above. 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 Patient has tried multiple things at home for this but states the pain is worsened. Patient did call her primary care doctor but is unable to get in until February. Reports there was some discussion of possible physical therapy. Patient has an orthopedist but has not seen them in years. Denies any recent falls, trauma, injury, or loss of bowel or bladder. Patient denies any history of DVT or PE. Is not currently on any blood thinners. PCP: Israel Wayne MD    Current Outpatient Medications   Medication Sig Dispense Refill    ketorolac (TORADOL) 10 mg tablet Take 1 Tab by mouth every six (6) hours as needed for Pain. 20 Tab 0    acetaminophen (TYLENOL) 325 mg tablet Take 2 Tabs by mouth every four (4) hours as needed for Pain. 20 Tab 0    metaxalone (Skelaxin) 800 mg tablet Take 1 Tab by mouth four (4) times daily for 5 days. 20 Tab 0    hydrOXYzine pamoate (VISTARIL) 25 mg capsule Take 25 mg by mouth three (3) times daily as needed.  predniSONE (STERAPRED DS) 10 mg dose pack Take  by mouth See Admin Instructions. See administration instruction per 10mg dose pack      triamcinolone acetonide (KENALOG) 0.025 % ointment Apply  to affected area two (2) times a day. use thin layer      bisacodyL 5 mg tab Take 5 mg by mouth daily. 3 Tab 0    ibuprofen (MOTRIN) 800 mg tablet Take 1 Tab by mouth three (3) times daily as needed for Pain. 40 Tab 0    carisoprodoL (SOMA) 350 mg tablet every eight (8) hours as needed.  cholecalciferol (VITAMIN D3) (5000 Units/125 mcg) tab tablet Take 5,000 Units by mouth daily.  Lantus Solostar U-100 Insulin 100 unit/mL (3 mL) inpn 20 Units nightly.  HumaLOG KwikPen Insulin 200 unit/mL (3 mL) inpn 4 Units Before breakfast, lunch, and dinner.  fluticasone propionate (FLONASE) 50 mcg/actuation nasal spray 2 Sprays by Both Nostrils route daily.  dextroamphetamine-amphetamine (ADDERALL) 20 mg tablet 20 mg two (2) times a day.  cyclobenzaprine (FLEXERIL) 10 mg tablet three (3) times daily as needed.  VENTOLIN HFA 90 mcg/actuation inhaler every six (6) hours as needed.  amLODIPine (NORVASC) 10 mg tablet 10 mg daily.  ADVAIR DISKUS 250-50 mcg/dose diskus inhaler 1 Puff two (2) times a day.  metFORMIN (GLUCOPHAGE) 1,000 mg tablet 1,000 mg two (2) times a day.  metoprolol succinate (TOPROL-XL) 200 mg XL tablet 200 mg daily.  [place] Thyroid 90 mg tablet 90 mg daily.          Past History     Past Medical History:  Past Medical History:   Diagnosis Date    Asthma     Diabetes ([address]     Hypertension     Nausea & vomiting     Pancreatitis     Thyroid disease     hypothyroidism       Past Surgical History:  Past Surgical History:   Procedure Laterality Date    HX ANKLE FRACTURE TX      HX GYN      BTL hysterectomy    HX KNEE ARTHROSCOPY      right and left       Family History:  Family History   Problem Relation Age of Onset    Breast Cancer Other 27    Breast Cancer Maternal Aunt 61    Ovarian Cancer Maternal Grandmother 36    Hypertension Mother     Diabetes Mother     Hypertension Father     Diabetes Father        Social History:  Social History     Tobacco Use    Smoking status: Never Smoker    Smokeless tobacco: Never Used   Substance Use Topics    Alcohol use: Yes     Frequency: Monthly or less     Comment: occasionally    Drug use: No       Allergies: Allergies   Allergen Reactions    Ace Inhibitors Angioedema    Adhesive Hives and Itching    Adhesive Tape-Silicones Hives and Itching    Doxycycline Hives         Review of Systems   Review of Systems   Constitutional: Negative for chills and fever. HENT: Negative for congestion, rhinorrhea and sore throat. Respiratory: Negative for cough and shortness of breath. Cardiovascular: Positive for leg swelling. Negative for chest pain. Gastrointestinal: Negative for abdominal pain, blood in stool, constipation, diarrhea, nausea and vomiting. Genitourinary: Negative for dysuria, frequency and hematuria. Musculoskeletal: Positive for back pain, neck pain and neck stiffness. Negative for myalgias. Skin: Negative for rash and wound. Neurological: Negative for dizziness and headaches. All other systems reviewed and are negative. All Other Systems Negative  Physical Exam     Vitals:    11/19/20 1229   BP: (!) 170/100   Pulse: 98   Resp: 18   Temp: 98.3 °F (36.8 °C)   SpO2: 98%     Physical Exam  Vitals signs and nursing note reviewed. Constitutional:       General: She is not in acute distress. Appearance: She is well-developed. She is not diaphoretic. HENT:      Head: Normocephalic and atraumatic. Eyes:      Conjunctiva/sclera: Conjunctivae normal.   Neck:      Musculoskeletal: Normal range of motion and neck supple. Cardiovascular:      Rate and Rhythm: Normal rate and regular rhythm. Heart sounds: Normal heart sounds.    Pulmonary:      Effort: Pulmonary effort is normal. No respiratory distress. Breath sounds: Normal breath sounds. Chest:      Chest wall: No tenderness. Abdominal:      General: Bowel sounds are normal. There is no distension. Palpations: Abdomen is soft. Tenderness: There is no abdominal tenderness. There is no guarding or rebound. Musculoskeletal:         General: No deformity. Skin:     General: Skin is warm and dry. Neurological:      Mental Status: She is alert and oriented to person, place, and time. Deep Tendon Reflexes: Reflexes are normal and symmetric. Diagnostic Study Results     Labs -   No results found for this or any previous visit (from the past 12 hour(s)). Radiologic Studies -   No orders to display     CT Results  (Last 48 hours)    None        CXR Results  (Last 48 hours)    None            Medical Decision Making   I am the first provider for this patient. I reviewed the vital signs, available nursing notes, past medical history, past surgical history, family history and social history. Vital Signs-Reviewed the patient's vital signs. Records Reviewed: Nursing Notes and Old Medical Records     Procedures: None   Procedures    Provider Notes (Medical Decision Making):     Differential Diagnosis: Musculoskeletal pain, myofascial strain/sprain, muscle spasm, spondylolisthesis, spondylosis, DJD, OA, sciatica, cauda equina syndrome, radiculopathy, DVT    Plan: History and physical exam consistent with sciatica. No red flag signs or emergent need for imaging at this time. No midline tenderness. Patient is moving neck spontaneously without any difficulties. No midline tenderness, no need for emergent neck imaging at this time. Will order ultrasound of right lower extremity to rule out DVT. Will give dose of Tylenol and Toradol while in the ED.      3:00 PM  Have discussed negative preliminary read with patient. Patient reports she is feeling much better after her Tylenol and Toradol. Will discharge home with orthopedic follow-up and have stressed the importance of hydration, rest, light stretching and hot baths with Epson salt. Have also discussed the importance of strengthening her core. Patient reports she is thankful and happy with this plan. MED RECONCILIATION:  No current facility-administered medications for this encounter. Current Outpatient Medications   Medication Sig    ketorolac (TORADOL) 10 mg tablet Take 1 Tab by mouth every six (6) hours as needed for Pain.  acetaminophen (TYLENOL) 325 mg tablet Take 2 Tabs by mouth every four (4) hours as needed for Pain.  metaxalone (Skelaxin) 800 mg tablet Take 1 Tab by mouth four (4) times daily for 5 days.  hydrOXYzine pamoate (VISTARIL) 25 mg capsule Take 25 mg by mouth three (3) times daily as needed.  predniSONE (STERAPRED DS) 10 mg dose pack Take  by mouth See Admin Instructions. See administration instruction per 10mg dose pack    triamcinolone acetonide (KENALOG) 0.025 % ointment Apply  to affected area two (2) times a day. use thin layer    bisacodyL 5 mg tab Take 5 mg by mouth daily.  ibuprofen (MOTRIN) 800 mg tablet Take 1 Tab by mouth three (3) times daily as needed for Pain.  carisoprodoL (SOMA) 350 mg tablet every eight (8) hours as needed.  cholecalciferol (VITAMIN D3) (5000 Units/125 mcg) tab tablet Take 5,000 Units by mouth daily.  Lantus Solostar U-100 Insulin 100 unit/mL (3 mL) inpn 20 Units nightly.  HumaLOG KwikPen Insulin 200 unit/mL (3 mL) inpn 4 Units Before breakfast, lunch, and dinner.  fluticasone propionate (FLONASE) 50 mcg/actuation nasal spray 2 Sprays by Both Nostrils route daily.  dextroamphetamine-amphetamine (ADDERALL) 20 mg tablet 20 mg two (2) times a day.  cyclobenzaprine (FLEXERIL) 10 mg tablet three (3) times daily as needed.  VENTOLIN HFA 90 mcg/actuation inhaler every six (6) hours as needed.  amLODIPine (NORVASC) 10 mg tablet 10 mg daily.     ADVAIR DISKUS 250-50 mcg/dose diskus inhaler 1 Puff two (2) times a day.  metFORMIN (GLUCOPHAGE) 1,000 mg tablet 1,000 mg two (2) times a day.  metoprolol succinate (TOPROL-XL) 200 mg XL tablet 200 mg daily.  [place] Thyroid 90 mg tablet 90 mg daily. Disposition:  Home     DISCHARGE NOTE:   Pt has been reexamined. Patient has no new complaints, changes, or physical findings. Care plan outlined and precautions discussed. Results of workup were reviewed with the patient. All medications were reviewed with the patient. All of pt's questions and concerns were addressed. Patient was instructed and agrees to follow up with PCP/Ortho as well as to return to the ED upon further deterioration. Patient is ready to go home. Follow-up Information     Follow up With Specialties Details Why Contact Info    [facility] EMERGENCY DEPT Emergency Medicine   [address] [place] [address] [id number] [place] [address], Venancio Britton MD Internal Medicine, Internal Medicine   Antoine Rmoan [address] [place] [address] [address] [place] [address] Orthopedic Surgery Schedule an appointment as soon as possible for a visit  [address] [facility], [address] [place] [address] Way  [phone]          Current Discharge Medication List      START taking these medications    Details   ketorolac (TORADOL) 10 mg tablet Take 1 Tab by mouth every six (6) hours as needed for Pain. Qty: 20 Tab, Refills: 0      acetaminophen (TYLENOL) 325 mg tablet Take 2 Tabs by mouth every four (4) hours as needed for Pain. Qty: 20 Tab, Refills: 0      metaxalone (Skelaxin) 800 mg tablet Take 1 Tab by mouth four (4) times daily for 5 days. Qty: 20 Tab, Refills: 0                 Diagnosis     Clinical Impression:   1. Neck strain, initial encounter    2. Low back strain, initial encounter    3.  Pain and swelling of right lower extremity          \"Please note that this dictation was completed with Radha the computer voice recognition software. Quite often unanticipated grammatical, syntax, homophones, and other interpretive errors are inadvertently transcribed by the computer software. Please disregard these errors. Please excuse any errors that have escaped final proofreading. \"

## 2020-11-20 ENCOUNTER — OFFICE VISIT (OUTPATIENT)
Dept: ORTHOPEDIC SURGERY | Age: 50
End: 2020-11-20
Payer: COMMERCIAL

## 2020-11-20 VITALS
HEIGHT: 64 IN | WEIGHT: 205 LBS | BODY MASS INDEX: 35 KG/M2 | HEART RATE: 91 BPM | SYSTOLIC BLOOD PRESSURE: 137 MMHG | OXYGEN SATURATION: 99 % | DIASTOLIC BLOOD PRESSURE: 83 MMHG | TEMPERATURE: 97 F | RESPIRATION RATE: 16 BRPM

## 2020-11-20 DIAGNOSIS — M47.812 SPONDYLOSIS OF CERVICAL REGION WITHOUT MYELOPATHY OR RADICULOPATHY: ICD-10-CM

## 2020-11-20 DIAGNOSIS — M54.2 NECK PAIN: Primary | ICD-10-CM

## 2020-11-20 DIAGNOSIS — M54.2 CERVICALGIA: ICD-10-CM

## 2020-11-20 PROCEDURE — 72040 X-RAY EXAM NECK SPINE 2-3 VW: CPT | Performed by: PHYSICIAN ASSISTANT

## 2020-11-20 PROCEDURE — 99202 OFFICE O/P NEW SF 15 MIN: CPT | Performed by: PHYSICIAN ASSISTANT

## 2020-11-20 RX ORDER — DICLOFENAC SODIUM 75 MG/1
75 TABLET, DELAYED RELEASE ORAL 2 TIMES DAILY WITH MEALS
Qty: 60 TAB | Refills: 0 | Status: SHIPPED | OUTPATIENT
Start: 2020-11-20 | End: 2020-12-21

## 2020-11-20 RX ORDER — DICLOFENAC SODIUM 10 MG/G
GEL TOPICAL
Qty: 100 G | Refills: 0 | Status: SHIPPED | OUTPATIENT
Start: 2020-11-20

## 2020-11-20 NOTE — PROGRESS NOTES
Patient: Nichol Maria                MRN: [id number]       SSN: [id number]  YOB: 1970        AGE: 47 y.o. SEX: female          PCP: Gurmeet Luna MD  11/20/20    Chief Complaint   Patient presents with    Back Pain     lower back pain    Leg Pain     right leg pain       HISTORY:  Nichol Maria is a 47 y.o. female presents to the office complaining of a 2-1/2-week history of neck and upper left shoulder pain. She was seen to the emergency department locally on 11/19/2020 for pain in those areas. She was provided Toradol and a muscle relaxant. She denies any radicular pain in the upper extremities. She feels like that she was moving her furniture about 2 weeks ago in her home where she has her home office and she may have overexerted her neck and upper back. She is well-known to our office over the past several years and has been seen by Dr. Dante Richards for right knee pain. She has a history of a lateral release while in the [employer]. She has been given cortisone injections to the knees by Dr. Dante Richards in the past.  She has had some swelling in her right knee of recent but no give way.       Pain Assessment  11/20/2020   Location of Pain Leg;Back   Location Modifiers Right   Severity of Pain 5   Quality of Pain Aching   Quality of Pain Comment numbness and tingling   Duration of Pain Persistent   Frequency of Pain Constant   Aggravating Factors (No Data)   Aggravating Factors Comment sitting to long   Limiting Behavior -   Relieving Factors Nothing   Result of Injury No   Work-Related Injury -   Type of Injury -   Type of Injury Comment -           Lab Results   Component Value Date/Time    Hemoglobin A1c 6.4 (H) 08/17/2010 02:20 PM     Weight Metrics 11/20/2020 8/25/2020 8/14/2020 7/30/2020 7/24/2020 7/2/2020 1/27/2020   Weight 205 lb 196 lb 198 lb 199 lb 6 oz 200 lb 205 lb 198 lb   BMI 35.19 kg/m2 33.64 kg/m2 33.99 kg/m2 34.22 kg/m2 34.33 kg/m2 35.19 kg/m2 33.99 kg/m2            Problem List Items Addressed This Visit     None      Visit Diagnoses     Neck pain    -  Primary    Relevant Orders    AMB POC XRAY, SPINE, CERVICAL; 2 OR 3 (Completed)    REFERRAL TO PHYSICAL THERAPY    Spondylosis of cervical region without myelopathy or radiculopathy        Relevant Medications    diclofenac EC (VOLTAREN) 75 mg EC tablet    Other Relevant Orders    REFERRAL TO PHYSICAL THERAPY    Cervicalgia        Relevant Orders    REFERRAL TO PHYSICAL THERAPY          PAST MEDICAL HISTORY:   Past Medical History:   Diagnosis Date    Asthma     Diabetes ([address]     Hypertension     Nausea & vomiting     Pancreatitis     Thyroid disease     hypothyroidism       PAST SURGICAL HISTORY:   Past Surgical History:   Procedure Laterality Date    HX ANKLE FRACTURE TX      HX GYN      BTL hysterectomy    HX KNEE ARTHROSCOPY      right and left       ALLERGIES:   Allergies   Allergen Reactions    Ace Inhibitors Angioedema    Adhesive Hives and Itching    Adhesive Tape-Silicones Hives and Itching    Doxycycline Hives        CURRENT MEDICATIONS:  A list of medications prior to the time of admission include:  Prior to Admission medications    Medication Sig Start Date End Date Taking? Authorizing Provider   diclofenac EC (VOLTAREN) 75 mg EC tablet Take 1 Tab by mouth two (2) times daily (with meals). 11/20/20  Yes Riley Lew PA-C   diclofenac (VOLTAREN) 1 % gel Apply 2 g topically to left paracervical region and left upper back. 11/20/20  Yes Jg Vazquez PA-C   ketorolac (TORADOL) 10 mg tablet Take 1 Tab by mouth every six (6) hours as needed for Pain. 11/19/20  Yes Sadie Vyas PA   acetaminophen (TYLENOL) 325 mg tablet Take 2 Tabs by mouth every four (4) hours as needed for Pain. 11/19/20  Yes Sadie Vyas PA   metaxalone (Skelaxin) 800 mg tablet Take 1 Tab by mouth four (4) times daily for 5 days.  11/19/20 11/24/20 Yes Sadie Vyas PA   hydrOXYzine pamoate (VISTARIL) 25 mg capsule Take 25 mg by mouth three (3) times daily as needed. Yes Provider, Historical   predniSONE (STERAPRED DS) 10 mg dose pack Take  by mouth See Admin Instructions. See administration instruction per 10mg dose pack   Yes Provider, Historical   triamcinolone acetonide (KENALOG) 0.025 % ointment Apply  to affected area two (2) times a day. use thin layer   Yes Provider, Historical   bisacodyL 5 mg tab Take 5 mg by mouth daily. 7/30/20  Yes Braxton Up MD   carisoprodoL (SOMA) 350 mg tablet every eight (8) hours as needed. 5/15/20  Yes Provider, Historical   cholecalciferol (VITAMIN D3) (5000 Units/125 mcg) tab tablet Take 5,000 Units by mouth daily. Yes Provider, Historical   Lantus Solostar U-100 Insulin 100 unit/mL (3 mL) inpn 20 Units nightly. 6/9/20  Yes Provider, Historical   HumaLOG KwikPen Insulin 200 unit/mL (3 mL) inpn 4 Units Before breakfast, lunch, and dinner. 6/9/20  Yes Provider, Historical   fluticasone propionate (FLONASE) 50 mcg/actuation nasal spray 2 Sprays by Both Nostrils route daily. 3/26/20  Yes Provider, Historical   dextroamphetamine-amphetamine (ADDERALL) 20 mg tablet 20 mg two (2) times a day. 5/20/20  Yes Provider, Historical   cyclobenzaprine (FLEXERIL) 10 mg tablet three (3) times daily as needed. 5/15/20  Yes Provider, Historical   VENTOLIN HFA 90 mcg/actuation inhaler every six (6) hours as needed. 8/30/17  Yes Provider, Historical   amLODIPine (NORVASC) 10 mg tablet 10 mg daily. 11/30/17  Yes Provider, Historical   ADVAIR DISKUS 250-50 mcg/dose diskus inhaler 1 Puff two (2) times a day. 8/30/17  Yes Provider, Historical   metFORMIN (GLUCOPHAGE) 1,000 mg tablet 1,000 mg two (2) times a day. 8/30/17  Yes Provider, Historical   metoprolol succinate (TOPROL-XL) 200 mg XL tablet 200 mg daily. 10/18/17  Yes Provider, Historical   [place] Thyroid 90 mg tablet 90 mg daily.  11/15/17  Yes Provider, Historical   ibuprofen (MOTRIN) 800 mg tablet Take 1 Tab by mouth three (3) times daily as needed for Pain. 7/30/20   Jasmyne Fleming MD       FAMILY HISTORY:   Family History   Problem Relation Age of Onset    Breast Cancer Other 27    Breast Cancer Maternal Aunt 61    Ovarian Cancer Maternal Grandmother 36    Hypertension Mother     Diabetes Mother     Hypertension Father     Diabetes Father        SOCIAL HISTORY:   Social History     Socioeconomic History    Marital status:      Spouse name: Not on file    Number of children: Not on file    Years of education: Not on file    Highest education level: Not on file   Tobacco Use    Smoking status: Never Smoker    Smokeless tobacco: Never Used   Substance and Sexual Activity    Alcohol use: Yes     Frequency: Monthly or less     Comment: occasionally    Drug use: No       ROS:No CP, No SOB, No fever/chills nor night sweats. No headaches, vision abnormalities to include double and oral loss of vision. No hearing abnormalities. Musculoskeletal pain per HPI. Pain is exacerbated positionally. Pt denies h/o spinal surgery, injections, or PT/chiropractor. Self treated with less than adequate relief on oral antiinflammatories. . Pt denies change in bowel or bladder habits. Pt denies fever, weight loss, or skin changes. EXAM:  Patient alert and oriented x 3,   CN II-XII grossly intact  Sitting comfortably in the exam room, interacting with conversation with pleasant affect. Breathing appears regular effortless with no visible usage of accessory muscles  Distal cap refill intact at 2/2 Adam UE / LE. Neuro intact Adam UE/LE to noxious stimuli    Ortho Specific exam:    Midline cervical spine reveals no lesions mass or step-offs. There is pain associated with direct palpation over the lower cervical spine consistent with levels C5-C6 and C7. There is moderate tenderness in the Cervical musculature slightly greater on the left than the right.   There is pain radiating into the left upper trapezius/rhomboid region. Patient on range of motion testing of the cervical spine reveals chin to chest with no deficit. She has no pain through ranging. Her cervical extension actively 20 degrees with some slight discomfort appreciated in the left paracervical musculature. Lateral rotation to the left 20 degrees into the right 45 degrees. There is a negative Lhermitte sign negative Spurling test.    Bilateral upper extremities reveal DTRs brachial radialis 1/2 symmetrically biceps 1+/2 symmetrically and triceps 1/2 symmetrically.  strength is intact bilateral upper extremities at 5-/5. Distal sensation intact fully bilateral upper extremities. [facility] 11/20/2020: 2 view of the cervical spine reveals straightening of the upper cervical spine from C2-C4. There is multiple levels of degenerative joint disease throughout the entire cervical complex. There is some sloping with kissing osteophytes seen anteriorly at C4-C7. No evidence of spondylolisthesis. Multiple levels of spondylosis noted. IMPRESSION:      ICD-10-CM ICD-9-CM    1. Neck pain  M54.2 723.1 AMB POC XRAY, SPINE, CERVICAL; 2 OR 3      REFERRAL TO PHYSICAL THERAPY   2. Spondylosis of cervical region without myelopathy or radiculopathy  M47.812 721.0 REFERRAL TO PHYSICAL THERAPY   3. Cervicalgia  M54.2 723.1 REFERRAL TO PHYSICAL THERAPY        PLAN: Today we discussed alternatives to treatment to include but not limited to physical therapy to associate improve range of motion and decrease pain. She was given Toradol in the emergency room which I am asked her to discontinue and instead I am placing her on Voltaren enteric-coated tablet 75 mg p.o. twice daily. She may also try Voltaren topical gel 1% 2 g to the affected left upper back and neck area up to 4 times a day. Physical therapy will be started for range of motion all modalities to include cervical traction.   Patient is got a follow back in our office within about 2 to 3 weeks. Today all of her questions answered to her satisfaction. Copy of her x-rays reviewed and provided. Patient provided a reminder for a \"due or due soon\" health maintenance. I have asked the patient to schedule an appointment with their primary care provider for follow-up on general health maintenance concerns. Today all the patient's questions were answered to their satisfaction. Copies of x-rays reviewed if obtained this visit, and provided to patient. Dictation disclaimer:  Please note that this dictation was completed with [employer], the [employer] voice recognition software. Quite often unanticipated grammatical, syntax, homophones, and other interpretive errors are inadvertently transcribed by the computer software. Please disregard these errors. Please excuse any errors that have escaped final proofreading. Yordan HINES, APC, MPAS, PA-C  [facility]

## 2020-12-07 ENCOUNTER — HOSPITAL ENCOUNTER (OUTPATIENT)
Dept: PHYSICAL THERAPY | Age: 50
Discharge: HOME | End: 2020-12-07
Payer: COMMERCIAL

## 2020-12-07 PROCEDURE — 97162 PT EVAL MOD COMPLEX 30 MIN: CPT

## 2020-12-07 PROCEDURE — 97112 NEUROMUSCULAR REEDUCATION: CPT

## 2020-12-07 PROCEDURE — 97530 THERAPEUTIC ACTIVITIES: CPT

## 2020-12-07 NOTE — PROGRESS NOTES
[facility] - [place] [address]  [address], [address] [phone] fax    Plan of Care/ Statement of Necessity for Physical Therapy Services           Patient name: Jamie Rob Start of Care: 2020   Referral source: Bebe Chua : 1970    Medical Diagnosis: Neck pain [M54.2]  Payor: Swathi Mail / Plan: [place] [employer] / Product Type: PPO /  Onset Date:2020    Treatment Diagnosis: neck pain   Prior Hospitalization: see medical history Provider#: [id number]   Medications: Verified on Patient summary List    Comorbidities: history of LBP   Prior Level of Function: functionally (I); works sedentary job at [employer] and following information is based on the information from the initial evaluation. Assessment/ key information:   Ms. Emiliano Washington is a 51yo female who presents to PT with signs and symptoms consistent with cervical radiculopathy. Pt demonstrates positive left spurlings, numbness/tingling in the C6-C7 distribution, decreased elbow extension, wrist flexion, and  strength, and relief with cervical distraction. Pt deficits impair her ability to perform ADLs at [facility]. Pt will benefit from skilled PT in order to address listed deficits, decrease pain, and maximize functional potential.     Evaluation Complexity History HIGH Complexity :3+ comorbidities / personal factors will impact the outcome/ POC ; Examination MEDIUM Complexity : 3 Standardized tests and measures addressing body structure, function, activity limitation and / or participation in recreation  ;Presentation MEDIUM Complexity : Evolving with changing characteristics  ; Clinical Decision Making MEDIUM Complexity : FOTO score of 26-74  Overall Complexity Rating: MEDIUM  Problem List: pain affecting function, decrease ROM, decrease strength, decrease ADL/ functional abilitiies, decrease activity tolerance and decrease flexibility/ joint mobility   Treatment Plan may include any combination of the following: Therapeutic exercise, Therapeutic activities, Neuromuscular re-education, Physical agent/modality, Gait/balance training, Manual therapy, Aquatic therapy, Patient education, Self Care training and Functional mobility training  Patient / Family readiness to learn indicated by: asking questions, trying to perform skills and interest  Persons(s) to be included in education: patient (P)  Barriers to Learning/Limitations: None  Patient Goal (s): ride my motorcycle  Patient Self Reported Health Status: fair  Rehabilitation Potential: good  Short Term Goals: To be accomplished in 2 weeks:  1. Pt will report compliance HEP in order to supplement PT treatment   Eval = established  2. Pt will report max pain 7/10 in order to improve ability to sleep at night   Eval = 10/10    Long Term Goals: To be accomplished in 9 treatments:  1. Pt will score at least 68 on FOTO in order to facilitate return to PLOF. Eval = 55  2. Pt will demonstrate bilateral lower trap strength to at least 4/5 in order reduce pain with ADLs   Eval = bilaterally 3/5  3. Pt will demonstrate left elbow extension and wrist flexion strength to a minimum 5-/5 in order to improve ability to perform heavy ADLs. Eval = 4/5 into both planes  4. Pt will demonstrate left  strength to at least 72lbs in order to prevent dropping objects while working    Eval = 58lbs    Frequency / Duration: Patient to be seen 2 times per week for 9 treatments. Patient/ Caregiver education and instruction: Diagnosis, prognosis, self care, activity modification and exercises   [x]  Plan of care has been reviewed with JORDANA Cheema, PT 12/7/2020 8:55 AM    ________________________________________________________________________    I certify that the above Therapy Services are being furnished while the patient is under my care.  I agree with the treatment plan and certify that this therapy is necessary.     Physician's Signature:____________Date:_________TIME:________    ** Signature, Date and Time must be completed for valid certification **    Please sign and return to [facility] [address]  [address] [place], [address] [phone] fax

## 2020-12-07 NOTE — PROGRESS NOTES
PT DAILY TREATMENT NOTE 11    Patient Name: Nicole Dunbar  Date:2020  : 1970  [x]  Patient  Verified  Payor: Kyle Casillas / [place]: [place] [employer] / Product Type: PPO /    In time:904  Out time:950  Total Treatment Time (min): 56  Visit #: 1 of 9    Treatment Area: Neck pain [M54.2]    SUBJECTIVE  Pain Level (0-10 scale): 4  Any medication changes, allergies to medications, adverse drug reactions, diagnosis change, or new procedure performed?: [x] No    [] Yes (see summary sheet for update)  Subjective functional status/changes:   [] No changes reported  See evaluation     OBJECTIVE    25 min [x]Eval                  []Re-Eval     8 min Therapeutic Activity:  [x]  See flow sheet : Pt edu on centralization vs peripheralization, PT POC   Rationale: education  to improve the patients ability to understand PT POC     23 min Neuromuscular Re-education:  [x]  See flow sheet : explanation, demonstration, performance and distribution of HEP   Rationale: increase strength, improve coordination and increase proprioception  to improve the patients ability to tolerate sustained postures          With   [] TE   [] TA   [] neuro   [] other: Patient Education: [x] Review HEP    [] Progressed/Changed HEP based on:   [] positioning   [] body mechanics   [] transfers   [] heat/ice application    [] other:      Other Objective/Functional Measures: see evaluation      Pain Level (0-10 scale) post treatment: 2    ASSESSMENT/Changes in Function: see POC    Patient will continue to benefit from skilled PT services to modify and progress therapeutic interventions, address functional mobility deficits, address ROM deficits, address strength deficits, analyze and address soft tissue restrictions, analyze and cue movement patterns, analyze and modify body mechanics/ergonomics, assess and modify postural abnormalities, address imbalance/dizziness and instruct in home and community integration to attain remaining goals.      [x] See Plan of Care  []  See progress note/recertification  []  See Discharge Summary         Progress towards goals / Updated goals:  Short Term Goals: To be accomplished in 2 weeks:  1. Pt will report compliance HEP in order to supplement PT treatment   Eval = established  2. Pt will report max pain 7/10 in order to improve ability to sleep at night   Eval = 10/10    Long Term Goals: To be accomplished in 9 treatments:  1. Pt will score at least 68 on FOTO in order to facilitate return to PLOF. Eval = 55  2. Pt will demonstrate bilateral lower trap strength to at least 4/5 in order reduce pain with ADLs   Eval = bilaterally 3/5  3. Pt will demonstrate left elbow extension and wrist flexion strength to a minimum 5-/5 in order to improve ability to perform heavy ADLs. Eval = 4/5 into both planes  4.    Pt will demonstrate left  strength to at least 72lbs in order to prevent dropping objects while working    Eval = 58lbs      PLAN  [x]  Upgrade activities as tolerated     [x]  Continue plan of care  []  Update interventions per flow sheet       []  Discharge due to:_  []  Other:_      Masood Freeman, PT 12/7/2020  8:55 AM    Future Appointments   Date Time Provider Libia Medel   12/7/2020  9:00 AM Vicente Ulloa, PT [facility] [address]   12/8/2020  8:30 AM Yo Vazquez PA-C [facility] AMB

## 2020-12-08 ENCOUNTER — OFFICE VISIT (OUTPATIENT)
Dept: ORTHOPEDIC SURGERY | Age: 50
End: 2020-12-08
Payer: COMMERCIAL

## 2020-12-08 ENCOUNTER — TELEPHONE (OUTPATIENT)
Dept: ORTHOPEDIC SURGERY | Age: 50
End: 2020-12-08

## 2020-12-08 VITALS
RESPIRATION RATE: 16 BRPM | OXYGEN SATURATION: 98 % | WEIGHT: 205.6 LBS | BODY MASS INDEX: 35.1 KG/M2 | SYSTOLIC BLOOD PRESSURE: 136 MMHG | TEMPERATURE: 97.1 F | HEART RATE: 95 BPM | HEIGHT: 64 IN | DIASTOLIC BLOOD PRESSURE: 83 MMHG

## 2020-12-08 DIAGNOSIS — G89.29 CHRONIC BILATERAL LOW BACK PAIN WITHOUT SCIATICA: Primary | ICD-10-CM

## 2020-12-08 DIAGNOSIS — M43.10 RETROLISTHESIS OF VERTEBRAE: ICD-10-CM

## 2020-12-08 DIAGNOSIS — M54.50 CHRONIC BILATERAL LOW BACK PAIN WITHOUT SCIATICA: ICD-10-CM

## 2020-12-08 DIAGNOSIS — M47.816 LUMBAR FACET ARTHROPATHY: ICD-10-CM

## 2020-12-08 DIAGNOSIS — M54.50 CHRONIC BILATERAL LOW BACK PAIN WITHOUT SCIATICA: Primary | ICD-10-CM

## 2020-12-08 DIAGNOSIS — G89.29 CHRONIC BILATERAL LOW BACK PAIN WITHOUT SCIATICA: ICD-10-CM

## 2020-12-08 PROCEDURE — 72100 X-RAY EXAM L-S SPINE 2/3 VWS: CPT | Performed by: PHYSICIAN ASSISTANT

## 2020-12-08 PROCEDURE — 99213 OFFICE O/P EST LOW 20 MIN: CPT | Performed by: PHYSICIAN ASSISTANT

## 2020-12-08 NOTE — TELEPHONE ENCOUNTER
Patient [phone]    Patient would like to clarify out of work dates that are on the leave paperwork.   She would like a return call from clinical.

## 2020-12-08 NOTE — PROGRESS NOTES
Patient: Duong Munoz                MRN: [id number]       SSN: [id number]  YOB: 1970        AGE: 47 y.o. SEX: female          PCP: Saurabh Davila MD  12/08/20    Chief Complaint   Patient presents with    Back Pain     back pain       HISTORY:  Duong Munoz is a 47 y.o. female returns to the office complaining of recurrence of low back and left leg pain. She has a history of a Workmen's Comp. related accident of her low back several years ago while moving a patient. She has gone through treatment outpatient for low back discomfort which included muscle relaxants, epidural steroid injections and physical therapy. She denies any bowel or bladder incontinence today. However she does report intermittent chronic constipation. She does not recall a digital rectal exam.  She does report radicular pain in the left leg anterior to just below the left knee. No history of recent trauma. She says that she experiences episodes acute of her chronic low back pain every several months to yearly. She is currently working with [employer] as a nurse . She has a distant history of an MRI of the lumbar spine but is uncertain of the exact year noting it was probably 2015/16. Pain Assessment  12/8/2020   Location of Pain Back   Location Modifiers -   Severity of Pain 6   Quality of Pain Aching   Quality of Pain Comment -   Duration of Pain Persistent   Frequency of Pain Constant   Aggravating Factors Standing   Aggravating Factors Comment pro long sitting   Limiting Behavior No   Relieving Factors Exercises; Heat   Result of Injury No   Work-Related Injury -   Type of Injury -   Type of Injury Comment -           Lab Results   Component Value Date/Time    Hemoglobin A1c 6.4 (H) 08/17/2010 02:20 PM     Weight Metrics 12/8/2020 11/20/2020 8/25/2020 8/14/2020 7/30/2020 7/24/2020 7/2/2020   Weight 205 lb 9.6 oz 205 lb 196 lb 198 lb 199 lb 6 oz 200 lb 205 lb BMI 35.29 kg/m2 35.19 kg/m2 33.64 kg/m2 33.99 kg/m2 34.22 kg/m2 34.33 kg/m2 35.19 kg/m2            Problem List Items Addressed This Visit     None      Visit Diagnoses     Chronic bilateral low back pain without sciatica    -  Primary    Relevant Orders    AMB POC XRAY, SPINE, LUMBOSACRAL; 2 O (Completed)          PAST MEDICAL HISTORY:   Past Medical History:   Diagnosis Date    Asthma     Diabetes ([address]     Hypertension     Nausea & vomiting     Pancreatitis     Thyroid disease     hypothyroidism       PAST SURGICAL HISTORY:   Past Surgical History:   Procedure Laterality Date    HX ANKLE FRACTURE TX      HX GYN      BTL hysterectomy    HX KNEE ARTHROSCOPY      right and left       ALLERGIES:   Allergies   Allergen Reactions    Ace Inhibitors Angioedema    Adhesive Hives and Itching    Adhesive Tape-Silicones Hives and Itching    Doxycycline Hives        CURRENT MEDICATIONS:  A list of medications prior to the time of admission include:  Prior to Admission medications    Medication Sig Start Date End Date Taking? Authorizing Provider   diclofenac EC (VOLTAREN) 75 mg EC tablet Take 1 Tab by mouth two (2) times daily (with meals). 11/20/20  Yes Mango Adams PA-C   diclofenac (VOLTAREN) 1 % gel Apply 2 g topically to left paracervical region and left upper back. 11/20/20  Yes Jg Vazquez PA-C   ketorolac (TORADOL) 10 mg tablet Take 1 Tab by mouth every six (6) hours as needed for Pain. 11/19/20  Yes Suzan Vyas PA   acetaminophen (TYLENOL) 325 mg tablet Take 2 Tabs by mouth every four (4) hours as needed for Pain. 11/19/20  Yes Suzan Vyas PA   hydrOXYzine pamoate (VISTARIL) 25 mg capsule Take 25 mg by mouth three (3) times daily as needed. Yes Provider, Historical   predniSONE (STERAPRED DS) 10 mg dose pack Take  by mouth See Admin Instructions.  See administration instruction per 10mg dose pack   Yes Provider, Historical   triamcinolone acetonide (KENALOG) 0.025 % ointment Apply  to affected area two (2) times a day. use thin layer   Yes Provider, Historical   bisacodyL 5 mg tab Take 5 mg by mouth daily. 7/30/20  Yes Jesus Gonzalez MD   ibuprofen (MOTRIN) 800 mg tablet Take 1 Tab by mouth three (3) times daily as needed for Pain. 7/30/20  Yes Jesus Gonzalez MD   carisoprodoL (SOMA) 350 mg tablet every eight (8) hours as needed. 5/15/20  Yes Provider, Historical   cholecalciferol (VITAMIN D3) (5000 Units/125 mcg) tab tablet Take 5,000 Units by mouth daily. Yes Provider, Historical   Lantus Solostar U-100 Insulin 100 unit/mL (3 mL) inpn 20 Units nightly. 6/9/20  Yes Provider, Historical   HumaLOG KwikPen Insulin 200 unit/mL (3 mL) inpn 4 Units Before breakfast, lunch, and dinner. 6/9/20  Yes Provider, Historical   fluticasone propionate (FLONASE) 50 mcg/actuation nasal spray 2 Sprays by Both Nostrils route daily. 3/26/20  Yes Provider, Historical   dextroamphetamine-amphetamine (ADDERALL) 20 mg tablet 20 mg two (2) times a day. 5/20/20  Yes Provider, Historical   cyclobenzaprine (FLEXERIL) 10 mg tablet three (3) times daily as needed. 5/15/20  Yes Provider, Historical   VENTOLIN HFA 90 mcg/actuation inhaler every six (6) hours as needed. 8/30/17  Yes Provider, Historical   amLODIPine (NORVASC) 10 mg tablet 10 mg daily. 11/30/17  Yes Provider, Historical   ADVAIR DISKUS 250-50 mcg/dose diskus inhaler 1 Puff two (2) times a day. 8/30/17  Yes Provider, Historical   metFORMIN (GLUCOPHAGE) 1,000 mg tablet 1,000 mg two (2) times a day. 8/30/17  Yes Provider, Historical   metoprolol succinate (TOPROL-XL) 200 mg XL tablet 200 mg daily. 10/18/17  Yes Provider, Historical   [place] Thyroid 90 mg tablet 90 mg daily.  11/15/17  Yes Provider, Historical       FAMILY HISTORY:   Family History   Problem Relation Age of Onset    Breast Cancer Other 27    Breast Cancer Maternal Aunt 61    Ovarian Cancer Maternal Grandmother 36    Hypertension Mother     Diabetes Mother     Hypertension Father     Diabetes Father        SOCIAL HISTORY:   Social History     Socioeconomic History    Marital status:      Spouse name: Not on file    Number of children: Not on file    Years of education: Not on file    Highest education level: Not on file   Tobacco Use    Smoking status: Never Smoker    Smokeless tobacco: Never Used   Substance and Sexual Activity    Alcohol use: Yes     Frequency: Monthly or less     Comment: occasionally    Drug use: No       ROS:No CP, No SOB, No fever/chills nor night sweats. No headaches, vision abnormalities to include double and oral loss of vision. No hearing abnormalities. Musculoskeletal pain per HPI. Pain is exacerbated positionally. Pt denies h/o spinal surgery, injections, or PT/chiropractor. Self treated with less than adequate relief on oral antiinflammatories. . Pt denies change in bowel or bladder habits. Pt denies fever, weight loss, or skin changes. EXAM:  Patient alert and oriented x 3,   CN II-XII grossly intact  Sitting comfortably in the exam room, interacting with conversation with pleasant affect. Breathing appears regular effortless with no visible usage of accessory muscles  Distal cap refill intact at 2/2 Adam UE / LE. Neuro intact Adam UE/LE to noxious stimuli    Ortho Specific exam:    Examination of the lower extremities reveal skin intact. She has absent DTRs symmetrically of the patella and trace symmetrically of the Achilles. She has a negative figure 4 sign on the right as well as a negative straight leg raise. Left leg straight leg raise is positive at 20 degrees and there is also a positive figure 4 sign. Patient has weakness in her quad hamstrings on the left when compared to the right noted today at 4+/5. No midline lumbar spinal deformities lesions or masses or step-offs appreciated. Diffuse tenderness in the left iliolumbar region consistent associated to the left SI joint.     Sensory exam bilateral lower extremities in comparison reveals decreased sensation along the L3-L4 dermatome left lower extremity to light touch. Chaperone present Suleiman Davila RTR through exam.    X-ray: Fawn [facility] 12/8/2020 space 2 view of the lumbar spine reveals multiple levels of spondylosis throughout the entire lumbar complex. There is a retrolisthesis of L5 on S1 noted grade 1. There is moderate facet arthropathy also noted L3-L4 L4-L5 and L5-S1. IMPRESSION:      ICD-10-CM ICD-9-CM    1. Chronic bilateral low back pain without sciatica  M54.5 724.2 AMB POC XRAY, SPINE, LUMBOSACRAL; 2 O    G89.29 338.29         PLAN: At this point patient has an exacerbation of low back pain that obviously related to her prior injury. Without MRI findings of recent and concerning the x-ray noting the foraminal stenosis is facet arthropathy and the retrolisthesis will go ahead and hold on physical therapy and order an MRI of the lumbar spine. Patient was counseled on the importance of bowel and bladder incontinence. Today her x-rays reviewed all of her questions answered to her satisfaction. Patient provided a reminder for a \"due or due soon\" health maintenance. I have asked the patient to schedule an appointment with their primary care provider for follow-up on general health maintenance concerns. Today all the patient's questions were answered to their satisfaction. Copies of x-rays reviewed if obtained this visit, and provided to patient. Dictation disclaimer:  Please note that this dictation was completed with [employer], the computer voice recognition software. Quite often unanticipated grammatical, syntax, homophones, and other interpretive errors are inadvertently transcribed by the computer software. Please disregard these errors. Please excuse any errors that have escaped final proofreading. Rubia HINES, APC, [address] [facility], [address] [place] [address] [place]

## 2020-12-09 NOTE — TELEPHONE ENCOUNTER
Yes patient was taken out of work from 12/8/2020 to 3/8/2021. Separate note is appropriate and authorized if necessary.

## 2020-12-09 NOTE — TELEPHONE ENCOUNTER
Pt wanted to verify that she is going to be out of work as of yesterday until date put on the paperwork 3/8/21. She said on paperwork continuous was marked, and she wanted to be sure what that meant. Pt stated she is in a lot of pain and would like to be out of work until she finds out what's going on. She also wants a separate out of work note for her local manager with less details on it.

## 2020-12-10 ENCOUNTER — HOSPITAL ENCOUNTER (OUTPATIENT)
Dept: PHYSICAL THERAPY | Age: 50
Discharge: HOME | End: 2020-12-10
Payer: COMMERCIAL

## 2020-12-10 PROCEDURE — 97112 NEUROMUSCULAR REEDUCATION: CPT

## 2020-12-10 PROCEDURE — 97110 THERAPEUTIC EXERCISES: CPT

## 2020-12-10 PROCEDURE — 97530 THERAPEUTIC ACTIVITIES: CPT

## 2020-12-10 PROCEDURE — 97012 MECHANICAL TRACTION THERAPY: CPT

## 2020-12-10 NOTE — PROGRESS NOTES
PT DAILY TREATMENT NOTE 1120    Patient Name: Avery Churchill  Date:12/10/2020  : 1970  [x]  Patient  Verified  Payor: [place]  / Plan: [place] [employer] / Product Type: PPO /    In time:9:43  Out time:10:45  Total Treatment Time (min): 62  Visit #: 2 of 9    Treatment Area: Neck pain [M54.2]    SUBJECTIVE  Pain Level (0-10 scale): 5  Any medication changes, allergies to medications, adverse drug reactions, diagnosis change, or new procedure performed?: [x] No    [] Yes (see summary sheet for update)  Subjective functional status/changes:   [] No changes reported  \"I have a little bit of tingling in my hand. \"    OBJECTIVE    Modality rationale: decrease pain to improve the patients ability to reduce radicular symptoms    Min Type Additional Details    [] Estim:  []Unatt       []IFC  []Premod                        []Other:  []w/ice   []w/heat  Position:  Location:    [] Estim: []Att    []TENS instruct  []NMES                    []Other:  []w/US   []w/ice   []w/heat  Position:  Location:   10  With heat [x]  Traction: [x] Cervical       []Lumbar                       [] Prone          [x]Supine                       [x]Intermittent   []Continuous Lbs: 15/8  [] before manual  [] after manual    []  Ultrasound: []Continuous   [] Pulsed                           []1MHz   []3MHz W/cm2:  Location:    []  Iontophoresis with dexamethasone         Location: [] Take home patch   [] In clinic    []  Ice     []  heat  []  Ice massage  []  Laser   []  Anodyne Position:  Location:    []  Laser with stim  []  Other:  Position:  Location:    []  Vasopneumatic Device Pressure:       [] lo [] med [] hi   Temperature: [] lo [] med [] hi   [] Skin assessment post-treatment:  []intact []redness- no adverse reaction    []redness - adverse reaction:       10 min Therapeutic Exercise:  [x] See flow sheet :   Rationale: increase ROM and increase strength to improve the patients ability to perform ADLs    18 min Therapeutic Activity: [x]  See flow sheet : gripping   Rationale: increase strength, improve coordination, improve balance and increase proprioception  to improve the patients ability to improve  strength and improve functional activities      24 min Neuromuscular Re-education:  [x]  See flow sheet : posture   Rationale: improve coordination, improve balance and increase proprioception  to improve the patients ability to maintain posture             With   [x] TE   [x] TA   [x] neuro   [] other: Patient Education: [x] Review HEP    [] Progressed/Changed HEP based on:   [] positioning   [] body mechanics   [] transfers   [] heat/ice application    [] other:      Other Objective/Functional Measures:      Pain Level (0-10 scale) post treatment: 0    ASSESSMENT/Changes in Function: Ms. Patrick Saab responded well to treatment and performed exercises well. She had slight radicular symptoms and some pain in her neck with supine cervical retraction, but had relief after cervical traction. Patient will continue to benefit from skilled PT services to modify and progress therapeutic interventions, address functional mobility deficits, address ROM deficits, address strength deficits, analyze and address soft tissue restrictions, analyze and cue movement patterns, analyze and modify body mechanics/ergonomics, assess and modify postural abnormalities, address imbalance/dizziness and instruct in home and community integration to attain remaining goals. [x]  See Plan of Care  []  See progress note/recertification  []  See Discharge Summary         Progress towards goals / Updated goals:  Short Term Goals: To be accomplished in 2 weeks:  1. Pt will report compliance HEP in order to supplement PT treatment              Eval = established  2. Pt will report max pain 7/10 in order to improve ability to sleep at night              Eval = 10/10     Long Term Goals: To be accomplished in 9 treatments:  1.    Pt will score at least 68 on FOTO in order to facilitate return to PLOF. Eval = 55  2. Pt will demonstrate bilateral lower trap strength to at least 4/5 in order reduce pain with ADLs              Eval = bilaterally 3/5  3. Pt will demonstrate left elbow extension and wrist flexion strength to a minimum 5-/5 in order to improve ability to perform heavy ADLs. Eval = 4/5 into both planes  4.    Pt will demonstrate left  strength to at least 72lbs in order to prevent dropping objects while working               Eval = 58lbs    PLAN  []  Upgrade activities as tolerated     [x]  Continue plan of care  []  Update interventions per flow sheet       []  Discharge due to:_  []  Other:_      Antonio Graces, [address] [facility] 12/10/2020  10:50 AM    Future Appointments   Date Time Provider Libia Medel   12/15/2020  8:15 AM [facility] MRI RM 1 [facility]   12/15/2020  3:00 PM Valerie Yadav, PT [facility]   12/17/2020  8:15 AM Hugo Villalba, PT [facility]   12/22/2020 11:15 AM [facility] PT [facility] [address] [facility]   12/24/2020  8:15 AM Valerie Yadav, PT [facility]   12/29/2020  8:15 AM Valerie Yadav, PT [facility]   12/31/2020  8:15 AM Vivi Moffett, PT [facility]

## 2020-12-15 ENCOUNTER — HOSPITAL ENCOUNTER (OUTPATIENT)
Dept: PHYSICAL THERAPY | Age: 50
Discharge: HOME | End: 2020-12-15
Payer: COMMERCIAL

## 2020-12-15 ENCOUNTER — HOSPITAL ENCOUNTER (OUTPATIENT)
Dept: MRI IMAGING | Age: 50
Discharge: HOME | End: 2020-12-15
Attending: PHYSICIAN ASSISTANT
Payer: COMMERCIAL

## 2020-12-15 PROCEDURE — 97112 NEUROMUSCULAR REEDUCATION: CPT

## 2020-12-15 PROCEDURE — 72148 MRI LUMBAR SPINE W/O DYE: CPT

## 2020-12-15 PROCEDURE — 97110 THERAPEUTIC EXERCISES: CPT

## 2020-12-15 PROCEDURE — 97012 MECHANICAL TRACTION THERAPY: CPT

## 2020-12-15 NOTE — PROGRESS NOTES
PT DAILY TREATMENT NOTE     Patient Name: Ray Wise  Date:12/15/2020  : 1970  [x]  Patient  Verified  Payor: Miguel Landers / [place]: [place] / Product Type: PPO /    In time:256  Out time:358  Total Treatment Time (min): 62  Visit #: 3 of 9    Treatment Area: Neck pain [M54.2]    SUBJECTIVE  Pain Level (0-10 scale): 3 neck, 8 back  Any medication changes, allergies to medications, adverse drug reactions, diagnosis change, or new procedure performed?: [x] No    [] Yes (see summary sheet for update)  Subjective functional status/changes:   [] No changes reported  Pt reports she felt a bit better after last visit.  Had her back MRI today and looked on mycharts \"it doesn't look good\"    OBJECTIVE    Modality rationale: decrease pain to improve the patients ability to tolerate sustained postures   Min Type Additional Details    [] Estim:  []Unatt       []IFC  []Premod                        []Other:  []w/ice   []w/heat  Position:  Location:    [] Estim: []Att    []TENS instruct  []NMES                    []Other:  []w/US   []w/ice   []w/heat  Position:  Location:   10 [x]  Traction: [x] Cervical       []Lumbar                       [] Prone          []Supine                       []Intermittent   []Continuous Lbs: 18/9lbs  [] before manual  [] after manual    []  Ultrasound: []Continuous   [] Pulsed                           []1MHz   []3MHz W/cm2:  Location:    []  Iontophoresis with dexamethasone         Location: [] Take home patch   [] In clinic    []  Ice     []  heat  []  Ice massage  []  Laser   []  Anodyne Position:  Location:    []  Laser with stim  []  Other:  Position:  Location:    []  Vasopneumatic Device Pressure:       [] lo [] med [] hi   Temperature: [] lo [] med [] hi   [] Skin assessment post-treatment:  [x]intact []redness- no adverse reaction    []redness - adverse reaction:     23 min Therapeutic Exercise:  [x] See flow sheet :   Rationale: increase ROM and increase strength to improve the patients ability to perform ADLs    29 min Neuromuscular Re-education:  [x]  See flow sheet : cervical and scapular stabilization    Rationale: increase strength, improve coordination and increase proprioception  to improve the patients ability to tolerate sustained postures          With   [] TE   [] TA   [] neuro   [] other: Patient Education: [x] Review HEP    [] Progressed/Changed HEP based on:   [] positioning   [] body mechanics   [] transfers   [] heat/ice application    [] other:      Other Objective/Functional Measures: progressed exercises per flow sheet      Pain Level (0-10 scale) post treatment: 1-2    ASSESSMENT/Changes in Function: Pt tolerated progression of exercises with out increase in symptoms. Pt educated to perform prone exercises over pillow due to new MRI findings of retrolisthesis in lumbar spine. Pt continues to respond positively to traction with reduction of pain. Patient will continue to benefit from skilled PT services to modify and progress therapeutic interventions, address functional mobility deficits, address ROM deficits, address strength deficits, analyze and address soft tissue restrictions, analyze and cue movement patterns, analyze and modify body mechanics/ergonomics, assess and modify postural abnormalities and instruct in home and community integration to attain remaining goals. []  See Plan of Care  []  See progress note/recertification  []  See Discharge Summary         Progress towards goals / Updated goals:  Short Term Goals: To be accomplished in 2 weeks:   1.   Pt will report compliance HEP in order to supplement PT treatment              Eval = established   Reports compliance  2.   Pt will report max pain 7/10 in order to improve ability to sleep at night              Eval = 10/10   Appears to be declining 3/10 upon arrival      Long Term Goals: To be accomplished in 9 treatments:  1.   Pt will score at least 68 on FOTO in order to facilitate return to PLOF.             Eval = 55   To be reassessed at end of POC  2.   Pt will demonstrate bilateral lower trap strength to at least 4/5 in order reduce pain with ADLs              Eval = bilaterally 3/5   Tolerating progression of exercises  3.   Pt will demonstrate left elbow extension and wrist flexion strength to a minimum 5-/5 in order to improve ability to perform heavy ADLs.               Eval = 4/5 into both planes    Tolerating progression of exercises  4.   Pt will demonstrate left  strength to at least 72lbs in order to prevent dropping objects while working   [employer] = 58lbs   Tolerating progression of exercises    PLAN  [x]  Upgrade activities as tolerated     [x]  Continue plan of care  []  Update interventions per flow sheet       []  Discharge due to:_  []  Other:_      Joe Espino PT 12/15/2020  2:59 PM    Future Appointments   Date Time Provider Libia Medel   12/15/2020  3:00 PM Geraldine Menjivar [facility]   12/17/2020  8:15 AM Mika Dillard, PT [facility]   12/22/2020 11:15 AM London Cho [facility]   12/24/2020  8:15 AM Yumiko Hardin, PT [facility]   12/29/2020  8:15 AM Yumiko Hardin, PT [facility]   12/31/2020  8:15 AM Mika Dillard PT [facility]

## 2020-12-21 RX ORDER — DICLOFENAC SODIUM 75 MG/1
TABLET, DELAYED RELEASE ORAL
Qty: 60 TAB | Refills: 0 | Status: SHIPPED | OUTPATIENT
Start: 2020-12-21 | End: 2021-01-18

## 2020-12-22 ENCOUNTER — HOSPITAL ENCOUNTER (OUTPATIENT)
Dept: PHYSICAL THERAPY | Age: 50
Discharge: HOME | End: 2020-12-22
Payer: COMMERCIAL

## 2020-12-22 PROCEDURE — 97012 MECHANICAL TRACTION THERAPY: CPT

## 2020-12-22 PROCEDURE — 97112 NEUROMUSCULAR REEDUCATION: CPT

## 2020-12-22 PROCEDURE — 97110 THERAPEUTIC EXERCISES: CPT

## 2020-12-22 NOTE — PROGRESS NOTES
PT DAILY TREATMENT NOTE     Patient Name: Sandrine Garcia  Date:2020  : 1970  [x]  Patient  Verified  Payor: Baltazar Ochoa / Plan: [place] [employer] / Product Type: PPO /    In time:11:16  Out time:12:08  Total Treatment Time (min): 52  Visit #: 4 of 9    Treatment Area: Neck pain [M54.2]    SUBJECTIVE  Pain Level (0-10 scale): 2 neck, 8 back  Any medication changes, allergies to medications, adverse drug reactions, diagnosis change, or new procedure performed?: [x] No    [] Yes (see summary sheet for update)  Subjective functional status/changes:   [] No changes reported   Pt states neck is feeling a little better, still gets tight when she cooks, left hand still feels weak but she hasn't been dropping things as much    OBJECTIVE    Modality rationale: decrease pain and increase tissue extensibility to improve the patients ability to perform daily tasks   Min Type Additional Details    [] Estim:  []Unatt       []IFC  []Premod                        []Other:  []w/ice   []w/heat  Position:  Location:    [] Estim: []Att    []TENS instruct  []NMES                    []Other:  []w/US   []w/ice   []w/heat  Position:  Location:   10 [x]  Traction: [x] Cervical       []Lumbar                       [] Prone          [x]Supine                       [x]Intermittent   []Continuous Lbs: 18/9  [] before manual  [] after manual    []  Ultrasound: []Continuous   [] Pulsed                           []1MHz   []3MHz W/cm2:  Location:    []  Iontophoresis with dexamethasone         Location: [] Take home patch   [] In clinic    []  Ice     []  heat  []  Ice massage  []  Laser   []  Anodyne Position:  Location:    []  Laser with stim  []  Other:  Position:  Location:    []  Vasopneumatic Device Pressure:       [] lo [] med [] hi   Temperature: [] lo [] med [] hi   [] Skin assessment post-treatment:  []intact []redness- no adverse reaction    []redness - adverse reaction:       15 min Therapeutic Exercise:  [x] See flow sheet :   Rationale: increase ROM and increase strength to improve the patients ability to perform ADLs    27 min Neuromuscular Re-education:  [x]  See flow sheet :   Rationale: increase strength and increase proprioception  to improve the patients ability to perform functional tasks            With   [] TE   [] TA   [] neuro   [] other: Patient Education: [x] Review HEP    [] Progressed/Changed HEP based on:   [] positioning   [] body mechanics   [] transfers   [] heat/ice application    [] other:      Other Objective/Functional Measures:      Pain Level (0-10 scale) post treatment: 1    ASSESSMENT/Changes in Function: Demo's good postural awareness with therex, min vc's with c/s retraction in prone. Notes decr'd pain since [facility] and has not beeing dropping objects with left hand as often. Pt states LBP has taken over and is looking forward to f/u with MD on 1/4/2021 to address it. Decr'd pain following treatment today. Patient will continue to benefit from skilled PT services to modify and progress therapeutic interventions, address functional mobility deficits, address ROM deficits, address strength deficits, analyze and address soft tissue restrictions, analyze and cue movement patterns, analyze and modify body mechanics/ergonomics and assess and modify postural abnormalities to attain remaining goals. []  See Plan of Care  []  See progress note/recertification  []  See Discharge Summary         Progress towards goals / Updated goals:  Short Term Goals: To be accomplished in 2 weeks:   1.   Pt will report compliance HEP in order to supplement PT treatment              Eval = established              Reports compliance  2.   Pt will report max pain 7/10 in order to improve ability to sleep at night              Eval = 10/10              Appears to be declining 3/10 upon arrival      Long Term Goals: To be accomplished in 9 treatments:  1.   Pt will score at least 68 on FOTO in order to facilitate return to PLOF.               Eval = 55              To be reassessed at end of POC  2.   Pt will demonstrate bilateral lower trap strength to at least 4/5 in order reduce pain with ADLs              Eval = bilaterally 3/5              Tolerating progression of exercises  3.   Pt will demonstrate left elbow extension and wrist flexion strength to a minimum 5-/5 in order to improve ability to perform heavy ADLs.               Eval = 4/5 into both planes                   Tolerating progression of exercises  4.   Pt will demonstrate left  strength to at least 72lbs in order to prevent dropping objects while working   [employer] = 58lbs              Tolerating progression of exercises    PLAN  []  Upgrade activities as tolerated     [x]  Continue plan of care  []  Update interventions per flow sheet       []  Discharge due to:_  []  Other:_      Darlene Ron PTA 12/22/2020  11:16 AM    Future Appointments   Date Time Provider Libia Medel   12/24/2020  8:15 AM [facility]   12/29/2020  8:15 AM [facility]   12/31/2020  8:15 AM [facility]   1/4/2021  9:45 AM Carla Vazquez PA-C [facility]

## 2020-12-23 ENCOUNTER — DOCUMENTATION ONLY (OUTPATIENT)
Dept: ORTHOPEDIC SURGERY | Age: 50
End: 2020-12-23

## 2020-12-24 ENCOUNTER — HOSPITAL ENCOUNTER (OUTPATIENT)
Dept: PHYSICAL THERAPY | Age: 50
Discharge: HOME | End: 2020-12-24
Payer: COMMERCIAL

## 2020-12-24 PROCEDURE — 97012 MECHANICAL TRACTION THERAPY: CPT

## 2020-12-24 PROCEDURE — 97112 NEUROMUSCULAR REEDUCATION: CPT

## 2020-12-24 PROCEDURE — 97110 THERAPEUTIC EXERCISES: CPT

## 2020-12-24 NOTE — PROGRESS NOTES
PT DAILY TREATMENT NOTE -    Patient Name: Lucinda Davila  Date:2020  : 1970  [x]  Patient  Verified  Payor: Katherine Pettit / [place]: [place] [employer] [place] / Product Type: PPO /    In time:815  Out time:908  Total Treatment Time (min): 53  Visit #: 5 of 9    Treatment Area: Neck pain [M54.2]    SUBJECTIVE  Pain Level (0-10 scale): 4-5  Any medication changes, allergies to medications, adverse drug reactions, diagnosis change, or new procedure performed?: [x] No    [] Yes (see summary sheet for update)  Subjective functional status/changes:   [] No changes reported  \"my neck is pretty sore today but its the back that's really killing me. \"    OBJECTIVE         Modality rationale: decrease pain and increase tissue extensibility to improve the patients ability to perform daily tasks   Min Type Additional Details      []? Estim:  []? Unatt       []? IFC  []? Premod                        []?Other:  []?w/ice   []?w/heat  Position:  Location:      []? Estim: []? Att    []? TENS instruct  []? NMES                    []?Other:  []?w/US   []?w/ice   []?w/heat  Position:  Location:    10 [x]? Traction: [x]? Cervical       []? Lumbar                       []? Prone          [x]? Supine                       [x]? Intermittent   []? Continuous Lbs:   []? before manual  []? after manual      []? Ultrasound: []? Continuous   []? Pulsed                           []? 1MHz   []? 3MHz W/cm2:  Location:      []? Iontophoresis with dexamethasone         Location: []? Take home patch   []? In clinic    With traction  []? Ice     [x]? heat  []? Ice massage  []? Laser   []? Anodyne Position:  Location:      []? Laser with stim  []? Other:  Position:  Location:      []? Vasopneumatic Device Pressure:       []? lo []? med []? hi   Temperature: []? lo []? med []? hi    [x]? Skin assessment post-treatment:  [x]? intact []? redness- no adverse reaction    []? redness - adverse reaction:           15 min Therapeutic Exercise:  [x]?  See flow sheet :   Rationale: increase ROM and increase strength to improve the patients ability to perform ADLs     28 min Neuromuscular Re-education:  [x]? See flow sheet :   Rationale: increase strength and increase proprioception  to improve the patients ability to perform functional tasks             With   [] TE   [] TA   [] neuro   [] other: Patient Education: [x] Review HEP    [] Progressed/Changed HEP based on:   [] positioning   [] body mechanics   [] transfers   [] heat/ice application    [] other:      Other Objective/Functional Measures:      Pain Level (0-10 scale) post treatment: 3    ASSESSMENT/Changes in Function: Ms. Mirna Fleischer was limited with some exercises due to low back pain. Held on prone activities to prevent exacerbation of symptoms. Neck pain improved post exercises and traction with moist heat. Patient will continue to benefit from skilled PT services to modify and progress therapeutic interventions, address functional mobility deficits, address ROM deficits, address strength deficits, analyze and address soft tissue restrictions, analyze and cue movement patterns, analyze and modify body mechanics/ergonomics, assess and modify postural abnormalities, address imbalance/dizziness and instruct in home and community integration to attain remaining goals. []  See Plan of Care  []  See progress note/recertification  []  See Discharge Summary         Progress towards goals / Updated goals:  Short Term Goals: To be accomplished in 2 weeks: 1.   Pt will report compliance HEP in order to supplement PT treatment              Eval = established              Reports compliance  2.   Pt will report max pain 7/10 in order to improve ability to sleep at night              Eval = 10/10              Appears to be declining 3/10 upon arrival      Long Term Goals: To be accomplished in 9 treatments:  1.   Pt will score at least 68 on FOTO in order to facilitate return to PLOF.                Eval = 54              To be reassessed at end of POC  2.   Pt will demonstrate bilateral lower trap strength to at least 4/5 in order reduce pain with ADLs              Eval = bilaterally 3/5              Tolerating progression of exercises  3.   Pt will demonstrate left elbow extension and wrist flexion strength to a minimum 5-/5 in order to improve ability to perform heavy ADLs.               Eval = 4/5 into both planes                   Tolerating progression of exercises  4.   Pt will demonstrate left  strength to at least 72lbs in order to prevent dropping objects while working               Eval = 58lbs              Tolerating progression of exercises    PLAN  []  Upgrade activities as tolerated     [x]  Continue plan of care  []  Update interventions per flow sheet       []  Discharge due to:_  []  Other:_      Daphne Wilder, PT 12/24/2020  8:19 AM    Future Appointments   Date Time Provider Libia Medel   12/29/2020  8:15 AM Aster Mccabe, PT [facility]   12/31/2020  8:15 AM Marylu Beasley, PT [facility]   1/4/2021  9:45 AM Ray Vazquez PA-C [facility]

## 2020-12-29 ENCOUNTER — HOSPITAL ENCOUNTER (OUTPATIENT)
Dept: PHYSICAL THERAPY | Age: 50
Discharge: HOME | End: 2020-12-29
Payer: COMMERCIAL

## 2020-12-29 PROCEDURE — 97110 THERAPEUTIC EXERCISES: CPT

## 2020-12-29 PROCEDURE — 97112 NEUROMUSCULAR REEDUCATION: CPT

## 2020-12-29 PROCEDURE — 97012 MECHANICAL TRACTION THERAPY: CPT

## 2020-12-29 NOTE — PROGRESS NOTES
PT DAILY TREATMENT NOTE     Patient Name: Amanda Backbone  Date:2020  : 1970  [x]  Patient  Verified  Payor: Federico Garcia / Plan: [place] [employer] [place] / Product Type: PPO /    In time:818  Out time:920  Total Treatment Time (min): 62  Visit #: 6 of 9    Treatment Area: Neck pain [M54.2]    SUBJECTIVE  Pain Level (0-10 scale): 2  Any medication changes, allergies to medications, adverse drug reactions, diagnosis change, or new procedure performed?: [x] No    [] Yes (see summary sheet for update)  Subjective functional status/changes:   [] No changes reported  \"not as bad today as last time. \"    OBJECTIVE              Modality rationale: decrease pain and increase tissue extensibility to improve the patients ability to perform daily tasks   Min Type Additional Details       []? ? Estim:  []?? Unatt       []? ?IFC  []? ?Premod                        []? ? Other:  []??w/ice   []? ?w/heat  Position:  Location:       []? ? Estim: []??Att    []? ?TENS instruct  []? ?NMES                    []? ? Other:  []??w/US   []? ?w/ice   []? ?w/heat  Position:  Location:     10 [x]? ?  Traction: [x]? ? Cervical       []? ?Lumbar                       []? ? Prone          [x]? ?Supine                       [x]? ? Intermittent   []? ? Continuous Lbs: /  []?? before manual  []? ? after manual       []? ?  Ultrasound: []??Continuous   []? ? Pulsed                           []? ?1MHz   []? ? 3MHz W/cm2:  Location:       []? ?  Iontophoresis with dexamethasone         Location: []?? Take home patch   []? ? In clinic     With traction  []? ?  Ice     [x]? ?  heat  []? ?  Ice massage  []? ?  Laser   []? ?  Anodyne Position:  Location:       []? ?  Laser with stim  []? ?  Other:  Position:  Location:       []? ?  Vasopneumatic Device Pressure:       []? ? lo []? ? med []?? hi   Temperature: []?? lo []? ? med []?? hi     [x]? ? Skin assessment post-treatment:  [x]? ? intact []? ?redness- no adverse reaction    []? ?redness - adverse reaction:            25 min Therapeutic Exercise:  -?? See flow sheet :   Rationale: increase ROM and increase strength to improve the patients ability to perform ADLs     27 min Neuromuscular Re-education:  [x]? ?  See flow sheet :   Rationale: increase strength and increase proprioception  to improve the patients ability to perform functional tasks               With   [] TE   [] TA   [] neuro   [] other: Patient Education: [x] Review HEP    [] Progressed/Changed HEP based on:   [] positioning   [] body mechanics   [] transfers   [] heat/ice application    [] other:      Other Objective/Functional Measures:      Pain Level (0-10 scale) post treatment: 0    ASSESSMENT/Changes in Function: ms. Rj Browning did well with return to prior exercises, keeping modifications in place to prevent exacerbation of back pain. Reports relief with exercises and traction for pain. Saw improved UE and  strength today which is encouraging for recovery. Patient will continue to benefit from skilled PT services to modify and progress therapeutic interventions, address functional mobility deficits, address ROM deficits, address strength deficits, analyze and address soft tissue restrictions, analyze and cue movement patterns, analyze and modify body mechanics/ergonomics, assess and modify postural abnormalities, address imbalance/dizziness and instruct in home and community integration to attain remaining goals. []  See Plan of Care  []  See progress note/recertification  []  See Discharge Summary         Progress towards goals / Updated goals:  Short Term Goals: To be accomplished in 2 weeks:   1.   Pt will report compliance HEP in order to supplement PT treatment              Eval = established              Reports compliance  2.   Pt will report max pain 7/10 in order to improve ability to sleep at night              Eval = 10/10              Appears to be declining 3/10 upon arrival      Long Term Goals: To be accomplished in 9 treatments:  1.   Pt will score at least 68 on FOTO in order to facilitate return to PLOF.             Eval = 55              To be reassessed at end of POC  2.   Pt will demonstrate bilateral lower trap strength to at least 4/5 in order reduce pain with ADLs              Eval = bilaterally 3/5              Tolerating progression of exercises  3.   Pt will demonstrate left elbow extension and wrist flexion strength to a minimum 5-/5 in order to improve ability to perform heavy ADLs.               Eval = 4/5 into both planes                   MET: 5-/5 b  4.   Pt will demonstrate left  strength to at least 72lbs in order to prevent dropping objects while working   [employer] = 58lbs              MET: 80 #       PLAN  []  Upgrade activities as tolerated     [x]  Continue plan of care  []  Update interventions per flow sheet       []  Discharge due to:_  []  Other:_      Lisa De La Garza, PT 12/29/2020  8:49 AM    Future Appointments   Date Time Provider Libia Medel   12/31/2020  8:15 AM Sukh Vasques, PT [facility] [address]   1/4/2021  9:45 AM Keli Vazquez PA-C [facility]

## 2020-12-31 ENCOUNTER — HOSPITAL ENCOUNTER (OUTPATIENT)
Dept: PHYSICAL THERAPY | Age: 50
Discharge: HOME | End: 2020-12-31
Payer: COMMERCIAL

## 2020-12-31 PROCEDURE — 97112 NEUROMUSCULAR REEDUCATION: CPT

## 2020-12-31 PROCEDURE — 97110 THERAPEUTIC EXERCISES: CPT

## 2020-12-31 PROCEDURE — 97530 THERAPEUTIC ACTIVITIES: CPT

## 2020-12-31 NOTE — PROGRESS NOTES
[facility] - [place] [address]  [address], [address] [phone] fax    Progress Note  Patient name: Oliver Martin Start of Care: 2020   Referral source: Vinny Jacob : 1970                Medical Diagnosis: Neck pain [M54.2]  Payor: [facility] / Plan: [place] [employer] P[place] / Product Type: PPO /  Onset Date:2020                Treatment Diagnosis: neck pain   Prior Hospitalization: see medical history Provider#: [id number]   Medications: Verified on Patient summary List    Comorbidities: history of LBP   Prior Level of Function: functionally (I); works sedentary job at a desk     Visits from [facility]: 7    Missed Visits: 1    Established Goals:         [address] be accomplished in 2 weeks: 1.   Pt will report compliance HEP in order to supplement PT treatment              Eval = established             MET  2.   Pt will report max pain 7/10 in order to improve ability to sleep at night              Eval = 10/10              MET      Long Term Goals: To be accomplished in 9 treatments:  1.   Pt will score at least 68 on FOTO in order to facilitate return to PLOF.             Eval = 55              Progressing = 63  2.   Pt will demonstrate bilateral lower trap strength to at least 4/5 in order reduce pain with ADLs              Eval = bilaterally 3/5              MET = 4/5  3.   Pt will demonstrate left elbow extension and wrist flexion strength to a minimum 5-/5 in order to improve ability to perform heavy ADLs.               Eval = 4/5 into both planes                   MET: 5-/5 b  4.   Pt will demonstrate left  strength to at least 72lbs in order to prevent dropping objects while working               Eval = 58lbs              MET: 80 #    Key Functional Changes:   Functional Gains: decreased pain, improved ROM, decreased dropping things with left hand  Functional Deficits: intermittent pain and left hand weakness, when back pain increases so does the neck  % improvement: 60-70%  Pain   Average: 2-3/10       Best: 0/10     Worst: 6-7/10  Patient Goal: \"decreased pain and muscle tightness\"    Updated Goals: to be achieved in 4 weeks: 1.   Pt will score at least 68 on FOTO in order to facilitate return to PLOF.             PN status: Progressing = 63  2.   Pt will demonstrate bilateral lower trap strength to at least 5/5 in order reduce pain with ADLs              PN status: updated goal = 4/5 B  3.   Pt will report max pain 5/10 in order to improve ability to sleep at night              PN status: updated goal: 7/10    ASSESSMENT/RECOMMENDATIONS:  Ms. Joce Bond reports 60-70% improvement since beginning therapy. Pt has had reduction in HA to maximum 1x/week, reports of improved ROM and decreased frequency of dropping things with her left hand. Pt continues to have intermittent moderate pain and weakness on the left that is exacerbated with low back pain.  Pt will benefit from continued skilled PT in order to address remaining deficits and maximize functional potential.    [x]Continue therapy per initial plan/protocol at a frequency of  2 x per week for 4 weeks  []Continue therapy with the following recommended changes:_____________________      _____________________________________________________________________  []Discontinue therapy progressing towards or have reached established goals  []Discontinue therapy due to lack of appreciable progress towards goals  []Discontinue therapy due to lack of attendance or compliance  []Await Physician's recommendations/decisions regarding therapy  []Other:________________________________________________________________    Thank you for this referral.    Flor Carmona, PT 12/31/2020 8:33 AM  NOTE TO PHYSICIAN:  [address]   FAX TO [facility]: (58   If you are unable to process this request in 24 hours please contact our office: [phone]    [] I have read the above report and request that my patient continue as recommended. []  I have read the above report and request that my patient continue therapy with the following changes/special instructions:________________________________________  [] I have read the above report and request that my patient be discharged from therapy.     [de-identified] Signature:____________Date:_________TIME:________    [facility], Date and Time must be completed for valid certification **

## 2020-12-31 NOTE — PROGRESS NOTES
PT DAILY TREATMENT NOTE     Patient Name: Cristofer Tran  Date:2020  : 1970  [x]  Patient  Verified  Payor: [employer] / Plan: [place] [employer] P[place] / Product Type: PPO /    In time:819  Out time:903  Total Treatment Time (min): 44  Visit #: 7 of 9    Treatment Area: Neck pain [M54.2]    SUBJECTIVE  Pain Level (0-10 scale): 5  Any medication changes, allergies to medications, adverse drug reactions, diagnosis change, or new procedure performed?: [x] No    [] Yes (see summary sheet for update)  Subjective functional status/changes:   [] No changes reported  See PN; reports increased pain in neck when back is hurting more, today her back hurts a lot    OBJECTIVE    Modality rationale: decrease pain to improve the patients ability to sleep    Min Type Additional Details    [] Estim:  []Unatt       []IFC  []Premod                        []Other:  []w/ice   []w/heat  Position:  Location:    [] Estim: []Att    []TENS instruct  []NMES                    []Other:  []w/US   []w/ice   []w/heat  Position:  Location:    []  Traction: [] Cervical       []Lumbar                       [] Prone          []Supine                       []Intermittent   []Continuous Lbs:  [] before manual  [] after manual    []  Ultrasound: []Continuous   [] Pulsed                           []1MHz   []3MHz W/cm2:  Location:    []  Iontophoresis with dexamethasone         Location: [] Take home patch   [] In clinic   10 []  Ice     [x]  heat  []  Ice massage  []  Laser   []  Anodyne Position: supine  Location: cervical and lumbar    []  Laser with stim  []  Other:  Position:  Location:    []  Vasopneumatic Device Pressure:       [] lo [] med [] hi   Temperature: [] lo [] med [] hi   [] Skin assessment post-treatment:  [x]intact []redness- no adverse reaction    []redness - adverse reaction:     10 min Therapeutic Exercise:  [x] See flow sheet :   Rationale: increase ROM and increase strength to improve the patients ability to perform ADLs    10 min Therapeutic Activity:  [x] See flow sheet : reassessment,    Rationale: increase strength, educatoin to improve the patients ability to perform ADLs, review POC     14 min Neuromuscular Re-education:  [x]  See flow sheet : cervical and scapular stabilization    Rationale: increase strength, improve coordination and increase proprioception  to improve the patients ability to tolerate sustained postures        With   [] TE   [] TA   [] neuro   [] other: Patient Education: [x] Review HEP    [] Progressed/Changed HEP based on:   [] positioning   [] body mechanics   [] transfers   [] heat/ice application    [] other:      Other Objective/Functional Measures: see PN      Pain Level (0-10 scale) post treatment: 3    ASSESSMENT/Changes in Function: see PN; deferred standing exercises due to severe LBP    Patient will continue to benefit from skilled PT services to modify and progress therapeutic interventions, address functional mobility deficits, address ROM deficits, address strength deficits, analyze and address soft tissue restrictions, analyze and cue movement patterns, analyze and modify body mechanics/ergonomics, assess and modify postural abnormalities, address imbalance/dizziness and instruct in home and community integration to attain remaining goals. []  See Plan of Care  [x]  See progress note/recertification  []  See Discharge Summary         Progress towards goals / Updated goals: 1.   Pt will score at least 68 on FOTO in order to facilitate return to PLOF.                ZW status: Progressing = 63  2.   Pt will demonstrate bilateral lower trap strength to at least 5/5 in order reduce pain with ADLs              PN status: updated goal = 4/5 B  3.   Pt will report max pain 5/10 in order to improve ability to sleep at night              PN status: updated goal: 7/10    PLAN  [x]  Upgrade activities as tolerated     [x]  Continue plan of care  []  Update interventions per flow sheet []  Discharge due to:_  []  Other:_      Nancy Cowden, PT 12/31/2020  8:44 AM    Future Appointments   Date Time Provider Libia Medel   1/4/2021  9:45 AM Krystina Vazquez PA-C [facility]

## 2021-01-04 ENCOUNTER — DOCUMENTATION ONLY (OUTPATIENT)
Dept: ORTHOPEDIC SURGERY | Age: 51
End: 2021-01-04

## 2021-01-04 ENCOUNTER — OFFICE VISIT (OUTPATIENT)
Dept: ORTHOPEDIC SURGERY | Age: 51
End: 2021-01-04
Payer: COMMERCIAL

## 2021-01-04 VITALS
DIASTOLIC BLOOD PRESSURE: 88 MMHG | WEIGHT: 205 LBS | HEART RATE: 96 BPM | BODY MASS INDEX: 35 KG/M2 | OXYGEN SATURATION: 98 % | HEIGHT: 64 IN | RESPIRATION RATE: 16 BRPM | SYSTOLIC BLOOD PRESSURE: 145 MMHG | TEMPERATURE: 97.1 F

## 2021-01-04 DIAGNOSIS — M47.816 LUMBAR FACET ARTHROPATHY: Primary | ICD-10-CM

## 2021-01-04 DIAGNOSIS — G89.29 CHRONIC BILATERAL LOW BACK PAIN WITHOUT SCIATICA: ICD-10-CM

## 2021-01-04 DIAGNOSIS — M54.50 CHRONIC BILATERAL LOW BACK PAIN WITHOUT SCIATICA: ICD-10-CM

## 2021-01-04 PROCEDURE — 99214 OFFICE O/P EST MOD 30 MIN: CPT | Performed by: PHYSICIAN ASSISTANT

## 2021-01-04 RX ORDER — TRAMADOL HYDROCHLORIDE 50 MG/1
50 TABLET ORAL
Qty: 21 TAB | Refills: 0 | Status: SHIPPED | OUTPATIENT
Start: 2021-01-04 | End: 2021-01-11

## 2021-01-04 NOTE — PROGRESS NOTES
Adams Jenkins returns to the office for follow-up and review of her MRI as below:    Result Information    Status: Final result (Exam End: 12/15/2020 08:24) Provider Status: Reviewed   Study Result    EXAM: Lumbar MRI without gadolinium     CLINICAL INDICATION/HISTORY: Low back pain, left leg pain and foot numbness    > Additional: None.     COMPARISON: 9/15/2006    > Reference Exam: None.     TECHNIQUE: Sagittal FLAIRT1, T2 and STIR sequences, and axial T1 and T2  sequences were obtained of the lumbar spine. Imaging performed on wide bore  Discovery [id number] GEM suite 3T magnet at [facility].      _______________     FINDINGS:     Interval progression of bilateral facet arthropathy L5/S1 with slight anterior  listhesis L5 on S1. Also progression of disc space narrowing and chronic  degenerative endplate marrow changes T11/12. No new fracture or spondylolysis. No marrow edema or neoplastic marrow signal. The conus medullaris is located at  the L1 level and has a normal appearance and signal.      2.3 cm thin-walled likely physiologic right ovarian cyst with several additional  prominent right ovarian follicles. Remainder visualized retroperitoneum, lower  thoracic and upper sacral segments unremarkable.     T11/12 shows mild disc bulge and degenerative spondylosis with mild facet  arthropathy without significant canal stenosis with mild to moderate bilateral  subarticular foraminal stenosis.     L1/2 level: Unremarkable.      L2/3 level: Unremarkable.     L3/4 level: Unremarkable.     L4/5 level: Mild bilateral facet arthropathy with minimal degenerative  spondylosis with no stenosis.     L5/S1 level: Interval progression of moderate to severe bilateral facet  arthropathy with slight anterior listhesis with no new significant stenosis.       _______________     IMPRESSION  IMPRESSION:     1.  Interval progression of bilateral L5/S1 facet arthropathy with slight  anterior spondylolisthesis, no significant stenosis. Suggest further evaluation  with flexion/extension imaging.     2. Interval progression of degenerative spondylosis T11/12 with mild to moderate  bilateral foraminal stenosis, no significant canal stenosis.     3. No other new significant findings.        Due to the severe pain she was not able to finish physical therapy at her last visit. She has progressed with pain in her low back with radiation into her legs. No bowel or bladder incontinence. The above MRI reflects interval progression of bilateral facet arthropathy with the slight anterior spondylolisthesis. There was no significant stenosis noted. At this point patient is continuing her Voltaren enteric-coated tablets. She will continue in for acute pain she was augmented with tramadol 50 mg 1 up to 3 times a day. I would like to see her seen by spine in the area. We did discuss options for care to include but not limited to continuation of physical therapy with spine referral.  She agreed. Today all of her questions answered to her satisfaction. Copy of her MRI reviewed and provided.

## 2021-01-04 NOTE — PROGRESS NOTES
Patient called and given update on referral to [facility]; patient also given number to [facility]. Zara Arrow form was completed and faxed with office notes, received confirmation. Original form filed and a copy was placed to be scanned. Patient verbalized understanding.

## 2021-01-05 ENCOUNTER — APPOINTMENT (OUTPATIENT)
Dept: PHYSICAL THERAPY | Age: 51
End: 2021-01-05
Payer: COMMERCIAL

## 2021-01-08 ENCOUNTER — APPOINTMENT (OUTPATIENT)
Dept: PHYSICAL THERAPY | Age: 51
End: 2021-01-08
Payer: COMMERCIAL

## 2021-01-12 ENCOUNTER — APPOINTMENT (OUTPATIENT)
Dept: PHYSICAL THERAPY | Age: 51
End: 2021-01-12
Payer: COMMERCIAL

## 2021-01-14 ENCOUNTER — APPOINTMENT (OUTPATIENT)
Dept: PHYSICAL THERAPY | Age: 51
End: 2021-01-14
Payer: COMMERCIAL

## 2021-01-18 ENCOUNTER — DOCUMENTATION ONLY (OUTPATIENT)
Dept: ORTHOPEDIC SURGERY | Age: 51
End: 2021-01-18

## 2021-01-18 RX ORDER — DICLOFENAC SODIUM 75 MG/1
TABLET, DELAYED RELEASE ORAL
Qty: 60 TAB | Refills: 0 | Status: SHIPPED | OUTPATIENT
Start: 2021-01-18 | End: 2021-02-18

## 2021-01-19 ENCOUNTER — APPOINTMENT (OUTPATIENT)
Dept: PHYSICAL THERAPY | Age: 51
End: 2021-01-19
Payer: COMMERCIAL

## 2021-01-21 ENCOUNTER — APPOINTMENT (OUTPATIENT)
Dept: PHYSICAL THERAPY | Age: 51
End: 2021-01-21
Payer: COMMERCIAL

## 2021-01-26 ENCOUNTER — APPOINTMENT (OUTPATIENT)
Dept: PHYSICAL THERAPY | Age: 51
End: 2021-01-26
Payer: COMMERCIAL

## 2021-01-28 ENCOUNTER — APPOINTMENT (OUTPATIENT)
Dept: PHYSICAL THERAPY | Age: 51
End: 2021-01-28
Payer: COMMERCIAL

## 2021-01-29 ENCOUNTER — HOSPITAL ENCOUNTER (OUTPATIENT)
Dept: PHYSICAL THERAPY | Age: 51
Discharge: HOME | End: 2021-01-29
Payer: COMMERCIAL

## 2021-01-29 PROCEDURE — 97530 THERAPEUTIC ACTIVITIES: CPT

## 2021-01-29 PROCEDURE — 97110 THERAPEUTIC EXERCISES: CPT

## 2021-01-29 PROCEDURE — 97112 NEUROMUSCULAR REEDUCATION: CPT

## 2021-01-29 NOTE — PROGRESS NOTES
PT DAILY TREATMENT NOTE 11    Patient Name: Daya Lizama  Date:2021  : 1970  [x]  Patient  Verified  Payor: Aaron Human / Plan: [place] [employer] / Product Type: PPO /    In time:730  Out time:817  Total Treatment Time (min): 47  Visit #: 1 of 8    Treatment Area: Neck pain [M54.2]    SUBJECTIVE  Pain Level (0-10 scale): 1-2  Any medication changes, allergies to medications, adverse drug reactions, diagnosis change, or new procedure performed?: [x] No    [] Yes (see summary sheet for update)  Subjective functional status/changes:   [] No changes reported  \"Not much pain this morning. \"    OBJECTIVE    Modality rationale: decrease pain to improve the patients ability to improve mobility and positional tolerance   Min Type Additional Details    [] Estim:  []Unatt       []IFC  []Premod                        []Other:  []w/ice   []w/heat  Position:  Location:    [] Estim: []Att    []TENS instruct  []NMES                    []Other:  []w/US   []w/ice   []w/heat  Position:  Location:    []  Traction: [] Cervical       []Lumbar                       [] Prone          []Supine                       []Intermittent   []Continuous Lbs:  [] before manual  [] after manual    []  Ultrasound: []Continuous   [] Pulsed                           []1MHz   []3MHz W/cm2:  Location:    []  Iontophoresis with dexamethasone         Location: [] Take home patch   [] In clinic   10 []  Ice     [x]  heat  []  Ice massage  []  Laser   []  Anodyne Position: supine with wedge  Location: neck    []  Laser with stim  []  Other:  Position:  Location:    []  Vasopneumatic Device Pressure:       [] lo [] med [] hi   Temperature: [] lo [] med [] hi   [x] Skin assessment post-treatment:  [x]intact []redness- no adverse reaction    []redness - adverse reaction:     10 min Therapeutic Exercise:  [x] See flow sheet :   Rationale: increase ROM and increase strength to improve the patients ability to perform ADLs    10 min Therapeutic Activity:  [x]  See flow sheet : reassessment, FOTO, functional reaching activities    Rationale: increase ROM, increase strength, improve coordination, improve balance and increase proprioception  to improve the patients ability to improve mobility and ADL performance     17 min Neuromuscular Re-education:  [x]  See flow sheet : scap and postural re-ed activities    Rationale: increase ROM, increase strength, improve coordination, improve balance and increase proprioception  to improve the patients ability to improve mobility and upright posture        With   [x] TE   [x] TA   [x] neuro   [] other: Patient Education: [x] Review HEP    [] Progressed/Changed HEP based on:   [x] positioning   [x] body mechanics   [] transfers   [] heat/ice application    [] other:      Other Objective/Functional Measures:   FOTO 65    MMT bilateral lower trap 4/5     Pain Level (0-10 scale) post treatment: 1    ASSESSMENT/Changes in Function: Ms. Raffaele Randle reports 80% improvement since beginning therapy. Pt reports decreased radicular sx down left UE, but still gets pain to the left shoulder limiting reaching to high shelves at home. She states that pain still gets up to 7/10, but with less frequency. Continues to have some strength deficits in bilateral lower trap. Pt has had about a 1 month lapse in treatment secondary to COVID-19. We will continue with therapy to address her remaining functional deficits. Patient will continue to benefit from skilled PT services to modify and progress therapeutic interventions, address functional mobility deficits, address ROM deficits, address strength deficits, analyze and address soft tissue restrictions, analyze and cue movement patterns, analyze and modify body mechanics/ergonomics, assess and modify postural abnormalities, address imbalance/dizziness and instruct in home and community integration to attain remaining goals.      [x]  See Plan of Care  [x]  See progress note/recertification  []  See Discharge Summary         Progress towards goals / Updated goals: 1.   Pt will score at least 68 on FOTO in order to facilitate return to PLOF.             DU status: Progressing = 61   PROGRESSING; 65  2.   Pt will demonstrate bilateral lower trap strength to at least 5/5 in order reduce pain with ADLs              PN status: updated goal = 4/5 B   PROGRESSING; B 4/5  3.   Pt will report max pain 5/10 in order to improve ability to sleep at night              PN status: updated goal: 7/10    PROGRESSING; max pain of 7/10, but less frequent (2-3 times per week)    Functional Gains: pain, decreased headaches, improved radicular sx down into left hand, ADLs  Functional Deficits: looking down, extended periods of sitting, occasional difficulty reaching to high shelves, radicular sx into shoulder  % improvement: 80%  Pain   Average: 2-3/10       Best: 2/10     Worst: 7/10  Patient Goal: \"being able to tolerate the pain and be confident enough to ride my motorcycle again. \"    PLAN  []  Upgrade activities as tolerated     [x]  Continue plan of care  []  Update interventions per flow sheet       []  Discharge due to:_  []  Other:_      Lorri Remy, JORDANA, CSCS 1/29/2021  8:38 AM    No future appointments.

## 2021-02-01 NOTE — PROGRESS NOTES
[facility]  [address] [place], [address] [phone] fax Progress Note Patient Pauly Driscoll Start of [id number] Referral source: July Vazquez PA-C : 1970               
Medical Diagnosis: Neck pain [M54.2] Payor: Dixon Carbo / Plan: [place] [employer] [place] / Product Type: PPO /  Onset Date:2020               
Treatment Diagnosis: neck pain Prior Hospitalization: see medical history Provider#: [id number] Medications: Verified on Patient summary List  
 Comorbidities: history of LBP 
 Prior Level of Function: functionally (I); works sedentary job at a desk 
  
 
Visits from [facility] of Care: 8    Missed Visits: 1 Established Goals: 1.   Pt will score at least 68 on FOTO in order to facilitate return to PLOF.             PN status: Progressing = 63 PROGRESSING; 65 2.   Pt will demonstrate bilateral lower trap strength to at least 5/5 in order reduce pain with ADLs 
            PN status: updated goal = 4/5 B PROGRESSING; B 4/5 3.   Pt will report max pain 5/10 in order to improve ability to sleep at night 
            PN status: updated goal: 7/10 
            PROGRESSING; max pain of 7/10, but less frequent (2-3 times per week) 
  
Key Functional Changes:   
Functional Gains: pain, decreased headaches, improved radicular sx down into left hand, ADLs Functional Deficits: looking down, extended periods of sitting, occasional difficulty reaching to high shelves, radicular sx into shoulder 
% improvement: 80% Pain   Average: 2-3/10 Best: 2/10 Worst: 7/10 Patient Goal: \"being able to tolerate the pain and be confident enough to ride my motorcycle again. \" Updated Goals: to be achieved in 4 weeks: 1.   Pt will score at least 68 on FOTO in order to facilitate return to PLOF.   
            KV status: PROGRESSING; 65 
 2.   Pt will demonstrate bilateral lower trap strength to at least 5/5 in order reduce pain with ADLs 
            PN status:  PROGRESSING; B 4/5 3.   Pt will report max pain 5/10 in order to improve ability to sleep at night 
            PN status: PROGRESSING; max pain of 7/10, but less frequent (2-3 times per week) ASSESSMENT/RECOMMENDATIONS: Ms. Baltazar Ceron reports 80% improvement since beginning therapy. Pt reports decreased radicular sx down left UE, but still gets pain to the left shoulder limiting reaching to high shelves at home. She states that pain still gets up to 7/10, but with less frequency. Continues to have some strength deficits in bilateral lower trap. Pt has had about a 1 month lapse in treatment secondary to COVID-19; in addition, pt LBP is a limiting factor. We will continue with therapy to address her remaining functional deficits. [x]Continue therapy per initial plan/protocol at a frequency of  2 x per week for 4 weeks []Continue therapy with the following recommended changes:_____________________      _____________________________________________________________________ []Discontinue therapy progressing towards or have reached established goals []Discontinue therapy due to lack of appreciable progress towards goals []Discontinue therapy due to lack of attendance or compliance []Await Physician's recommendations/decisions regarding therapy []Other:________________________________________________________________ Thank you for this referral.   
Mohit Ashley, PT 2/1/2021 10:12 AM 
NOTE TO PHYSICIAN:  PLEASE COMPLETE THE ORDERS BELOW AND  
FAX TO [facility]: [phone] If you are unable to process this request in 24 hours please contact our office: [phone] []  I have read the above report and request that my patient continue as recommended. []  I have read the above report and request that my patient continue therapy with the following changes/special instructions:________________________________________ [] I have read the above report and request that my patient be discharged from therapy.  
 
[de-identified] Signature:____________Date:_________TIME:________ 
 
Irene Machado, Date and Time must be completed for valid certification **

## 2021-02-01 NOTE — PROGRESS NOTES
[facility]   [address]  [place], [address] [phone] fax    Progress Note  Patient name: Bruna Pizano Start of Care: 2020   Referral source: Gypsy Vazquez PA-C : 1970                Medical Diagnosis: Neck pain [M54.2]  Payor: Neftaly Ann / Plan: [place] [employer] PPO / Product Type: PPO /  Onset Date:2020                Treatment Diagnosis: neck pain   Prior Hospitalization: see medical history Provider#: [id number]   Medications: Verified on Patient summary List    Comorbidities: history of LBP   Prior Level of Function: functionally (I); works sedentary job at a desk        Visits from [place] of Care: 8                                      Missed Visits: 1     Established Goals: 1.   Pt will score at least 68 on FOTO in order to facilitate return to PLOF.             PN status: Progressing = 63              PROGRESSING; 65  2.   Pt will demonstrate bilateral lower trap strength to at least 5/5 in order reduce pain with ADLs              PN status: updated goal = 4/5 B              PROGRESSING; B 4/5  3.   Pt will report max pain 5/10 in order to improve ability to sleep at night              PN status: updated goal: 7/10              PROGRESSING; max pain of 7/10, but less frequent (2-3 times per week)     Key Functional Changes:    Functional Gains: pain, decreased headaches, improved radicular sx down into left hand, ADLs  Functional Deficits: looking down, extended periods of sitting, occasional difficulty reaching to high shelves, radicular sx into shoulder  % improvement: 80%  Pain   Average: 2-3/10                  Best: 2/10                Worst: 7/10  Patient Goal: \"being able to tolerate the pain and be confident enough to ride my motorcycle again. \"     Updated Goals: to be achieved in 4 weeks: 1.   Pt will score at least 68 on FOTO in order to facilitate return to PLOF.                RW status: PROGRESSING; 65  2.   Pt will demonstrate bilateral lower trap strength to at least 5/5 in order reduce pain with ADLs              PN status:  PROGRESSING; B 4/5  3.   Pt will report max pain 5/10 in order to improve ability to sleep at night              PN status: PROGRESSING; max pain of 7/10, but less frequent (2-3 times per week)     ASSESSMENT/RECOMMENDATIONS: Ms. Price reports 80% improvement since beginning therapy. Pt reports decreased radicular sx down left UE, but still gets pain to the left shoulder limiting reaching to high shelves at home. She states that pain still gets up to 7/10, but with less frequency. Continues to have some strength deficits in bilateral lower trap. Pt has had about a 1 month lapse in treatment secondary to COVID-19; in addition, pt LBP is a limiting factor. We will continue with therapy to address her remaining functional deficits.      [x]?Continue therapy per initial plan/protocol at a frequency of  2 x per week for 4 weeks  []?Continue therapy with the following recommended changes:_____________________      _____________________________________________________________________  []?Discontinue therapy progressing towards or have reached established goals  []?Discontinue therapy due to lack of appreciable progress towards goals  []?Discontinue therapy due to lack of attendance or compliance  []?Await Physician's recommendations/decisions regarding therapy  []?Other:________________________________________________________________

## 2021-02-03 ENCOUNTER — HOSPITAL ENCOUNTER (OUTPATIENT)
Dept: PHYSICAL THERAPY | Age: 51
Discharge: HOME | End: 2021-02-03
Payer: COMMERCIAL

## 2021-02-03 PROCEDURE — 97110 THERAPEUTIC EXERCISES: CPT

## 2021-02-03 PROCEDURE — 97112 NEUROMUSCULAR REEDUCATION: CPT

## 2021-02-03 NOTE — PROGRESS NOTES
PT DAILY TREATMENT NOTE     Patient Name: Micha Lobo  Date:2/3/2021  : 1970  [x]  Patient  Verified  Payor: Lisa Ford / Plan: [place] [employer] PPO / Product Type: PPO /    In time:218  Out time:305  Total Treatment Time (min): 52  Visit #: 2 of 8      Treatment Area: Neck pain [M54.2]    SUBJECTIVE  Pain Level (0-10 scale): 3  Any medication changes, allergies to medications, adverse drug reactions, diagnosis change, or new procedure performed?: [x] No    [] Yes (see summary sheet for update)  Subjective functional status/changes:   [] No changes reported  Patient reports increased pain today from trying to do some housework cleaning her room and scrubbing the tub.     OBJECTIVE    Modality rationale: decrease pain and increase tissue extensibility to improve the patients ability to perform ADLs   Min Type Additional Details    [] Estim:  []Unatt       []IFC  []Premod                        []Other:  []w/ice   []w/heat  Position:  Location:    [] Estim: []Att    []TENS instruct  []NMES                    []Other:  []w/US   []w/ice   []w/heat  Position:  Location:    []  Traction: [] Cervical       []Lumbar                       [] Prone          []Supine                       []Intermittent   []Continuous Lbs:  [] before manual  [] after manual    []  Ultrasound: []Continuous   [] Pulsed                           []1MHz   []3MHz W/cm2:  Location:    []  Iontophoresis with dexamethasone         Location: [] Take home patch   [] In clinic   10 []  Ice     [x]  heat  []  Ice massage  []  Laser   []  Anodyne Position: supine with bolster  Location: neck    []  Laser with stim  []  Other:  Position:  Location:    []  Vasopneumatic Device Pressure:       [] lo [] med [] hi   Temperature: [] lo [] med [] hi   [x] Skin assessment post-treatment:  [x]intact []redness- no adverse reaction    []redness - adverse reaction:       27 min Therapeutic Exercise:  [] See flow sheet :   Rationale: increase ROM and increase strength to improve the patients ability to increase activity tolerance     20 min Neuromuscular Re-education:  []  See flow sheet : scapular and cervical stabilization   Rationale: increase strength, improve coordination, improve balance and increase proprioception  to improve the patients ability to tolerate sustained postures          With   [] TE   [] TA   [] neuro   [] other: Patient Education: [x] Review HEP    [] Progressed/Changed HEP based on:   [] positioning   [] body mechanics   [] transfers   [] heat/ice application    [] other:      Other Objective/Functional Measures: held push ups today     Pain Level (0-10 scale) post treatment: 3    ASSESSMENT/Changes in Function: 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 She tolerated all exercises with c/o increased pain. Held plinth push ups today d/t increased pain. Patient will continue to benefit from skilled PT services to modify and progress therapeutic interventions, address functional mobility deficits, address ROM deficits, address strength deficits, analyze and address soft tissue restrictions, analyze and cue movement patterns, analyze and modify body mechanics/ergonomics, assess and modify postural abnormalities, address imbalance/dizziness and instruct in home and community integration to attain remaining goals. []  See Plan of Care  []  See progress note/recertification  []  See Discharge Summary         Progress towards goals / Updated goals:  Updated Goals: to be achieved in 4 weeks: 1.   Pt will score at least 68 on FOTO in order to facilitate return to PLOF.                PN status: PROGRESSING; 65  2.   Pt will demonstrate bilateral lower trap strength to at least 5/5 in order reduce pain with ADLs              PN status:  PROGRESSING; B 4/5  3.   Pt will report max pain 5/10 in order to improve ability to sleep at night              PN status: PROGRESSING; max pain of 7/10, but less frequent (2-3 times per week)    PLAN  [x]  Upgrade activities as tolerated     [x]  Continue plan of care  []  Update interventions per flow sheet       []  Discharge due to:_  []  Other:_      Tea Beck PTA 2/3/2021  1:21 PM    Future Appointments   Date Time Provider Libia Lizy   2/3/2021  2:15 PM Ai Falcon PTA [facility]   2/8/2021  9:00 AM Leonel Alarcon, PTA [facility]   2/10/2021  9:45 AM Leonel Alarcon, PTA [facility]   2/15/2021  9:00 AM Beto Grissom, PT [facility]   2/17/2021  9:00 AM Leonel Alarcon, PTA [facility]   2/22/2021  9:00 AM Leonel Alarcon, PTA [facility]   2/24/2021  9:00 AM Colin Dotson, PT [facility]

## 2021-02-08 ENCOUNTER — HOSPITAL ENCOUNTER (OUTPATIENT)
Dept: PHYSICAL THERAPY | Age: 51
Discharge: HOME | End: 2021-02-08
Payer: COMMERCIAL

## 2021-02-08 PROCEDURE — 97110 THERAPEUTIC EXERCISES: CPT

## 2021-02-08 PROCEDURE — 97112 NEUROMUSCULAR REEDUCATION: CPT

## 2021-02-08 PROCEDURE — 97530 THERAPEUTIC ACTIVITIES: CPT

## 2021-02-08 NOTE — PROGRESS NOTES
PT DAILY TREATMENT NOTE 11    Patient Name: Micha Lobo  Date:2021  : 1970  [x]  Patient  Verified  Payor: Lisa Ford / Plan: [place] [employer] / Product Type: PPO /    In time:904  Out time:954  Total Treatment Time (min): 50  Visit #: 3 of 8    Treatment Area: Neck pain [M54.2]    SUBJECTIVE  Pain Level (0-10 scale): 1-2  Any medication changes, allergies to medications, adverse drug reactions, diagnosis change, or new procedure performed?: [x] No    [] Yes (see summary sheet for update)  Subjective functional status/changes:   [] No changes reported  \"I have a little bit of pain today. \"    OBJECTIVE    Modality rationale: decrease pain to improve the patients ability to improve mobility and positional tolerance   Min Type Additional Details    [] Estim:  []Unatt       []IFC  []Premod                        []Other:  []w/ice   []w/heat  Position:  Location:    [] Estim: []Att    []TENS instruct  []NMES                    []Other:  []w/US   []w/ice   []w/heat  Position:  Location:    []  Traction: [] Cervical       []Lumbar                       [] Prone          []Supine                       []Intermittent   []Continuous Lbs:  [] before manual  [] after manual    []  Ultrasound: []Continuous   [] Pulsed                           []1MHz   []3MHz W/cm2:  Location:    []  Iontophoresis with dexamethasone         Location: [] Take home patch   [] In clinic   10 []  Ice     [x]  heat  []  Ice massage  []  Laser   []  Anodyne Position: supine with wedge   Location: neck    []  Laser with stim  []  Other:  Position:  Location:    []  Vasopneumatic Device Pressure:       [] lo [] med [] hi   Temperature: [] lo [] med [] hi   [x] Skin assessment post-treatment:  [x]intact []redness- no adverse reaction    []redness - adverse reaction:     8 min Therapeutic Exercise:  [x] See flow sheet :   Rationale: increase ROM and increase strength to improve the patients ability to perform ADLs    8 min Therapeutic Activity:  [x]  See flow sheet : functional reaching activities   Rationale: increase ROM, increase strength, improve coordination, improve balance and increase proprioception  to improve the patients ability to improve mobility and ADL performance     24 min Neuromuscular Re-education:  [x]  See flow sheet :scap and postural re-ed activities     Rationale: increase ROM, increase strength, improve coordination, improve balance and increase proprioception  to improve the patients ability to improve mobility and reaching        With   [x] TE   [x] TA   [x] neuro   [] other: Patient Education: [x] Review HEP    [] Progressed/Changed HEP based on:   [x] positioning   [x] body mechanics   [] transfers   [] heat/ice application    [] other:      Other Objective/Functional Measures:      Pain Level (0-10 scale) post treatment: 1-2    ASSESSMENT/Changes in Function: Pt responded well to progression of resistance and volume of exercises today. Her pain and radicular sx are improving. She reports that she is mostly limited by her back pain at this time which she is awaiting a PT script to be sent over from her physician. Patient will continue to benefit from skilled PT services to modify and progress therapeutic interventions, address functional mobility deficits, address ROM deficits, address strength deficits, analyze and address soft tissue restrictions, analyze and cue movement patterns, analyze and modify body mechanics/ergonomics, assess and modify postural abnormalities, address imbalance/dizziness and instruct in home and community integration to attain remaining goals. [x]  See Plan of Care  []  See progress note/recertification  []  See Discharge Summary         Progress towards goals / Updated goals:  Updated Goals: to be achieved in 4 weeks: 1.   Pt will score at least 68 on FOTO in order to facilitate return to PLOF.                PN status: PROGRESSING; 65   Assess at 30 day matthew  2.   Pt will demonstrate bilateral lower trap strength to at least 5/5 in order reduce pain with ADLs              PN status:  PROGRESSING; B 4/5   Making progress  3.   Pt will report max pain 5/10 in order to improve ability to sleep at night              PN status: PROGRESSING; max pain of 7/10, but less frequent (2-3 times per week)    Pain is steadily declining    PLAN  []  Upgrade activities as tolerated     [x]  Continue plan of care  []  Update interventions per flow sheet       []  Discharge due to:_  []  Other:_      Megan Noble, PTA, CSCS 2/8/2021  10:23 AM    Future Appointments   Date Time Provider Libia Medel   2/10/2021  9:45 AM Belén Castaneda, PTA [facility]   2/15/2021  9:00 AM Jamie Mcclellan, PT [facility]   2/17/2021  9:00 AM Belén Leonel, PTA [facility]   2/22/2021  9:00 AM Belén Leonel, PTA [facility]   2/24/2021  9:00 AM Yamil Bailon, PT [facility]

## 2021-02-10 ENCOUNTER — HOSPITAL ENCOUNTER (OUTPATIENT)
Dept: PHYSICAL THERAPY | Age: 51
Discharge: HOME | End: 2021-02-10
Payer: COMMERCIAL

## 2021-02-10 PROCEDURE — 97112 NEUROMUSCULAR REEDUCATION: CPT

## 2021-02-10 PROCEDURE — 97110 THERAPEUTIC EXERCISES: CPT

## 2021-02-10 PROCEDURE — 97530 THERAPEUTIC ACTIVITIES: CPT

## 2021-02-10 NOTE — PROGRESS NOTES
PT DAILY TREATMENT NOTE     Patient Name: Alex Matos  Date:2/10/2021  : 1970  [x]  Patient  Verified  Payor: Giovana Chappell / Plan: [place] [employer] [place] / Product Type: PPO /    In time:946  Out time:1038  Total Treatment Time (min): 52  Visit #: 4 of 8    Treatment Area: Neck pain [M54.2]    SUBJECTIVE  Pain Level (0-10 scale): 2  Any medication changes, allergies to medications, adverse drug reactions, diagnosis change, or new procedure performed?: [x] No    [] Yes (see summary sheet for update)  Subjective functional status/changes:   [] No changes reported  \"My neck hurts a little, but mostly my neck. \"    OBJECTIVE    Modality rationale: decrease pain to improve the patients ability to improve mobility and upright posture   Min Type Additional Details    [] Estim:  []Unatt       []IFC  []Premod                        []Other:  []w/ice   []w/heat  Position:  Location:    [] Estim: []Att    []TENS instruct  []NMES                    []Other:  []w/US   []w/ice   []w/heat  Position:  Location:    []  Traction: [] Cervical       []Lumbar                       [] Prone          []Supine                       []Intermittent   []Continuous Lbs:  [] before manual  [] after manual    []  Ultrasound: []Continuous   [] Pulsed                           []1MHz   []3MHz W/cm2:  Location:    []  Iontophoresis with dexamethasone         Location: [] Take home patch   [] In clinic   10 []  Ice     [x]  heat  []  Ice massage  []  Laser   []  Anodyne Position: supine with wedge   Location: neck    []  Laser with stim  []  Other:  Position:  Location:    []  Vasopneumatic Device Pressure:       [] lo [] med [] hi   Temperature: [] lo [] med [] hi   [x] Skin assessment post-treatment:  [x]intact []redness- no adverse reaction    []redness - adverse reaction:     10 min Therapeutic Exercise:  [x] See flow sheet :   Rationale: increase ROM and increase strength to improve the patients ability to perform ADLs    8 min Therapeutic Activity:  [x]  See flow sheet : functional reaching activities    Rationale: increase ROM, increase strength, improve coordination, improve balance and increase proprioception  to improve the patients ability to improve mobility and upright posture     24 min Neuromuscular Re-education:  [x]  See flow sheet : scap and postural re-ed activities     Rationale: increase ROM, increase strength, improve coordination, improve balance and increase proprioception  to improve the patients ability to improve mobility and upright posture        With   [x] TE   [x] TA   [x] neuro   [] other: Patient Education: [x] Review HEP    [] Progressed/Changed HEP based on:   [x] positioning   [x] body mechanics   [] transfers   [] heat/ice application    [] other:      Other Objective/Functional Measures:      Pain Level (0-10 scale) post treatment: 2    ASSESSMENT/Changes in Function: Pt is making steady progress with her postural awareness and scap stability. Pt continues to await script for lower back which was limiting her more today with progression of exercises. Patient will continue to benefit from skilled PT services to modify and progress therapeutic interventions, address functional mobility deficits, address ROM deficits, address strength deficits, analyze and address soft tissue restrictions, analyze and cue movement patterns, analyze and modify body mechanics/ergonomics, assess and modify postural abnormalities, address imbalance/dizziness and instruct in home and community integration to attain remaining goals. [x]  See Plan of Care  []  See progress note/recertification  []  See Discharge Summary         Progress towards goals / Updated goals:  Updated Goals: to be achieved in 4 weeks: 1.   Pt will score at least 68 on FOTO in order to facilitate return to PLOF.                KV status: PROGRESSING; 65              Assess at 30 day matthew  2.   Pt will demonstrate bilateral lower trap strength to at least 5/5 in order reduce pain with ADLs              PN status:  PROGRESSING; B 4/5              Making progress  3.   Pt will report max pain 5/10 in order to improve ability to sleep at night              PN status: PROGRESSING; max pain of 7/10, but less frequent (2-3 times per week)              Pain is steadily declining     PLAN  []  Upgrade activities as tolerated     [x]  Continue plan of care  []  Update interventions per flow sheet       []  Discharge due to:_  []  Other:_      Adam Dawson PTA, [facility] 2/10/2021  10:39 AM    Future Appointments   Date Time Provider Libia Medel   2/15/2021  9:00 AM Mike Nichols, PT [facility]   2/17/2021  9:00 AM Yuan Ziegler PTA [facility]   2/22/2021  9:00 AM Yuan Ziegler PTA [facility]   2/24/2021  9:00 AM Taisha Gomes, PT [facility]

## 2021-02-15 ENCOUNTER — APPOINTMENT (OUTPATIENT)
Dept: PHYSICAL THERAPY | Age: 51
End: 2021-02-15
Payer: COMMERCIAL

## 2021-02-16 ENCOUNTER — OFFICE VISIT (OUTPATIENT)
Dept: ORTHOPEDIC SURGERY | Age: 51
End: 2021-02-16
Payer: COMMERCIAL

## 2021-02-16 VITALS
HEIGHT: 64 IN | OXYGEN SATURATION: 94 % | SYSTOLIC BLOOD PRESSURE: 145 MMHG | TEMPERATURE: 97.8 F | DIASTOLIC BLOOD PRESSURE: 85 MMHG | HEART RATE: 88 BPM | BODY MASS INDEX: 34.15 KG/M2 | WEIGHT: 200 LBS

## 2021-02-16 DIAGNOSIS — G89.29 CHRONIC BILATERAL LOW BACK PAIN WITHOUT SCIATICA: ICD-10-CM

## 2021-02-16 DIAGNOSIS — M47.812 SPONDYLOSIS OF CERVICAL REGION WITHOUT MYELOPATHY OR RADICULOPATHY: ICD-10-CM

## 2021-02-16 DIAGNOSIS — M43.10 RETROLISTHESIS OF VERTEBRAE: ICD-10-CM

## 2021-02-16 DIAGNOSIS — R29.898 WEAKNESS OF BOTH LEGS: ICD-10-CM

## 2021-02-16 DIAGNOSIS — M47.816 LUMBAR FACET ARTHROPATHY: Primary | ICD-10-CM

## 2021-02-16 DIAGNOSIS — M54.2 NECK PAIN: ICD-10-CM

## 2021-02-16 DIAGNOSIS — M54.50 CHRONIC BILATERAL LOW BACK PAIN WITHOUT SCIATICA: ICD-10-CM

## 2021-02-16 DIAGNOSIS — M54.2 CERVICALGIA: ICD-10-CM

## 2021-02-16 PROCEDURE — 99212 OFFICE O/P EST SF 10 MIN: CPT | Performed by: PHYSICIAN ASSISTANT

## 2021-02-16 NOTE — PROGRESS NOTES
Patient: Abraham Carter                MRN: [id number]       SSN: [id number]  YOB: 1970        AGE: 45 y.o. SEX: female          PCP: Charlene Srinivasan MD  02/16/21    Chief Complaint   Patient presents with    Neck Pain    Back Pain       HISTORY:  Abraham Carter is a 45 y.o. female returns to the office following for both neck and low back pain. She has been previously seen for cervical and lumbar pathology. She was referred previously to [facility] for follow-up in care regarding her lumbar DJD with facet arthropathy. She received in early February 2021 a epidural cortisone injection under the care of Dr. Kerry Juarez from [facility] which did facilitate improving her left lower extremity radicular symptoms. Generally she reports pain improved but still notes a chronicity with a pain rating of 3 on a 10 point scale down from the previous 6-7 on a 10 point scale. Patient is following currently with physical therapy outpatient for chronic neck and upper left back pain. She also today reports weakness in her left leg which has been previously identified and is a constant chronic problem that she has and that limits her ability to stand for extended periods and walk distance. She has no recent falls reported. She denies any bowel or bladder incontinence. No saddle paresthesia or anesthesia. Regarding her cervical spine and previous cervical discomfort with the onset of physical therapy she is greatly improved with her neck discomfort however her symptoms have not fully resolved. She still has some difficulty turning her head to the left and looking downward. Pain in the upper left back is also noted when pain in the left cervical musculature is evident. This discomfort limits her ability to focus on tasks at hand due to needing to change positions or stretch on a frequent basis. 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 She resides locally in the [facility].       Pain Assessment  2/16/2021   Location of Pain Neck;Back   Location Modifiers -   Severity of Pain 7   Quality of Pain Throbbing;Aching   Quality of Pain Comment -   Duration of Pain Persistent   Frequency of Pain Constant   Aggravating Factors Bending;Stretching   Aggravating Factors Comment -   Limiting Behavior Yes   Relieving Factors Rest   Relieving Factors Comment -   Result of Injury No   Work-Related Injury -   Type of Injury -   Type of Injury Comment -           Lab Results   Component Value Date/Time    Hemoglobin A1c 6.4 (H) 08/17/2010 02:20 PM     Weight Metrics 2/16/2021 1/4/2021 12/8/2020 11/20/2020 8/25/2020 8/14/2020 7/30/2020   Weight 200 lb 205 lb 205 lb 9.6 oz 205 lb 196 lb 198 lb 199 lb 6 oz   BMI 34.33 kg/m2 35.19 kg/m2 35.29 kg/m2 35.19 kg/m2 33.64 kg/m2 33.99 kg/m2 34.22 kg/m2            Problem List Items Addressed This Visit     None      Visit Diagnoses     Lumbar facet arthropathy    -  Primary    Chronic bilateral low back pain without sciatica        Retrolisthesis of vertebrae        Neck pain        Spondylosis of cervical region without myelopathy or radiculopathy        Cervicalgia        Weakness of both legs              PAST MEDICAL HISTORY:   Past Medical History:   Diagnosis Date    Asthma     Diabetes ([address]     Hypertension     Nausea & vomiting     Pancreatitis     Thyroid disease     hypothyroidism       PAST SURGICAL HISTORY:   Past Surgical History:   Procedure Laterality Date    HX ANKLE FRACTURE TX      HX GYN      BTL hysterectomy    HX KNEE ARTHROSCOPY      right and left       ALLERGIES:   Allergies   Allergen Reactions    Ace Inhibitors Angioedema    Adhesive Hives and Itching    Adhesive Tape-Silicones Hives and Itching    Doxycycline Hives        CURRENT MEDICATIONS:  A list of medications prior to the time of admission include:  Prior to Admission medications    Medication Sig Start Date End Date Taking? Authorizing Provider   diclofenac EC (VOLTAREN) 75 mg EC tablet TAKE 1 TABLET BY MOUTH TWICE DAILY WITH MEALS 1/18/21  Yes Shar Vazquez PA-C   acetaminophen (TYLENOL) 325 mg tablet Take 2 Tabs by mouth every four (4) hours as needed for Pain. 11/19/20  Yes Ulisses Vyas PA   hydrOXYzine pamoate (VISTARIL) 25 mg capsule Take 25 mg by mouth three (3) times daily as needed. Yes Provider, Historical   triamcinolone acetonide (KENALOG) 0.025 % ointment Apply  to affected area two (2) times a day. use thin layer   Yes Provider, Historical   carisoprodoL (SOMA) 350 mg tablet every eight (8) hours as needed. 5/15/20  Yes Provider, Historical   cholecalciferol (VITAMIN D3) (5000 Units/125 mcg) tab tablet Take 5,000 Units by mouth daily. Yes Provider, Historical   Lantus Solostar U-100 Insulin 100 unit/mL (3 mL) inpn 20 Units nightly. 6/9/20  Yes Provider, Historical   HumaLOG KwikPen Insulin 200 unit/mL (3 mL) inpn 4 Units Before breakfast, lunch, and dinner. 6/9/20  Yes Provider, Historical   fluticasone propionate (FLONASE) 50 mcg/actuation nasal spray 2 Sprays by Both Nostrils route daily. 3/26/20  Yes Provider, Historical   dextroamphetamine-amphetamine (ADDERALL) 20 mg tablet 20 mg two (2) times a day. 5/20/20  Yes Provider, Historical   cyclobenzaprine (FLEXERIL) 10 mg tablet three (3) times daily as needed. 5/15/20  Yes Provider, Historical   VENTOLIN HFA 90 mcg/actuation inhaler every six (6) hours as needed. 8/30/17  Yes Provider, Historical   amLODIPine (NORVASC) 10 mg tablet 10 mg daily. 11/30/17  Yes Provider, Historical   ADVAIR DISKUS 250-50 mcg/dose diskus inhaler 1 Puff two (2) times a day. 8/30/17  Yes Provider, Historical   metFORMIN (GLUCOPHAGE) 1,000 mg tablet 1,000 mg two (2) times a day. 8/30/17  Yes Provider, Historical   metoprolol succinate (TOPROL-XL) 200 mg XL tablet 200 mg daily.  10/18/17  Yes Provider, Historical [place] Thyroid 90 mg tablet 90 mg daily. 11/15/17  Yes Provider, Historical   diclofenac (VOLTAREN) 1 % gel Apply 2 g topically to left paracervical region and left upper back. 11/20/20   Anca Vazquez PA-C   ketorolac (TORADOL) 10 mg tablet Take 1 Tab by mouth every six (6) hours as needed for Pain. 11/19/20   EFREN Le   predniSONE (STERAPRED DS) 10 mg dose pack Take  by mouth See Admin Instructions. See administration instruction per 10mg dose pack    Provider, Historical   bisacodyL 5 mg tab Take 5 mg by mouth daily. 7/30/20   Tiffany Scanlon MD   ibuprofen (MOTRIN) 800 mg tablet Take 1 Tab by mouth three (3) times daily as needed for Pain. 7/30/20   Tiffany Scanlon MD       FAMILY HISTORY:   Family History   Problem Relation Age of Onset    Breast Cancer Other 27    Breast Cancer Maternal Aunt 61    Ovarian Cancer Maternal Grandmother 36    Hypertension Mother     Diabetes Mother     Hypertension Father     Diabetes Father        SOCIAL HISTORY:   Social History     Socioeconomic History    Marital status:      Spouse name: Not on file    Number of children: Not on file    Years of education: Not on file    Highest education level: Not on file   Tobacco Use    Smoking status: Never Smoker    Smokeless tobacco: Never Used   Substance and Sexual Activity    Alcohol use: Yes     Frequency: Monthly or less     Comment: occasionally    Drug use: No       ROS:No CP, No SOB, No fever/chills nor night sweats. No headaches, vision abnormalities to include double and oral loss of vision. No hearing abnormalities. Musculoskeletal pain per HPI. Pain is exacerbated positionally. Pt denies h/o spinal surgery, injections, or PT/chiropractor. Self treated with less than adequate relief on oral antiinflammatories. . Pt denies change in bowel or bladder habits. Pt denies fever, weight loss, or skin changes.     EXAM:  Patient alert and oriented x 3,   CN II-XII grossly intact  Patient sitting on the bedside favoring her left low back, interacting with conversation with concerned affect. Breathing appears regular effortless with no visible usage of accessory muscles  Distal cap refill intact at 2/2 Adam UE / LE. Neuro intact Adam UE/LE to noxious stimuli    Ortho Specific exam:    With chaperone present Amy KOWALSKI patient examined reflecting below: With patient standing at bedside feet together midline lumbar spine reflects no lesions masses or step-offs palpable. There is no pain associated with the midline or right paraspinal/iliolumbar region. There is palpable discomfort noted in the left SI joint radiating into the left para lumbar musculature from estimated L2-S1. With patient's feet together she can forward flex touching fingertips to mid anterior tibia with some achy discomfort noted to the left iliolumbar region. No overt striking pain however. Again standing at bedside erect in a neutral position extension of the back to 10 degrees reproduces pain in the left SI joint and left iliolumbar region. With the neutral position also lateral bending to the left 20 degrees estimated into the right 15 degrees with only slight discomfort reproduced in the left iliolumbar region with right-sided bending. Patient laying supine bilateral lower extremities reveal negative straight leg raise and negative figure 4 sign. Sitting up at bedside with both knees flexed at 90 degrees DTRs for patella 1+/2 symmetrically and trace over 2 symmetrically for Achilles. Negative Babinski bilaterally. Motor strength testing hip flexors on the left noted at 4-/5 against resistance and on the right 5/5 against resistance. Quad sets strength reveals 4-/5 against resistance on the left and 5/5 on the right. There is visible atrophy of the quad complex on the left when compared to the right. Hamstring strength 5-/5 symmetrically.     Quad and femoral nerve activity full bilateral lower extremities. Cervical spine examined to reveal posterior midline no lesions mass or step-offs palpable. There is diffuse tenderness to palpation in the left paracervical musculature from the base of the occiput spanning to C7. There is also palpable tenderness in the mid to lateral superior trapezius and even in the rhomboid area associated on the left. Patient is negative for contralateral pain on palpation. Cervical flexion reveals 2 cm chin to chest deficit and extension noted at estimated 5 degrees. There is a negative Spurling's test symmetrically. DTRs for biceps triceps and brachial radialis symmetrically 1+/2. Biceps triceps strength equal at 5-/5 against resistance. X-ray: [facility], historical: Cervical images 2 view reveal loss of the lordotic curvature with multiple levels of cervical DJD spondylosis. Anterior osteophytes are also noted. Facet arthropathy is also seen spanning most prominent from C3-4 C4-5 and C5-6 as well as C6-7. No evidence of spondylolisthesis identified. No lesions or masses noted. No soft tissue calcifications. 2 view of the lumbar spine historical cervical DJD with facet arthropathy and noted lower lumbar spondylolisthesis L5S1. Impression:  1. Anterior listhesis L5-S1  2. Lumbar spondylosis multiple levels  3. Lumbar myalgia  4. Left lower extremity weakness with quad atrophy  5. Cervicalgia  6. Myalgia of the upper left back possibly associated with cervical radiculopathy MRI may be necessary patient to continue PT which has improved her symptoms overall at this point custodial through current treatment. 7.  Status post epidural injection by Dr. Xin Hernandez lumbar spine with pain improved but still noting weakness in the quad sets left lower extremity  8. Cervical spondylosis without definable radicular symptoms per exam today. 9.  Loss of cervical lordotic curvature essentially developing a straight neck  10. Obesity BMI 34.33 kg/M squared              PLAN: Today we discussed alternatives to care to include but not limited to continuation of physical therapy for her cervical spine symptoms. I am pleased to see that she is improving overall with her cervical symptoms however with her underlying loss of the lordotic curvature of the cervical spine essentially developing a straight neck and the multiple levels of cervical spondylosis with facet arthropathy a MRI may be necessary to fully assess for nerve root involvement causing her left upper back and neck discomfort. I am to hold off on ordering the MRI at this point until she finishes her course of physical therapy. If she worsens during the course of therapy then certainly an MRI will be ordered immediately. Regarding her lumbar spine I am pleased again to note that she has improved with her lower extremity left pain however still has progressive weakness despite the recent epidural.  She is going to follow-up with Dr. Aston Pond later this week. Physical therapy may be necessary but will be directed by Dr. Aston Pond attention. I further support her short-term leave of absence noting the difficulty she has with sitting for only short periods and/or walking short distances in addition to upper left back and neck pain limiting her ability to focus safely on her activity and tasks at hand. Today all of her questions were answered to her satisfaction. Copies of her x-rays again reviewed and provided. Patient provided a reminder for a \"due or due soon\" health maintenance. I have asked the patient to schedule an appointment with their primary care provider for follow-up on general health maintenance concerns. Today all the patient's questions were answered to their satisfaction. Copies of x-rays reviewed if obtained this visit, and provided to patient.           Dictation disclaimer:  Please note that this dictation was completed with Radha the computer voice recognition software. Quite often unanticipated grammatical, syntax, homophones, and other interpretive errors are inadvertently transcribed by the computer software. Please disregard these errors. Please excuse any errors that have escaped final proofreading. Rebeca HINES, APC, MPAS, PA-C  [facility] [place]

## 2021-02-17 ENCOUNTER — HOSPITAL ENCOUNTER (OUTPATIENT)
Dept: PHYSICAL THERAPY | Age: 51
Discharge: HOME | End: 2021-02-17
Payer: COMMERCIAL

## 2021-02-17 PROCEDURE — 97112 NEUROMUSCULAR REEDUCATION: CPT

## 2021-02-17 PROCEDURE — 97140 MANUAL THERAPY 1/> REGIONS: CPT

## 2021-02-17 PROCEDURE — 97530 THERAPEUTIC ACTIVITIES: CPT

## 2021-02-17 PROCEDURE — 97110 THERAPEUTIC EXERCISES: CPT

## 2021-02-17 NOTE — PROGRESS NOTES
PT DAILY TREATMENT NOTE     Patient Name: Kell Aceves  Date:2021  : 1970  [x]  Patient  Verified  Payor: Mitali Foote / Plan: [place] [employer] / Product Type: PPO /    In time:900  Out time:956  Total Treatment Time (min): 56  Visit #: 5 of 8    Treatment Area: Neck pain [M54.2]    SUBJECTIVE  Pain Level (0-10 scale): 4  Any medication changes, allergies to medications, adverse drug reactions, diagnosis change, or new procedure performed?: [x] No    [] Yes (see summary sheet for update)  Subjective functional status/changes:   [] No changes reported  \"My neck has been tight since the weekend. \"    OBJECTIVE    Modality rationale: decrease pain to improve the patients ability to improve mobility and positional tolerance   Min Type Additional Details    [] Estim:  []Unatt       []IFC  []Premod                        []Other:  []w/ice   []w/heat  Position:  Location:    [] Estim: []Att    []TENS instruct  []NMES                    []Other:  []w/US   []w/ice   []w/heat  Position:  Location:    []  Traction: [] Cervical       []Lumbar                       [] Prone          []Supine                       []Intermittent   []Continuous Lbs:  [] before manual  [] after manual    []  Ultrasound: []Continuous   [] Pulsed                           []1MHz   []3MHz W/cm2:  Location:    []  Iontophoresis with dexamethasone         Location: [] Take home patch   [] In clinic   10 []  Ice     [x]  heat  []  Ice massage  []  Laser   []  Anodyne Position: supine with wedge  Location: neck    []  Laser with stim  []  Other:  Position:  Location:    []  Vasopneumatic Device Pressure:       [] lo [] med [] hi   Temperature: [] lo [] med [] hi   [x] Skin assessment post-treatment:  [x]intact []redness- no adverse reaction    []redness - adverse reaction:     10 min Therapeutic Exercise:  [x] See flow sheet :   Rationale: increase ROM and increase strength to improve the patients ability to perform ADLs    10 min Therapeutic Activity:  [x]  See flow sheet :functional reaching   Rationale: increase ROM, increase strength, improve coordination, improve balance and increase proprioception  to improve the patients ability to improve mobility and ADL performance     18 min Neuromuscular Re-education:  [x]  See flow sheet : scap and postural re-ed activities    Rationale: increase ROM, increase strength, improve coordination, improve balance and increase proprioception  to improve the patients ability to improve mobility and upright posture    8 min Manual Therapy:  SOR, STM to c/s paraspinals and B UT/LS. Gentle   The manual therapy interventions were performed at a separate and distinct time from the therapeutic activities interventions. Rationale: decrease pain, increase ROM, increase tissue extensibility, decrease trigger points and increase postural awareness to improve mobility and positional tolerance        With   [x] TE   [x] TA   [x] neuro   [] other: Patient Education: [x] Review HEP    [] Progressed/Changed HEP based on:   [x] positioning   [x] body mechanics   [] transfers   [] heat/ice application    [] other:      Other Objective/Functional Measures:      Pain Level (0-10 scale) post treatment: 2    ASSESSMENT/Changes in Function: Pt was more guarded today than in her past few visits. She reports an increase in left sided neck stiffness since the weekend. Pt reports a reduction in pain following manual and modalities. Pt has a new referral for LBP which she will wait to finish up her current POC for her neck prior to evaluation for LBP as this is from a different physician.     Patient will continue to benefit from skilled PT services to modify and progress therapeutic interventions, address functional mobility deficits, address ROM deficits, address strength deficits, analyze and address soft tissue restrictions, analyze and cue movement patterns, analyze and modify body mechanics/ergonomics, assess and modify postural abnormalities, address imbalance/dizziness and instruct in home and community integration to attain remaining goals. [x]  See Plan of Care  []  See progress note/recertification  []  See Discharge Summary         Progress towards goals / Updated goals: 1.   Pt will score at least 68 on FOTO in order to facilitate return to PLOF.                JONES status: PROGRESSING; 64              Assess at 30 day matthew  2.   Pt will demonstrate bilateral lower trap strength to at least 5/5 in order reduce pain with ADLs              PN status:  PROGRESSING; B 4/5              Making progress  3.   Pt will report max pain 5/10 in order to improve ability to sleep at night              PN status: PROGRESSING; max pain of 7/10, but less frequent (2-3 times per week)              Pain is steadily declining    PLAN  []  Upgrade activities as tolerated     [x]  Continue plan of care  []  Update interventions per flow sheet       []  Discharge due to:_  []  Other:_      Henrietta Current, PTA, CSCS 2/17/2021  9:57 AM    Future Appointments   Date Time Provider Libia Westoni   2/19/2021  1:30 PM [facility]   2/22/2021  9:00 AM Rosalia Kerr PTA [facility]   2/24/2021  9:00 AM Walter Green, PT [facility]   3/10/2021 10:15 AM Mega Vazquez PA-C [facility] AM[facility]

## 2021-02-18 RX ORDER — DICLOFENAC SODIUM 75 MG/1
TABLET, DELAYED RELEASE ORAL
Qty: 60 TAB | Refills: 0 | Status: SHIPPED | OUTPATIENT
Start: 2021-02-18 | End: 2021-03-25

## 2021-02-19 ENCOUNTER — HOSPITAL ENCOUNTER (OUTPATIENT)
Dept: PHYSICAL THERAPY | Age: 51
Discharge: HOME | End: 2021-02-19
Payer: COMMERCIAL

## 2021-02-19 PROCEDURE — 97112 NEUROMUSCULAR REEDUCATION: CPT | Performed by: GENERAL ACUTE CARE HOSPITAL

## 2021-02-19 PROCEDURE — 97530 THERAPEUTIC ACTIVITIES: CPT | Performed by: GENERAL ACUTE CARE HOSPITAL

## 2021-02-19 PROCEDURE — 97110 THERAPEUTIC EXERCISES: CPT | Performed by: GENERAL ACUTE CARE HOSPITAL

## 2021-02-19 NOTE — PROGRESS NOTES
PT DAILY TREATMENT NOTE     Patient Name: Terrel Barthel  Date:2021  : 1970  [x]  Patient  Verified  Payor: Hanna Serrato / Plan: [place] [employer] [place] / Product Type: PPO /    In time: 1:30  Out time: 2:25  Total Treatment Time (min): 55  Visit #: 6 of 8    Treatment Area: Neck pain [M54.2]    SUBJECTIVE  Pain Level (0-10 scale): 5/10  Any medication changes, allergies to medications, adverse drug reactions, diagnosis change, or new procedure performed?: [x] No    [] Yes (see summary sheet for update)  Subjective functional status/changes:   [] No changes reported  \"My neck has been tight and I had neck spasms last night. I tried stretches and heat, but I ended up having to take a percocet. \"  Pt saw MD this morning whom is requesting an MRI for cervical spine. Plans to have surgery on lumbar spine- date TBD.      OBJECTIVE    Modality rationale: decrease pain to improve the patients ability to improve mobility and positional tolerance   Min Type Additional Details    [] Estim:  []Unatt       []IFC  []Premod                        []Other:  []w/ice   []w/heat  Position:  Location:    [] Estim: []Att    []TENS instruct  []NMES                    []Other:  []w/US   []w/ice   []w/heat  Position:  Location:    []  Traction: [] Cervical       []Lumbar                       [] Prone          []Supine                       []Intermittent   []Continuous Lbs:  [] before manual  [] after manual    []  Ultrasound: []Continuous   [] Pulsed                           []1MHz   []3MHz W/cm2:  Location:    []  Iontophoresis with dexamethasone         Location: [] Take home patch   [] In clinic   10 []  Ice     [x]  heat  []  Ice massage  []  Laser   []  Anodyne Position: supine with wedge  Location: neck    []  Laser with stim  []  Other:  Position:  Location:    []  Vasopneumatic Device Pressure:       [] lo [] med [] hi   Temperature: [] lo [] med [] hi   [x] Skin assessment post-treatment:  [x]intact []redness- no adverse reaction    []redness - adverse reaction:     15 min Therapeutic Exercise:  [x] See flow sheet :   Rationale: increase ROM and increase strength to improve the patients ability to perform ADLs    10 min Therapeutic Activity:  [x]  See flow sheet :functional reaching   Rationale: increase ROM, increase strength, improve coordination, improve balance and increase proprioception  to improve the patients ability to improve mobility and ADL performance     20 min Neuromuscular Re-education:  [x]  See flow sheet : scap and postural re-ed activities    Rationale: increase ROM, increase strength, improve coordination, improve balance and increase proprioception  to improve the patients ability to improve mobility and upright posture    NI min Manual Therapy:  SOR, STM to c/s paraspinals and B UT/LS. Gentle   The manual therapy interventions were performed at a separate and distinct time from the therapeutic activities interventions. Rationale: decrease pain, increase ROM, increase tissue extensibility, decrease trigger points and increase postural awareness to improve mobility and positional tolerance        With   [x] TE   [x] TA   [x] neuro   [] other: Patient Education: [x] Review HEP    [] Progressed/Changed HEP based on:   [x] positioning   [x] body mechanics   [] transfers   [] heat/ice application    [] other:      Other Objective/Functional Measures:      Pain Level (0-10 scale) post treatment: 2     ASSESSMENT/Changes in Function: Pt able to tolerate all exercises today, despite increased reports of pain today. Observable wincing during some exercises, VC's to limit to a pain free range. Noted increased guarding during cervical AROM, secondary to pain. Pt is limited by low back pain and L LE pain with certain standing and TRX exercises, but very motivated and pushes herself.      Patient will continue to benefit from skilled PT services to modify and progress therapeutic interventions, address functional mobility deficits, address ROM deficits, address strength deficits, analyze and address soft tissue restrictions, analyze and cue movement patterns, analyze and modify body mechanics/ergonomics, assess and modify postural abnormalities, address imbalance/dizziness and instruct in home and community integration to attain remaining goals. [x]  See Plan of Care  []  See progress note/recertification  []  See Discharge Summary         Progress towards goals / Updated goals: 1.   Pt will score at least 68 on FOTO in order to facilitate return to PLOF.                JM status: PROGRESSING; 64              Assess at 30 day matthew  2.   Pt will demonstrate bilateral lower trap strength to at least 5/5 in order reduce pain with ADLs              PN status:  PROGRESSING; B 4/5              Making progress  3.   Pt will report max pain 5/10 in order to improve ability to sleep at night              PN status: PROGRESSING; max pain of 7/10, but less frequent (2-3 times per week)              Pain is steadily declining    PLAN  []  Upgrade activities as tolerated     [x]  Continue plan of care  []  Update interventions per flow sheet       []  Discharge due to:_  []  Other:_      Amanda Licea, PT   2/19/2021  9:57 AM    Future Appointments   Date Time Provider Libia Medel   2/19/2021  1:30 PM [facility] [address] [facility]   2/22/2021  9:00 AM Keli Damon, PTA [facility]   2/24/2021  9:00 AM Elliott Valencia, PT [facility]   3/10/2021 10:15 AM Jax Vazquez PA-C [facility]

## 2021-02-22 ENCOUNTER — HOSPITAL ENCOUNTER (OUTPATIENT)
Dept: PHYSICAL THERAPY | Age: 51
Discharge: HOME | End: 2021-02-22
Payer: COMMERCIAL

## 2021-02-22 PROCEDURE — 97110 THERAPEUTIC EXERCISES: CPT

## 2021-02-22 PROCEDURE — 97530 THERAPEUTIC ACTIVITIES: CPT

## 2021-02-22 PROCEDURE — 97112 NEUROMUSCULAR REEDUCATION: CPT

## 2021-02-22 NOTE — PROGRESS NOTES
PT DAILY TREATMENT NOTE     Patient Name: Terrel Barthel  Date:2021  : 1970  [x]  Patient  Verified  Payor: Hanna Serrato / Plan: [place] [employer] [place] / Product Type: PPO /    In time:900  Out time:949  Total Treatment Time (min): 49  Visit #: 7 of 8    Treatment Area: Neck pain [M54.2]    SUBJECTIVE  Pain Level (0-10 scale): 1  Any medication changes, allergies to medications, adverse drug reactions, diagnosis change, or new procedure performed?: [x] No    [] Yes (see summary sheet for update)  Subjective functional status/changes:   [] No changes reported  \"My neck feels good, but my back hurts. \"    OBJECTIVE    Modality rationale: decrease pain to improve the patients ability to improve mobility and positional tolerance   Min Type Additional Details    [] Estim:  []Unatt       []IFC  []Premod                        []Other:  []w/ice   []w/heat  Position:  Location:    [] Estim: []Att    []TENS instruct  []NMES                    []Other:  []w/US   []w/ice   []w/heat  Position:  Location:    []  Traction: [] Cervical       []Lumbar                       [] Prone          []Supine                       []Intermittent   []Continuous Lbs:  [] before manual  [] after manual    []  Ultrasound: []Continuous   [] Pulsed                           []1MHz   []3MHz W/cm2:  Location:    []  Iontophoresis with dexamethasone         Location: [] Take home patch   [] In clinic   10 []  Ice     [x]  heat  []  Ice massage  []  Laser   []  Anodyne Position: supine with wedge  Location: neck    []  Laser with stim  []  Other:  Position:  Location:    []  Vasopneumatic Device Pressure:       [] lo [] med [] hi   Temperature: [] lo [] med [] hi   [x] Skin assessment post-treatment:  [x]intact []redness- no adverse reaction    []redness - adverse reaction:     10 min Therapeutic Exercise:  [x] See flow sheet :   Rationale: increase ROM and increase strength to improve the patients ability to perform ADLs    10 min Therapeutic Activity:  [x]  See flow sheet : functional reaching   Rationale: increase ROM, increase strength, improve coordination, improve balance and increase proprioception  to improve the patients ability to improve mobility and ADL performance     19 min Neuromuscular Re-education:  [x]  See flow sheet : scap and postural re-ed activities   Rationale: increase ROM, increase strength, improve coordination, improve balance and increase proprioception  to improve the patients ability to improve mobility and reaching        With   [x] TE   [x] TA   [x] neuro   [] other: Patient Education: [x] Review HEP    [] Progressed/Changed HEP based on:   [x] positioning   [x] body mechanics   [] transfers   [] heat/ice application    [] other:      Other Objective/Functional Measures:      Pain Level (0-10 scale) post treatment: 0    ASSESSMENT/Changes in Function: Pt is making progress with neck pain, but continues to be limited with her back pain. She states that she is going to be having a spinal fusion, but is awaiting date from surgeon. We will plan to discharge her neck at [place]. Patient will continue to benefit from skilled PT services to modify and progress therapeutic interventions, address functional mobility deficits, address ROM deficits, address strength deficits, analyze and address soft tissue restrictions, analyze and cue movement patterns, analyze and modify body mechanics/ergonomics, assess and modify postural abnormalities, address imbalance/dizziness and instruct in home and community integration to attain remaining goals. [x]  See Plan of Care  []  See progress note/recertification  []  See Discharge Summary         Progress towards goals / Updated goals: 1.   Pt will score at least 68 on FOTO in order to facilitate return to PLOF.                PN status: PROGRESSING; 64              Assess at 30 day matthew  2.   Pt will demonstrate bilateral lower trap strength to at least 5/5 in order reduce pain with ADLs              PN status: [address]; B 4/5              Making progress  3.   Pt will report max pain 5/10 in order to improve ability to sleep at night              PN status: PROGRESSING; max pain of 7/10, but less frequent (2-3 times per week)              Pain is steadily declining    PLAN  []  Upgrade activities as tolerated     [x]  Continue plan of care  []  Update interventions per flow sheet       []  Discharge due to:_  []  Other:_      Yoseph Whitehead PTA, CSCS 2/22/2021  9:54 AM    Future Appointments   Date Time Provider Libia Medel   2/24/2021  9:00 AM [facility]   3/10/2021 10:15 AM Sandro Vazquez PA-C [facility]

## 2021-02-24 ENCOUNTER — HOSPITAL ENCOUNTER (OUTPATIENT)
Dept: PHYSICAL THERAPY | Age: 51
Discharge: HOME | End: 2021-02-24
Payer: COMMERCIAL

## 2021-02-24 PROCEDURE — 97530 THERAPEUTIC ACTIVITIES: CPT

## 2021-02-24 PROCEDURE — 97110 THERAPEUTIC EXERCISES: CPT

## 2021-02-24 NOTE — PROGRESS NOTES
PT DISCHARGE DAILY NOTE AND [id number]    Patient name: Bruna Albarado Start of Care: 2020   Referral source: Mega Vazquez PA-C : 1970                Medical Diagnosis: Neck pain [M54.2]  Payor: Alissa Evans / Plan: [employer] PPO / Product Type: PPO /  Onset Date:2020                Treatment Diagnosis: neck pain   Prior Hospitalization: see medical history Provider#: [id number]   Medications: Verified on Patient summary List    Comorbidities: history of LBP   Prior Level of Function: functionally (I); works sedentary job at a desk  Visits from [facility]: 16    Missed Visits: 1  Reporting Period : 2021 to 2021    Date:2021  : 1970  [x]  Patient  Verified  Payor: Alissa Evans / Plan: Jamaal Hanley PPO / Product Type: PPO /    In time: 9:06  Out time: 9:35  Total Treatment Time (min): 29  Visit #: 8 of 8    SUBJECTIVE  Pain Level (0-10 scale): 2  Any medication changes, allergies to medications, adverse drug reactions, diagnosis change, or new procedure performed?: [x] No    [] Yes (see summary sheet for update)  Subjective functional status/changes:   [] No changes reported  Pt reports having some soreness in the middle of the neck today. OBJECTIVE    17 min Therapeutic Exercise:  [] See flow sheet : HEP instructions and demonstration, d/c instructions   Rationale: increase ROM and increase strength to improve the patients ability to tolerate ADLs. 12 min Therapeutic Activity:  []  See flow sheet : goal assessment, FOTO with pt   Rationale: increase ROM and increase strength  to improve the patients ability to tolerate functional activities. With   [] TE   [] TA   [] neuro   [] other: Patient Education: [x] Review HEP    [] Progressed/Changed HEP based on:   [] positioning   [] body mechanics   [] transfers   [] heat/ice application    [] other:      Other Objective/Functional Measures: See goals below.      Functional Gains: mobility and strength, less constant numbness/tingling in the hand, less tightness in the neck, pain. Functional Deficits: pain is still present, reading/computer work for an extended amount of time. Pain        Best: 0/10     Worst: 4-5/10     Pain Level (0-10 scale) post treatment: 2    Summary of Care:  Goal: Pt will score at least 68 on FOTO in order to facilitate return to PLOF. Status at last note/certification: not met, 63 points  Status at discharge: not met    Goal: Pt will demonstrate bilateral lower trap strength to at least 5/5 in order reduce pain with ADLs  Status at last note/certification: not met, B 4/5  Status at discharge: not met    Goal: Pt will report max pain 5/10 in order to improve ability to sleep at night  Status at last note/certification: MET, 8-2/95 pain  Status at discharge: met    ASSESSMENT/Changes in Function:   Pt was seen for 16 therapy sessions. Pt demonstrated/reported improvements in mobility, strength, numbness, tightness, and pain since starting therapy. She reports that the numbness is less consistent in nature but she continues to have some pain in the neck. Pt given updated HEP to perform. Pt reports she is planning on having spinal fusion surgery but is awaiting a date from the surgeon. Pt is d/c'ed from therapy at this time to HEP secondary to awaiting surgery and ability to independently perform HEP to manage current deficits for her neck.      Thank you for this referral!      PLAN  [x]Discontinue therapy: [x]Patient has reached or is progressing toward set goals      []Patient is non-compliant or has abdicated      []Due to lack of appreciable progress towards set goals    Maximilian Gill, PT 2/24/2021  9:35 AM

## 2021-02-24 NOTE — PROGRESS NOTES
Physical Therapy Discharge Instructions      [facility] [address]  [address] [facility] [facility], [address] [phone] fax      Patient: Abraham Carter  : 1970      Continue Home Exercise Program 3-4 times per week.        Continue with    [x] Heat as needed, 15-20 minutes           Follow up with MD:  As needed      Recommendations:     [x]   Return to activity with home program      Nubia Hagen, PT 2021 9:26 AM

## 2021-03-10 ENCOUNTER — OFFICE VISIT (OUTPATIENT)
Dept: ORTHOPEDIC SURGERY | Age: 51
End: 2021-03-10
Payer: COMMERCIAL

## 2021-03-10 VITALS
OXYGEN SATURATION: 99 % | HEART RATE: 81 BPM | DIASTOLIC BLOOD PRESSURE: 82 MMHG | RESPIRATION RATE: 14 BRPM | HEIGHT: 64 IN | SYSTOLIC BLOOD PRESSURE: 132 MMHG | BODY MASS INDEX: 33.8 KG/M2 | WEIGHT: 198 LBS | TEMPERATURE: 97.7 F

## 2021-03-10 DIAGNOSIS — R29.898 WEAKNESS OF BOTH LEGS: Primary | ICD-10-CM

## 2021-03-10 PROCEDURE — 99212 OFFICE O/P EST SF 10 MIN: CPT | Performed by: PHYSICIAN ASSISTANT

## 2021-03-10 NOTE — PROGRESS NOTES
Patient: Daya Lizama                MRN: [id number]       SSN: [id number]  YOB: 1970        AGE: 45 y.o. SEX: female           To whom it may concern: The below note should reflect the above patients physical exam in February 2021. There was a previous exam performed on December 08, 2020 for a short-term disability request.  There were errors in the note from December 08 2020. Please note that the below note reflects changes to the original encounter on December 8, 2020 and this note should be utilized to help determine short-term disability. Further as a result of the MRI findings from my previous order and completion December 15, 2020 reflected L5-S1 facet arthropathy with no stenosis but evident spondylolisthesis. Further patient was seen by Dr. Alfred Vasquez through [facility] and a recommendation for lumbar fusion has been made. Surgery is scheduled for the latter part of March 2021. Based on the findings of my previous exam dated December 2020 with incorrect findings attributed to dragon dictation software I asked respectfully that my note from 2/16/2021 replace the prior exam findings. With deficits noted in the muscle strength and pain I again request patient be reconsidered for short-term disability. Please note the below note reflects 2/16/2021 exam/patient visit. PCP: Naldo Partida MD      Chief Complaint   Patient presents with    Neck Pain       HISTORY:  Daya Lizama is a 45 y.o. female returns to the office following for both neck and low back pain. She has been previously seen for cervical and lumbar pathology. She was referred previously to [facility] for follow-up in care regarding her lumbar DJD with facet arthropathy. She received in early February 2021 a epidural cortisone injection under the care of Dr. Alfred Vasquez from [facility] which did facilitate improving her left lower extremity radicular symptoms.   Generally she reports pain improved but still notes a chronicity with a pain rating of 3 on a 10 point scale down from the previous 6-7 on a 10 point scale. Patient is following currently with physical therapy outpatient for chronic neck and upper left back pain. She also today reports weakness in her left leg which has been previously identified and is a constant chronic problem that she has and that limits her ability to stand for extended periods and walk distance. She has no recent falls reported. She denies any bowel or bladder incontinence. No saddle paresthesia or anesthesia. Regarding her cervical spine and previous cervical discomfort with the onset of physical therapy she is greatly improved with her neck discomfort however her symptoms have not fully resolved. She still has some difficulty turning her head to the left and looking downward. Pain in the upper left back is also noted when pain in the left cervical musculature is evident. This discomfort limits her ability to focus on tasks at hand due to needing to change positions or stretch on a frequent basis. 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 She resides locally in the [facility]. Pain Assessment  3/10/2021   Location of Pain Neck   Location Modifiers -   Severity of Pain 7   Quality of Pain Aching; Other (Comment)   Quality of Pain Comment Numbness in arm   Duration of Pain Persistent   Frequency of Pain Constant   Aggravating Factors Other (Comment)   Aggravating Factors Comment Lifting   Limiting Behavior No   Relieving Factors Rest;Other (Comment)   Relieving Factors Comment pain meds   Result of Injury No   Work-Related Injury -   Type of Injury -   Type of Injury Comment -           Lab Results   Component Value Date/Time    Hemoglobin A1c 6.4 (H) 08/17/2010 02:20 PM     Weight Metrics 3/10/2021 2/16/2021 1/4/2021 12/8/2020 11/20/2020 8/25/2020 8/14/2020   Weight 198 lb 200 lb 205 lb 205 lb 9.6 oz 205 lb 196 lb 198 lb   BMI 33.99 kg/m2 34.33 kg/m2 35.19 kg/m2 35.29 kg/m2 35.19 kg/m2 33.64 kg/m2 33.99 kg/m2            Problem List Items Addressed This Visit     None          PAST MEDICAL HISTORY:   Past Medical History:   Diagnosis Date    Asthma     Diabetes ([address]     Hypertension     Nausea & vomiting     Pancreatitis     Thyroid disease     hypothyroidism       PAST SURGICAL HISTORY:   Past Surgical History:   Procedure Laterality Date    HX ANKLE FRACTURE TX      HX GYN      BTL hysterectomy    HX KNEE ARTHROSCOPY      right and left       ALLERGIES:   Allergies   Allergen Reactions    Ace Inhibitors Angioedema    Adhesive Hives and Itching    Adhesive Tape-Silicones Hives and Itching    Doxycycline Hives        CURRENT MEDICATIONS:  A list of medications prior to the time of admission include:  Prior to Admission medications    Medication Sig Start Date End Date Taking? Authorizing Provider   diclofenac EC (VOLTAREN) 75 mg EC tablet TAKE 1 TABLET BY MOUTH TWICE DAILY WITH MEALS 2/18/21   Jg Vazquez PA-C   diclofenac (VOLTAREN) 1 % gel Apply 2 g topically to left paracervical region and left upper back. 11/20/20   Margie Vazquez PA-C   ketorolac (TORADOL) 10 mg tablet Take 1 Tab by mouth every six (6) hours as needed for Pain. 11/19/20   EFREN Bejarano   acetaminophen (TYLENOL) 325 mg tablet Take 2 Tabs by mouth every four (4) hours as needed for Pain. 11/19/20   EFREN Bejarano   hydrOXYzine pamoate (VISTARIL) 25 mg capsule Take 25 mg by mouth three (3) times daily as needed. Provider, Historical   predniSONE (STERAPRED DS) 10 mg dose pack Take  by mouth See Admin Instructions. See administration instruction per 10mg dose pack    Provider, Historical   triamcinolone acetonide (KENALOG) 0.025 % ointment Apply  to affected area two (2) times a day.  use thin layer    Provider, Historical   bisacodyL 5 mg tab Take 5 mg by mouth daily. 7/30/20   Vivian Chavez MD   ibuprofen (MOTRIN) 800 mg tablet Take 1 Tab by mouth three (3) times daily as needed for Pain. 7/30/20   Vivian Chavez MD   carisoprodoL (SOMA) 350 mg tablet every eight (8) hours as needed. 5/15/20   Provider, Historical   cholecalciferol (VITAMIN D3) (5000 Units/125 mcg) tab tablet Take 5,000 Units by mouth daily. Provider, Historical   Lantus Solostar U-100 Insulin 100 unit/mL (3 mL) inpn 20 Units nightly. 6/9/20   Provider, Historical   HumaLOG KwikPen Insulin 200 unit/mL (3 mL) inpn 4 Units Before breakfast, lunch, and dinner. 6/9/20   Provider, Historical   fluticasone propionate (FLONASE) 50 mcg/actuation nasal spray 2 Sprays by Both Nostrils route daily. 3/26/20   Provider, Historical   dextroamphetamine-amphetamine (ADDERALL) 20 mg tablet 20 mg two (2) times a day. 5/20/20   Provider, Historical   cyclobenzaprine (FLEXERIL) 10 mg tablet three (3) times daily as needed. 5/15/20   Provider, Historical   VENTOLIN HFA 90 mcg/actuation inhaler every six (6) hours as needed. 8/30/17   Provider, Historical   amLODIPine (NORVASC) 10 mg tablet 10 mg daily. 11/30/17   Provider, Historical   ADVAIR DISKUS 250-50 mcg/dose diskus inhaler 1 Puff two (2) times a day. 8/30/17   Provider, Historical   metFORMIN (GLUCOPHAGE) 1,000 mg tablet 1,000 mg two (2) times a day. 8/30/17   Provider, Historical   metoprolol succinate (TOPROL-XL) 200 mg XL tablet 200 mg daily. 10/18/17   Provider, Historical   Ranchita Thyroid 90 mg tablet 90 mg daily.  11/15/17   Provider, Historical       FAMILY HISTORY:   Family History   Problem Relation Age of Onset    Breast Cancer Other 27    Breast Cancer Maternal Aunt 61    Ovarian Cancer Maternal Grandmother 36    Hypertension Mother     Diabetes Mother     Hypertension Father     Diabetes Father        SOCIAL HISTORY:   Social History     Socioeconomic History    Marital status:      Spouse name: Not on file    Number of children: Not on file    Years of education: Not on file    Highest education level: Not on file   Tobacco Use    Smoking status: Never Smoker    Smokeless tobacco: Never Used   Substance and Sexual Activity    Alcohol use: Yes     Frequency: Monthly or less     Comment: occasionally    Drug use: No       ROS:No CP, No SOB, No fever/chills nor night sweats. No headaches, vision abnormalities to include double and oral loss of vision. No hearing abnormalities. Musculoskeletal pain per HPI. Pain is exacerbated positionally. Pt denies h/o spinal surgery, injections, or PT/chiropractor. Self treated with less than adequate relief on oral antiinflammatories. . Pt denies change in bowel or bladder habits. Pt denies fever, weight loss, or skin changes. EXAM:  Patient alert and oriented x 3,   CN II-XII grossly intact  Patient sitting on the bedside favoring her left low back, interacting with conversation with concerned affect. Breathing appears regular effortless with no visible usage of accessory muscles  Distal cap refill intact at 2/2 Adam UE / LE. Neuro intact Adam UE/LE to noxious stimuli    Ortho Specific exam:    With chaperone present Amy KOWALSKI patient examined reflecting below: With patient standing at bedside feet together midline lumbar spine reflects no lesions masses or step-offs palpable. There is no pain associated with the midline or right paraspinal/iliolumbar region. There is palpable discomfort noted in the left SI joint radiating into the left para lumbar musculature from estimated L2-S1. With patient's feet together she can forward flex touching fingertips to mid anterior tibia with some achy discomfort noted to the left iliolumbar region. No overt striking pain however. Again standing at bedside erect in a neutral position extension of the back to 10 degrees reproduces pain in the left SI joint and left iliolumbar region.   With the neutral position also lateral bending to the left 20 degrees estimated into the right 15 degrees with only slight discomfort reproduced in the left iliolumbar region with right-sided bending. Patient laying supine bilateral lower extremities reveal negative straight leg raise and negative figure 4 sign. Sitting up at bedside with both knees flexed at 90 degrees DTRs for patella 1+/2 symmetrically and trace over 2 symmetrically for Achilles. Negative Babinski bilaterally. Motor strength testing hip flexors on the left noted at 4-/5 against resistance and on the right 5/5 against resistance. Quad sets strength reveals 4-/5 against resistance on the left and 5/5 on the right. There is visible atrophy of the quad complex on the left when compared to the right. Hamstring strength 5-/5 symmetrically. Quad and femoral nerve activity full bilateral lower extremities. Cervical spine examined to reveal posterior midline no lesions mass or step-offs palpable. There is diffuse tenderness to palpation in the left paracervical musculature from the base of the occiput spanning to C7. There is also palpable tenderness in the mid to lateral superior trapezius and even in the rhomboid area associated on the left. Patient is negative for contralateral pain on palpation. Cervical flexion reveals 2 cm chin to chest deficit and extension noted at estimated 5 degrees. There is a negative Spurling's test symmetrically. DTRs for biceps triceps and brachial radialis symmetrically 1+/2. Biceps triceps strength equal at 5-/5 against resistance. X-ray: [facility], historical: Cervical images 2 view reveal loss of the lordotic curvature with multiple levels of cervical DJD spondylosis. Anterior osteophytes are also noted. Facet arthropathy is also seen spanning most prominent from C3-4 C4-5 and C5-6 as well as C6-7. No evidence of spondylolisthesis identified. No lesions or masses noted.   No soft tissue calcifications. 2 view of the lumbar spine historical cervical DJD with facet arthropathy and noted lower lumbar spondylolisthesis L5S1. Impression:  1. Anterior listhesis L5-S1  2. Lumbar spondylosis multiple levels  3. Lumbar myalgia  4. Left lower extremity weakness with quad atrophy  5. Cervicalgia  6. Myalgia of the upper left back possibly associated with cervical radiculopathy MRI may be necessary patient to continue PT which has improved her symptoms overall at this point intermediate through current treatment. 7.  Status post epidural injection by Dr. Diane Stein lumbar spine with pain improved but still noting weakness in the quad sets left lower extremity  8. Cervical spondylosis without definable radicular symptoms per exam today. 9.  Loss of cervical lordotic curvature essentially developing a straight neck  10. Obesity BMI 34.33 kg/M squared              PLAN: Today we discussed alternatives to care to include but not limited to continuation of physical therapy for her cervical spine symptoms. I am pleased to see that she is improving overall with her cervical symptoms however with her underlying loss of the lordotic curvature of the cervical spine essentially developing a straight neck and the multiple levels of cervical spondylosis with facet arthropathy a MRI may be necessary to fully assess for nerve root involvement causing her left upper back and neck discomfort. I am to hold off on ordering the MRI at this point until she finishes her course of physical therapy. If she worsens during the course of therapy then certainly an MRI will be ordered immediately. Regarding her lumbar spine I am pleased again to note that she has improved with her lower extremity left pain however still has progressive weakness despite the recent epidural.  She is going to follow-up with Dr. Diane Stein later this week. Physical therapy may be necessary but will be directed by Dr. Diane Stein attention.    I further support her short-term leave of absence noting the difficulty she has with sitting for only short periods and/or walking short distances in addition to upper left back and neck pain limiting her ability to focus safely on her activity and tasks at hand. Today all of her questions were answered to her satisfaction. Copies of her x-rays again reviewed and provided. Patient provided a reminder for a \"due or due soon\" health maintenance. I have asked the patient to schedule an appointment with their primary care provider for follow-up on general health maintenance concerns. Today all the patient's questions were answered to their satisfaction. Copies of x-rays reviewed if obtained this visit, and provided to patient. Dictation disclaimer:  Please note that this dictation was completed with [employer], the computer voice recognition software. Quite often unanticipated grammatical, syntax, homophones, and other interpretive errors are inadvertently transcribed by the computer software. Please disregard these errors. Please excuse any errors that have escaped final proofreading. Sariah Vazquez  APA, APC, MPAS, PA-C  [facility]

## 2021-03-25 RX ORDER — DICLOFENAC SODIUM 75 MG/1
TABLET, DELAYED RELEASE ORAL
Qty: 60 TAB | Refills: 0 | Status: SHIPPED | OUTPATIENT
Start: 2021-03-25

## 2021-05-24 ENCOUNTER — APPOINTMENT (OUTPATIENT)
Dept: PHYSICAL THERAPY | Age: 51
End: 2021-05-24

## 2022-03-19 PROBLEM — K42.9 UMBILICAL HERNIA WITHOUT OBSTRUCTION AND WITHOUT GANGRENE: Status: ACTIVE | Noted: 2020-07-30

## 2022-03-19 PROBLEM — E66.01 SEVERE OBESITY (HCC): Status: ACTIVE | Noted: 2020-07-02

## 2022-03-19 PROBLEM — K82.8 BILIARY DYSKINESIA: Status: ACTIVE | Noted: 2020-07-30

## 2023-05-02 ENCOUNTER — HOSPITAL ENCOUNTER (OUTPATIENT)
Facility: HOSPITAL | Age: 53
Setting detail: SPECIMEN
Discharge: HOME | End: 2023-05-05
Payer: OTHER GOVERNMENT

## 2023-05-02 ENCOUNTER — OFFICE VISIT (OUTPATIENT)
Age: 53
End: 2023-05-02

## 2023-05-02 VITALS
RESPIRATION RATE: 18 BRPM | SYSTOLIC BLOOD PRESSURE: 134 MMHG | WEIGHT: 209 LBS | HEIGHT: 62 IN | HEART RATE: 86 BPM | OXYGEN SATURATION: 98 % | BODY MASS INDEX: 38.46 KG/M2 | DIASTOLIC BLOOD PRESSURE: 80 MMHG | TEMPERATURE: 98 F

## 2023-05-02 DIAGNOSIS — J45.909 ASTHMA, UNSPECIFIED ASTHMA SEVERITY, UNSPECIFIED WHETHER COMPLICATED, UNSPECIFIED WHETHER PERSISTENT: ICD-10-CM

## 2023-05-02 DIAGNOSIS — E66.01 SEVERE OBESITY (HCC): Primary | ICD-10-CM

## 2023-05-02 DIAGNOSIS — E03.9 HYPOTHYROIDISM, UNSPECIFIED TYPE: ICD-10-CM

## 2023-05-02 DIAGNOSIS — I10 HYPERTENSION, UNSPECIFIED TYPE: ICD-10-CM

## 2023-05-02 PROBLEM — M16.10 PRIMARY LOCALIZED OSTEOARTHRITIS OF PELVIC REGION AND THIGH: Status: ACTIVE | Noted: 2021-09-07

## 2023-05-02 PROBLEM — M25.561 ARTHRALGIA OF BOTH KNEES: Status: ACTIVE | Noted: 2023-05-02

## 2023-05-02 PROBLEM — M25.562 ARTHRALGIA OF BOTH KNEES: Status: ACTIVE | Noted: 2023-05-02

## 2023-05-02 PROCEDURE — 83013 H PYLORI (C-13) BREATH: CPT

## 2023-05-02 RX ORDER — PIOGLITAZONEHYDROCHLORIDE 15 MG/1
TABLET ORAL
COMMUNITY
Start: 2023-02-22

## 2023-05-02 RX ORDER — CELECOXIB 200 MG/1
200 CAPSULE ORAL DAILY
COMMUNITY

## 2023-05-02 RX ORDER — LEVOTHYROXINE AND LIOTHYRONINE 57; 13.5 UG/1; UG/1
90 TABLET ORAL DAILY
COMMUNITY

## 2023-05-02 RX ORDER — BACLOFEN 10 MG/1
TABLET ORAL
COMMUNITY

## 2023-05-02 NOTE — PROGRESS NOTES
Chief Complaint   Patient presents with    Surgical Consult     Confirmed video    Pt ID confirmed    Ambulatory Bariatric Summary 5/2/2023 3/10/2021 9/6/6603   Systolic [phone]   Diastolic 86 82 88   Pulse - 81 84   Temp 98 - -   Resp 18 - -   Weight 209 198 205   Height 62 64 64   BMI 38.3 kg/m2 34.1 kg/m2 35.3 kg/m2       Body mass index is 38.23 kg/m².

## 2023-05-02 NOTE — PROGRESS NOTES
Bariatric Surgery Consultation    CC: Consultation regarding surgical treatment options for morbid obesity and related comorbidities  Subjective: The patient is a 47 y.o. obese  female with a Body mass index is 38.23 kg/m². . They have been considering surgery for some time now. The patient presents to the clinic today to discuss surgical weight loss options. They have made multiple attempts at weight loss over the years without success. They have tried low-carb, low calorie, high-protein diets. Unfortunately, none of these have lead to meaningful, sustained weight loss. They have attended the bariatric seminar before the visit. DIET:   2-3 meals per day,     Denies nausea (other than with mounjaro use), vomiting, dysphagia  Reports reflux; heartburn triggered by red sauces, less than weekly episodes. EXERCISE:   Walking regimen    Long history of back and joint pain, plantar fasciitis, daily NSAID use. Sleep Apnea assessment:    STOPBANG questionnaire     Do you Snore loudly? no  Do you often feel Tired, fatigued, or sleepy during the daytime? no  Has anyone Observed you stop breathing during your sleep? no  Are you being treated for high blood Pressure? yes  BMI more than 35 kg/m2? yes  Age over 48years old? yes  Neck Circumference >16 inches?  ?  Gender male? no  ______________________________________     SCORE: 3-4     If YES to 0 - 2, low risk of sleep apnea  If YES to 3 - 4 intermediate risk of having sleep apnea  If YES to 5 - 8 high risk of having sleep apnea (or 2 + BMI 35 or Neck > 17\" or Male)           Patient Active Problem List    Diagnosis Date Noted    Arthralgia of both knees 05/02/2023    Hypertension     Diabetes ([facility] [address]     Hypothyroidism     Asthma     Primary localized osteoarthritis of pelvic region and thigh 30/64/1381    Umbilical hernia without obstruction and without gangrene 07/30/2020    Biliary dyskinesia 07/30/2020    Severe obesity ([address] 07/02/2020

## 2023-05-04 LAB — UREA BREATH TEST QL: NEGATIVE

## 2023-05-09 ENCOUNTER — HOSPITAL ENCOUNTER (OUTPATIENT)
Facility: HOSPITAL | Age: 53
Discharge: HOME | End: 2023-05-12

## 2023-05-09 ENCOUNTER — CLINICAL DOCUMENTATION (OUTPATIENT)
Facility: HOSPITAL | Age: 53
End: 2023-05-09

## 2023-05-09 NOTE — PROGRESS NOTES
[address] [place] [address] [facility], [address]    Patient's Name: Sixto Quiles   Age: 47 y.o. YOB: 1970   Sex: female                Session: 1 of  3   Surgeon:  Dr. Compa Gaona    Height: 5 f 2   Weight:    209      Lbs. BMI:    Pounds Lost since last month: 0                 Pounds Gained since last month: 0    Starting Weight: 209     Previous Months Weight: 209  Overall Pounds Lost: 0   Overall Pounds Gained: 0    Patient has not been to support group. Do you smoke: None    Alcohol intake:  Number of drinks at a time:  None  Number of times a week: None    Class Guidelines    Guidelines are reviewed with patient at the start of every class. 1. Patient understands that weight loss trial classes must be consecutive. Patient understands if they miss a class, it is their responsibility to contact me to reschedule class. I will reach out to patient after their first no show. 2.  Patient understands the expectations that weight maintenance/weight loss is expected during the classes. Failure to demonstrate changes may result in one extra month of weight loss trial, followed by going back to see the surgeon. Patient understands that they CAN NOT gain any weight during the weight loss trial.  Gaining weight will result in extra classes. 3. Patient is also instructed to be doing their labs, blood work, psych visit, support group and any other test that the surgeon has used while they are working on their weight loss trial.  4.  Patient was instructed to bring their blue binder to every class and appointment. Other Pertinent Information:     Changes Made Since Last Class: Cutting out soda and sweet tea    Eating Habits and Behaviors      Today in class we talked about the key diet principles.   We start off each class talking about these principles, which include cutting out liquid calories and focusing on water or other

## 2023-05-18 ENCOUNTER — OFFICE VISIT (OUTPATIENT)
Age: 53
End: 2023-05-18
Payer: OTHER GOVERNMENT

## 2023-05-18 VITALS
DIASTOLIC BLOOD PRESSURE: 94 MMHG | OXYGEN SATURATION: 97 % | HEART RATE: 73 BPM | TEMPERATURE: 97.3 F | RESPIRATION RATE: 16 BRPM | BODY MASS INDEX: 38.18 KG/M2 | SYSTOLIC BLOOD PRESSURE: 136 MMHG | HEIGHT: 62 IN | WEIGHT: 207.5 LBS

## 2023-05-18 DIAGNOSIS — Z01.811 PRE-OP CHEST EXAM: ICD-10-CM

## 2023-05-18 DIAGNOSIS — R29.818 SUSPECTED SLEEP APNEA: ICD-10-CM

## 2023-05-18 DIAGNOSIS — J45.909 UNCOMPLICATED ASTHMA, UNSPECIFIED ASTHMA SEVERITY, UNSPECIFIED WHETHER PERSISTENT: Primary | ICD-10-CM

## 2023-05-18 DIAGNOSIS — R06.83 SNORING: ICD-10-CM

## 2023-05-18 DIAGNOSIS — E66.9 OBESITY (BMI 30-39.9): ICD-10-CM

## 2023-05-18 PROCEDURE — 99204 OFFICE O/P NEW MOD 45 MIN: CPT | Performed by: INTERNAL MEDICINE

## 2023-05-18 PROCEDURE — 3075F SYST BP GE 130 - 139MM HG: CPT | Performed by: INTERNAL MEDICINE

## 2023-05-18 PROCEDURE — 3079F DIAST BP 80-89 MM HG: CPT | Performed by: INTERNAL MEDICINE

## 2023-05-18 RX ORDER — SUMATRIPTAN 25 MG/1
25 TABLET, FILM COATED ORAL DAILY PRN
COMMUNITY
Start: 2023-04-10

## 2023-05-18 RX ORDER — MONTELUKAST SODIUM 10 MG/1
10 TABLET ORAL DAILY
COMMUNITY
Start: 2023-04-10

## 2023-05-18 RX ORDER — METFORMIN HYDROCHLORIDE 500 MG/1
1000 TABLET, EXTENDED RELEASE ORAL 2 TIMES DAILY
COMMUNITY
Start: 2023-04-10 | End: 2023-05-18

## 2023-05-18 ASSESSMENT — PATIENT HEALTH QUESTIONNAIRE - PHQ9
SUM OF ALL RESPONSES TO PHQ QUESTIONS 1-9: 1
SUM OF ALL RESPONSES TO PHQ QUESTIONS 1-9: 1
2. FEELING DOWN, DEPRESSED OR HOPELESS: 1
1. LITTLE INTEREST OR PLEASURE IN DOING THINGS: 0
SUM OF ALL RESPONSES TO PHQ QUESTIONS 1-9: 1
SUM OF ALL RESPONSES TO PHQ9 QUESTIONS 1 & 2: 1
SUM OF ALL RESPONSES TO PHQ QUESTIONS 1-9: 1

## 2023-05-18 ASSESSMENT — SLEEP AND FATIGUE QUESTIONNAIRES
HOW LIKELY ARE YOU TO NOD OFF OR FALL ASLEEP WHILE SITTING AND READING: 0
NECK CIRCUMFERENCE (INCHES): 16
HOW LIKELY ARE YOU TO NOD OFF OR FALL ASLEEP WHILE SITTING AND TALKING TO SOMEONE: 0
HOW LIKELY ARE YOU TO NOD OFF OR FALL ASLEEP WHILE SITTING QUIETLY AFTER LUNCH WITHOUT ALCOHOL: 0
HOW LIKELY ARE YOU TO NOD OFF OR FALL ASLEEP IN A CAR, WHILE STOPPED FOR A FEW MINUTES IN TRAFFIC: 0
HOW LIKELY ARE YOU TO NOD OFF OR FALL ASLEEP WHILE LYING DOWN TO REST IN THE AFTERNOON WHEN CIRCUMSTANCES PERMIT: 2
ESS TOTAL SCORE: 5
HOW LIKELY ARE YOU TO NOD OFF OR FALL ASLEEP WHILE WATCHING TV: 1
HOW LIKELY ARE YOU TO NOD OFF OR FALL ASLEEP WHEN YOU ARE A PASSENGER IN A CAR FOR AN HOUR WITHOUT A BREAK: 2
HOW LIKELY ARE YOU TO NOD OFF OR FALL ASLEEP WHILE SITTING INACTIVE IN A PUBLIC PLACE: 0

## 2023-05-18 NOTE — PROGRESS NOTES
Sudha Tay presents today for   Chief Complaint   Patient presents with    Sleep Apnea       Is someone accompanying this pt? No    Is the patient using any DME equipment during OV? No    -DME Company NA    Depression Screening:    PHQ-9 Questionaire 5/18/2023   Little interest or pleasure in doing things 0   Feeling down, depressed, or hopeless 1   PHQ-9 Total Score 1       Learning Needs Questionnaire:     No question data found. Fall Risk:     No flowsheet data found. Abuse Screening:     No flowsheet data found. Coordination of Care:    1. Have you been to the ER, urgent care clinic since your last visit? Hospitalized since your last visit? No    2. Have you seen or consulted any other health care providers outside of the [address] [place] [address] since your last visit? Include any pap smears or colon screening. [facility] - PCP    Medication list has been update per patient.
enlarged lymph nodes, thyroid is not enlarged, non-tender, No JVD  Chest: Grossly normal.  Lungs: Bilateral breath sounds are present. Moderate air entry, clear to auscultation bilaterally. Heart: Regular rate and rhythm, S1S2 present, without murmur. Abdomen: Obese,soft, non-tender  Extremity: Negative for cyanosis, edema, or clubbing. Skin: Skin color, texture, turgor normal. No rashes or lesions. Back: well healed midline surgical scar in lumbar region, NS battery left lower back  Neurological: CN 2-12 grossly intact, normal muscle tone. Data Reviewed:  CBC:   Lab Results   Component Value Date/Time     07/27/2020 07:35 AM    K 3.9 07/27/2020 07:35 AM     07/27/2020 07:35 AM    CO2 28 07/27/2020 07:35 AM    BUN 9 07/27/2020 07:35 AM    CREATININE 0.84 07/27/2020 07:35 AM    GLUCOSE 167 07/27/2020 07:35 AM    CALCIUM 8.8 07/27/2020 07:35 AM          BMP:   Lab Results   Component Value Date/Time     07/27/2020 07:35 AM    K 3.9 07/27/2020 07:35 AM     07/27/2020 07:35 AM    CO2 28 07/27/2020 07:35 AM    BUN 9 07/27/2020 07:35 AM    CREATININE 0.84 07/27/2020 07:35 AM    GLUCOSE 167 07/27/2020 07:35 AM    CALCIUM 8.8 07/27/2020 07:35 AM          TSH:  No results found for: TSH, Y8JAASJ, H8JRGXZ, THYROIDAB, FT3, T4FREE      Imaging:  [x]I have personally reviewed the patients radiographs section     XR CHEST STANDARD TWO VW 07/27/2020    Narrative  Chest PA and lateral views    CPT CODE: 06687    HISTORY: Biliary dyskinesia. Umbilical hernia    COMPARISON: None. FINDINGS: The heart is normal in size. The lungs are clear. The bilateral  costophrenic angles are sharp. The mediastinum, pulmonary vascularity and bony  thorax appear unremarkable. Impression  IMPRESSION:    No acute cardiopulmonary process. Thank you for your referral.     No results found for this or any previous visit from the past 3650 days.        Cardiac Echo:     No results found for this or any previous

## 2023-05-18 NOTE — PATIENT INSTRUCTIONS
important? Getting enough quality sleep is a basic part of good health. When your sleep suffers, your physical health, mood, and your thoughts can suffer too. You may find yourself feeling more grumpy or stressed. Not getting enough sleep also can lead to serious problems, including injury, accidents, anxiety, and depression. What might cause poor sleeping? Many things can cause sleep problems, including:  Changes to your sleep schedule. Stress. Stress can be caused by fear about a single event, such as giving a speech. Or you may have ongoing stress, such as worry about work or school. Depression, anxiety, and other mental or emotional conditions. Changes in your sleep habits or surroundings. This includes changes that happen where you sleep, such as noise, light, or sleeping in a different bed. It also includes changes in your sleep pattern, such as having jet lag or working a late shift. Health problems, such as pain, breathing problems, and restless legs syndrome. Lack of regular exercise. Using alcohol, nicotine, or caffeine before bed. How can you help yourself? Here are some tips that may help you sleep more soundly and wake up feeling more refreshed. Your sleeping area   Use your bedroom only for sleeping and sex. A bit of light reading may help you fall asleep. But if it doesn't, do your reading elsewhere in the house. Try not to use your TV, computer, smartphone, or tablet while you are in bed. Be sure your bed is big enough to stretch out comfortably, especially if you have a sleep partner. Keep your bedroom quiet, dark, and cool. Use curtains, blinds, or a sleep mask to block out light. To block out noise, use earplugs, soothing music, or a \"white noise\" machine. Your evening and bedtime routine   Create a relaxing bedtime routine. You might want to take a warm shower or bath, or listen to soothing music. Go to bed at the same time every night.  And get up at the same time every morning,

## 2023-05-30 ENCOUNTER — PROCEDURE VISIT (OUTPATIENT)
Age: 53
End: 2023-05-30
Payer: OTHER GOVERNMENT

## 2023-05-30 VITALS — WEIGHT: 202 LBS | RESPIRATION RATE: 10 BRPM | BODY MASS INDEX: 37.17 KG/M2 | HEIGHT: 62 IN

## 2023-05-30 DIAGNOSIS — J45.909 UNCOMPLICATED ASTHMA, UNSPECIFIED ASTHMA SEVERITY, UNSPECIFIED WHETHER PERSISTENT: Primary | ICD-10-CM

## 2023-05-30 DIAGNOSIS — Z01.811 PRE-OP CHEST EXAM: ICD-10-CM

## 2023-05-30 DIAGNOSIS — R06.83 SNORING: ICD-10-CM

## 2023-05-30 PROCEDURE — 94729 DIFFUSING CAPACITY: CPT | Performed by: INTERNAL MEDICINE

## 2023-05-30 PROCEDURE — 94727 GAS DIL/WSHOT DETER LNG VOL: CPT | Performed by: INTERNAL MEDICINE

## 2023-05-30 PROCEDURE — 94060 EVALUATION OF WHEEZING: CPT | Performed by: INTERNAL MEDICINE

## 2023-06-26 ENCOUNTER — OFFICE VISIT (OUTPATIENT)
Age: 53
End: 2023-06-26
Payer: OTHER GOVERNMENT

## 2023-06-26 VITALS
HEIGHT: 62 IN | TEMPERATURE: 97 F | SYSTOLIC BLOOD PRESSURE: 148 MMHG | OXYGEN SATURATION: 96 % | WEIGHT: 193 LBS | DIASTOLIC BLOOD PRESSURE: 91 MMHG | HEART RATE: 86 BPM | BODY MASS INDEX: 35.51 KG/M2

## 2023-06-26 DIAGNOSIS — I10 HYPERTENSION, UNSPECIFIED TYPE: ICD-10-CM

## 2023-06-26 DIAGNOSIS — Z71.89 ENCOUNTER FOR PRE-BARIATRIC SURGERY COUNSELING AND EDUCATION: ICD-10-CM

## 2023-06-26 DIAGNOSIS — E03.9 HYPOTHYROIDISM, UNSPECIFIED TYPE: ICD-10-CM

## 2023-06-26 DIAGNOSIS — E66.01 CLASS 2 SEVERE OBESITY DUE TO EXCESS CALORIES WITH SERIOUS COMORBIDITY AND BODY MASS INDEX (BMI) OF 35.0 TO 35.9 IN ADULT (HCC): Primary | ICD-10-CM

## 2023-06-26 PROCEDURE — 3074F SYST BP LT 130 MM HG: CPT | Performed by: NURSE PRACTITIONER

## 2023-06-26 PROCEDURE — 99213 OFFICE O/P EST LOW 20 MIN: CPT | Performed by: NURSE PRACTITIONER

## 2023-06-26 PROCEDURE — 3078F DIAST BP <80 MM HG: CPT | Performed by: NURSE PRACTITIONER

## 2023-06-28 ASSESSMENT — ENCOUNTER SYMPTOMS
NAUSEA: 0
VOMITING: 0
ABDOMINAL PAIN: 0
DIARRHEA: 0
COUGH: 0
CONSTIPATION: 0
SHORTNESS OF BREATH: 0

## 2023-06-29 ENCOUNTER — HOSPITAL ENCOUNTER (OUTPATIENT)
Dept: SLEEP MEDICINE | Facility: HOSPITAL | Age: 53
End: 2023-06-29
Attending: INTERNAL MEDICINE
Payer: OTHER GOVERNMENT

## 2023-06-29 DIAGNOSIS — R06.83 SNORING: ICD-10-CM

## 2023-06-29 DIAGNOSIS — Z01.811 PRE-OP CHEST EXAM: ICD-10-CM

## 2023-06-29 DIAGNOSIS — J45.909 UNCOMPLICATED ASTHMA, UNSPECIFIED ASTHMA SEVERITY, UNSPECIFIED WHETHER PERSISTENT: ICD-10-CM

## 2023-06-29 PROCEDURE — 95810 POLYSOM 6/> YRS 4/> PARAM: CPT

## 2023-06-29 ASSESSMENT — SLEEP AND FATIGUE QUESTIONNAIRES
HOW LIKELY ARE YOU TO NOD OFF OR FALL ASLEEP WHEN YOU ARE A PASSENGER IN A CAR FOR AN HOUR WITHOUT A BREAK: 2
ESS TOTAL SCORE: 4
HOW LIKELY ARE YOU TO NOD OFF OR FALL ASLEEP WHILE SITTING QUIETLY AFTER LUNCH WITHOUT ALCOHOL: 0
HOW LIKELY ARE YOU TO NOD OFF OR FALL ASLEEP WHILE WATCHING TV: 0
HOW LIKELY ARE YOU TO NOD OFF OR FALL ASLEEP WHILE SITTING AND TALKING TO SOMEONE: 0
HOW LIKELY ARE YOU TO NOD OFF OR FALL ASLEEP WHILE SITTING AND READING: 0
HOW LIKELY ARE YOU TO NOD OFF OR FALL ASLEEP WHILE LYING DOWN TO REST IN THE AFTERNOON WHEN CIRCUMSTANCES PERMIT: 2
HOW LIKELY ARE YOU TO NOD OFF OR FALL ASLEEP WHILE SITTING INACTIVE IN A PUBLIC PLACE: 0
HOW LIKELY ARE YOU TO NOD OFF OR FALL ASLEEP IN A CAR, WHILE STOPPED FOR A FEW MINUTES IN TRAFFIC: 0

## 2023-06-30 VITALS
HEIGHT: 62 IN | WEIGHT: 193.6 LBS | DIASTOLIC BLOOD PRESSURE: 93 MMHG | HEART RATE: 88 BPM | SYSTOLIC BLOOD PRESSURE: 135 MMHG | BODY MASS INDEX: 35.63 KG/M2

## 2023-07-18 ENCOUNTER — CLINICAL DOCUMENTATION (OUTPATIENT)
Facility: HOSPITAL | Age: 53
End: 2023-07-18

## 2023-07-18 ENCOUNTER — HOSPITAL ENCOUNTER (OUTPATIENT)
Facility: HOSPITAL | Age: 53
Discharge: HOME | End: 2023-07-21

## 2023-07-18 NOTE — PROGRESS NOTES
Patient has completed a 3 month weight loss trial.  Patient understands that I will need the following in order to be cleared nutritionally. -  Complete nutrition assessment    - A weight sent in or come to [facility] to get a weight check. -  Patient has attended a support group meeting. Comments:  Patient is scheduled for her final weight check on August 1.     Abhijit Bryson RD    7/18/2023

## 2023-07-31 ENCOUNTER — OFFICE VISIT (OUTPATIENT)
Age: 53
End: 2023-07-31
Payer: OTHER GOVERNMENT

## 2023-07-31 VITALS
RESPIRATION RATE: 18 BRPM | BODY MASS INDEX: 34.34 KG/M2 | TEMPERATURE: 97.2 F | WEIGHT: 186.6 LBS | HEIGHT: 62 IN | HEART RATE: 92 BPM | OXYGEN SATURATION: 99 % | SYSTOLIC BLOOD PRESSURE: 156 MMHG | DIASTOLIC BLOOD PRESSURE: 95 MMHG

## 2023-07-31 DIAGNOSIS — I10 PRIMARY HYPERTENSION: ICD-10-CM

## 2023-07-31 DIAGNOSIS — G47.33 OSA (OBSTRUCTIVE SLEEP APNEA): ICD-10-CM

## 2023-07-31 DIAGNOSIS — J45.40 MODERATE PERSISTENT ASTHMA WITHOUT COMPLICATION: Primary | ICD-10-CM

## 2023-07-31 DIAGNOSIS — E66.9 OBESITY (BMI 30-39.9): ICD-10-CM

## 2023-07-31 PROCEDURE — 3078F DIAST BP <80 MM HG: CPT | Performed by: INTERNAL MEDICINE

## 2023-07-31 PROCEDURE — 3074F SYST BP LT 130 MM HG: CPT | Performed by: INTERNAL MEDICINE

## 2023-07-31 PROCEDURE — 99213 OFFICE O/P EST LOW 20 MIN: CPT | Performed by: INTERNAL MEDICINE

## 2023-07-31 RX ORDER — TRAMADOL HYDROCHLORIDE 50 MG/1
TABLET ORAL
COMMUNITY
Start: 2023-07-14

## 2023-07-31 RX ORDER — MELOXICAM 15 MG/1
TABLET ORAL
COMMUNITY

## 2023-07-31 RX ORDER — FLUTICASONE PROPIONATE AND SALMETEROL 500; 50 UG/1; UG/1
1 POWDER RESPIRATORY (INHALATION) EVERY 12 HOURS
Qty: 180 EACH | Refills: 3 | Status: SHIPPED | OUTPATIENT
Start: 2023-07-31

## 2023-07-31 RX ORDER — INSULIN LISPRO 200 [IU]/ML
INJECTION, SOLUTION SUBCUTANEOUS
COMMUNITY

## 2023-07-31 RX ORDER — PIOGLITAZONEHYDROCHLORIDE 15 MG/1
TABLET ORAL
COMMUNITY

## 2023-07-31 ASSESSMENT — PATIENT HEALTH QUESTIONNAIRE - PHQ9
SUM OF ALL RESPONSES TO PHQ QUESTIONS 1-9: 7
SUM OF ALL RESPONSES TO PHQ QUESTIONS 1-9: 7
SUM OF ALL RESPONSES TO PHQ9 QUESTIONS 1 & 2: 2
9. THOUGHTS THAT YOU WOULD BE BETTER OFF DEAD, OR OF HURTING YOURSELF: 0
5. POOR APPETITE OR OVEREATING: 0
2. FEELING DOWN, DEPRESSED OR HOPELESS: 2
3. TROUBLE FALLING OR STAYING ASLEEP: 2
10. IF YOU CHECKED OFF ANY PROBLEMS, HOW DIFFICULT HAVE THESE PROBLEMS MADE IT FOR YOU TO DO YOUR WORK, TAKE CARE OF THINGS AT HOME, OR GET ALONG WITH OTHER PEOPLE: 1
4. FEELING TIRED OR HAVING LITTLE ENERGY: 1
6. FEELING BAD ABOUT YOURSELF - OR THAT YOU ARE A FAILURE OR HAVE LET YOURSELF OR YOUR FAMILY DOWN: 1
SUM OF ALL RESPONSES TO PHQ QUESTIONS 1-9: 7
1. LITTLE INTEREST OR PLEASURE IN DOING THINGS: 0
7. TROUBLE CONCENTRATING ON THINGS, SUCH AS READING THE NEWSPAPER OR WATCHING TELEVISION: 1
8. MOVING OR SPEAKING SO SLOWLY THAT OTHER PEOPLE COULD HAVE NOTICED. OR THE OPPOSITE, BEING SO FIGETY OR RESTLESS THAT YOU HAVE BEEN MOVING AROUND A LOT MORE THAN USUAL: 0
SUM OF ALL RESPONSES TO PHQ QUESTIONS 1-9: 7

## 2023-07-31 ASSESSMENT — SLEEP AND FATIGUE QUESTIONNAIRES
HOW LIKELY ARE YOU TO NOD OFF OR FALL ASLEEP WHEN YOU ARE A PASSENGER IN A CAR FOR AN HOUR WITHOUT A BREAK: 2
ESS TOTAL SCORE: 3
HOW LIKELY ARE YOU TO NOD OFF OR FALL ASLEEP WHILE WATCHING TV: 0
HOW LIKELY ARE YOU TO NOD OFF OR FALL ASLEEP WHILE SITTING INACTIVE IN A PUBLIC PLACE: 0
HOW LIKELY ARE YOU TO NOD OFF OR FALL ASLEEP WHILE SITTING QUIETLY AFTER LUNCH WITHOUT ALCOHOL: 0
HOW LIKELY ARE YOU TO NOD OFF OR FALL ASLEEP WHILE SITTING AND READING: 0
HOW LIKELY ARE YOU TO NOD OFF OR FALL ASLEEP IN A CAR, WHILE STOPPED FOR A FEW MINUTES IN TRAFFIC: 0
HOW LIKELY ARE YOU TO NOD OFF OR FALL ASLEEP WHILE LYING DOWN TO REST IN THE AFTERNOON WHEN CIRCUMSTANCES PERMIT: 1
HOW LIKELY ARE YOU TO NOD OFF OR FALL ASLEEP WHILE SITTING AND TALKING TO SOMEONE: 0

## 2023-07-31 NOTE — PROGRESS NOTES
Jeffry Mock presents today for   Chief Complaint   Patient presents with    Asthma    Sleep Problem    Results     PFT done 05/30/23  CXR/Labs done 06/13/23  Split Night done 06/29/23       Is someone accompanying this pt? No    Is the patient using any DME equipment during OV? No    -DME Company NA    Depression Screening:    PHQ-9 Questionaire 5/18/2023   Little interest or pleasure in doing things 0   Feeling down, depressed, or hopeless 1   PHQ-9 Total Score 1       Learning Needs Questionnaire:     Who is the primary learner? Patient    What is the preferred language for health care of the primary learner? ENGLISH    How does the primary learner prefer to learn new concepts? DEMONSTRATION    Answered By Patient    Relationship to Learner SELF          Fall Risk:     No flowsheet data found. Abuse Screening:     No flowsheet data found. Coordination of Care:    1. Have you been to the ER, urgent care clinic since your last visit? Hospitalized since your last visit? No    2. Have you seen or consulted any other health care providers outside of the [address] [place] [address] since your last visit? Include any pap smears or colon screening. PCP    Medication list has been update per patient.
inhaler every 6 hours as needed      amLODIPine (NORVASC) 10 MG tablet 0.5 tablets daily      amphetamine-dextroamphetamine (ADDERALL) 20 MG tablet 1 tablet 2 times daily. carisoprodol (SOMA) 350 MG tablet every 8 hours as needed. vitamin D3 (CHOLECALCIFEROL) 125 MCG (5000 UT) TABS tablet Take 1 tablet by mouth daily      cyclobenzaprine (FLEXERIL) 10 MG tablet 3 times daily as needed      diclofenac sodium (VOLTAREN) 1 % GEL Apply 2 g topically to left paracervical region and left upper back. fluticasone (FLONASE) 50 MCG/ACT nasal spray 2 sprays by Nasal route daily      fluticasone-salmeterol (ADVAIR) 250-50 MCG/ACT AEPB diskus inhaler 1 puff 2 times daily      metFORMIN (GLUCOPHAGE) 1000 MG tablet 1 tablet 2 times daily      metoprolol succinate (TOPROL XL) 200 MG extended release tablet 1 tablet daily      thyroid (ARMOUR) 90 MG tablet 1 tablet daily      insulin lispro (HUMALOG KWIKPEN) 200 UNIT/ML SOPN pen  (Patient not taking: Reported on 7/31/2023)      meloxicam (MOBIC) 15 MG tablet  (Patient not taking: Reported on 7/31/2023)      pioglitazone (ACTOS) 15 MG tablet  (Patient not taking: Reported on 7/31/2023)       No current facility-administered medications for this visit.           Allergy:  Allergies   Allergen Reactions    Ace Inhibitors Angioedema    Adhesive Tape Hives and Itching    Doxycycline Hives         Review of Systems  General ROS: positive for  - sleep disturbance- improved since last visit  negative for - chills, fever, hot flashes, malaise, or night sweats  ENT ROS: negative for - epistaxis, nasal congestion, nasal discharge, nasal polyps, oral lesions, sinus pain, sneezing, sore throat, tinnitus, or vertigo  Hematological and Lymphatic ROS: negative for - bleeding problems, blood clots, bruising, jaundice, pallor or swollen lymph nodes  Endocrine ROS: negative for - polydipsia/polyuria, skin changes, temperature intolerance or unexpected weight changes  Respiratory ROS: no

## 2023-07-31 NOTE — PATIENT INSTRUCTIONS
Please call our clinic back at [phone] or send a message on [employer] if you have any questions or concerns or if you are experiencing any of the following: You have not received a follow up appointment within 30 days prior the recommended follow up time. If you are not tolerating treatment plan and/or not able to obtain equipment or prescribed medication(s). if you are experiencing any difficulties with the [address] [place] [address] 1  (DME) Company you may be using or is assigned to you. Two weeks have passed and you have not received an appointment for a scheduled procedure. Two weeks have passed since you underwent a test and/or procedure and you have not received your results. Patients on Inhaler Therapy: If you are having difficulty and/or are not able to obtain refills of your inhalers- Please contact our office as soon as possible  Please read directions carefully and use inhalers as directed  If you are not sure how to properly use your inhaler please call our office or ask your pharmacist.  Instructional video can also be found on-line as well    If you are using a CPAP/BIPAP, or Home Ventilator Device- Please note the following. Currently, many DMEs are experiencing supply chain difficulties and orders for equipment may be back logged several weeks to months. Your  Durable Medical Equipment (DME ) company is supposed to provide you with replacement filters, tubing and masks. You can either call your DME when you need new supplies or you can arrange for an automatic shipment schedule. Your need to be seen by our office at lat minimum of every 12 months in order to renew the prescription for these supplies. Please make note of who your DME company is and their phone number. Please make sure that you clean your mask and hosing on a regular basis.   Your DME can provide you with additional information regarding proper care and cleaning of your device           Safety is

## 2023-08-01 ENCOUNTER — CLINICAL DOCUMENTATION (OUTPATIENT)
Facility: HOSPITAL | Age: 53
End: 2023-08-01

## 2023-08-01 ENCOUNTER — HOSPITAL ENCOUNTER (OUTPATIENT)
Facility: HOSPITAL | Age: 53
Discharge: HOME | End: 2023-08-04

## 2023-08-01 NOTE — PROGRESS NOTES
Nutrition Evaluation    Patient's Name: Tino Hobson   Age: 47 y.o. YOB: 1970   Sex: female    Height: 5 f 2 Weight: 186 BMI:  34.1  Starting Weight:  209        Smoking Status:  None  Alcohol Intake:  Number of Drinks at a Time: 1  Number of Times a Week: 1    Changes made during classes include:  Decrease in refined sugar, soda, and sweet tea. Increased water  More vegetables and meat        Two things that patient learned during this weight loss trial:  Avoid skipping meals  Eat slwoly    Summary:  I feel that Tino Hobson has demonstrated appropriate diet changes and is ready to move forward with surgery. Patient has been briefed on the importance of the protein drinks, vitamins, and the transition of the diet stages. Patient understands that the long-term diet will focus on protein and vegetables. Patient understand the effects of carbohydrates after surgery and what reactive hypoglycemia is. Patient is aware that they will be attending pre-op class 2 weeks before surgery and will get more detailed information on the post-op diet guidelines. Patient will see me again at 6 weeks post-op. At this 6 week visit, RD will assess how patient is tolerating soft protein and advance to vegetables, if tolerating soft protein without difficulty. Patient will also see RD again at 9 months post-op. This visit will assess patient's compliance with current protocol, including diet, vitamins, protein shakes, and exercise. Post-op diet guidelines will be reinforced. RD is available for questions and to meet with patient outside of the 6 week and 9 month post-op visit. We spent a lot of time talking about the vitamins. Patient understands the importance of being compliant with the diet protocol and the complications and risks that can occur if they are non-compliant with the nutritional protocol. Patient has attended at least one support group.     Patient has made positive nutrition

## 2024-01-25 ENCOUNTER — HOSPITAL ENCOUNTER (OUTPATIENT)
Facility: HOSPITAL | Age: 54
Setting detail: RECURRING SERIES
Discharge: HOME | End: 2024-01-28
Payer: OTHER GOVERNMENT

## 2024-01-25 PROCEDURE — 97110 THERAPEUTIC EXERCISES: CPT

## 2024-01-25 PROCEDURE — 97161 PT EVAL LOW COMPLEX 20 MIN: CPT

## 2024-01-25 NOTE — PROGRESS NOTES
PHYSICAL / OCCUPATIONAL THERAPY - DAILY TREATMENT NOTE (updated )    Patient Name: Bethany Scott    Date: 2024    : 1970  Insurance: Payor:  EAST / Plan:  EAST / Product Type: *No Product type* /      Patient  verified yes     Visit #   Current / Total 1 8 - 12   Time   In / Out 8:43 9:32   Pain   In / Out 3 5   Subjective Functional Status/Changes: See Subjective.     TREATMENT AREA =  Pain in left foot [M79.672]    SUBJECTIVE  Chief Complaint: Pt notes she began to experience foot pain in  which led her to visit with her family physician. Pt states her family physician sent her to podiatry, whom stated she has \"plantar fasciitis\". Pt notes she has held off from therapy due to insurance complications. Pt notes she had an injection in November, which she states helped for a couple weeks. Pt notes she now has opted for therapy since her insurance complications have resolved.    Pain Level:  Current: 3/10  Best: 2-3/10  Worst: 10/10  Aggravating Factors: Pt notes prolonged standing and walking increases her pain.  Alleviating Factors: Pt notes she has been using pain medication, \"spike balls\", and ice on her foot for pain relief. Pt notes she also uses CBD cream for pain relief.      Home: Pt lives at home with her  and daughter, who are supportive with ADLs.  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  Hobbies: Pt enjoys reading, listening to music, and riding her motorcycle.  Sleep: Pt reports difficulty staying asleep due to foot pain, along with pre-existing low back pain.  PMH: DM, spinal fusion of L5 - S1, spinal cord stimulator, pt notes prior unknown ligament repair in lateral L ankle in .  Goals: Pt would like to \"loosen her achilles\", strengthen her ankle, and resolve her pain. Pt states she would like to avoid any surgeries.    OBJECTIVE    Physical Therapy Evaluation  - Foot and Ankle    Gait: [] Normal    [x] Abnormal    [] Antalgic    [] NWB

## 2024-01-25 NOTE — PROGRESS NOTES
[facility] [address]  [address]  [place] [address]  [phone] fax    Plan of Care / Statement of Necessity for Physical Therapy Services     Patient Name: Bethany Scott : 1970   Medical   Diagnosis: Pain in left foot [M79.672] Treatment Diagnosis: M79.672  LEFT FOOT PAIN       Onset Date:  Payor :  Payor:  EAST / Plan:  EAST / Product Type: *No Product type* /    Referral Source: Gary Oliveira DPM Start of Care (SOC): 2024   Prior Hospitalization: See medical history Provider #: [id number]   Prior Level of Function: Full function   Comorbidities: DM, spinal fusion of L5 - S1, spinal cord stimulator, pt notes prior unknown ligament repair in lateral L ankle in .      Assessment / key information:      SUBJECTIVE  Chief Complaint: Pt notes she began to experience foot pain in  which led her to visit with her family physician. Pt states her family physician sent her to podiatry, whom stated she has \"plantar fasciitis\". Pt notes she has held off from therapy due to insurance complications. Pt notes she had an injection in November, which she states helped for a couple weeks. Pt notes she now has opted for therapy since her insurance complications have resolved.     Pain Level:  Current: 3/10  Best: 2-3/10  Worst: 10/10  Aggravating Factors: Pt notes prolonged standing and walking increases her pain.  Alleviating Factors: Pt notes she has been using pain medication, \"spike balls\", and ice on her foot for pain relief. Pt notes she also uses CBD cream for pain relief.      Home: Pt lives at home with her  and daughter, who are supportive with ADLs.  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  Hobbies: Pt enjoys reading, listening to music, and riding her motorcycle.  Sleep: Pt reports difficulty staying asleep due to foot pain, along with pre-existing low back pain.  PMH: DM, spinal fusion of L5 - S1, spinal

## 2024-01-31 ENCOUNTER — HOSPITAL ENCOUNTER (OUTPATIENT)
Facility: HOSPITAL | Age: 54
Setting detail: RECURRING SERIES
Discharge: HOME | End: 2024-02-03
Payer: OTHER GOVERNMENT

## 2024-01-31 PROCEDURE — 97110 THERAPEUTIC EXERCISES: CPT

## 2024-01-31 PROCEDURE — 97140 MANUAL THERAPY 1/> REGIONS: CPT

## 2024-01-31 PROCEDURE — 97535 SELF CARE MNGMENT TRAINING: CPT

## 2024-01-31 PROCEDURE — 97112 NEUROMUSCULAR REEDUCATION: CPT

## 2024-01-31 PROCEDURE — 97530 THERAPEUTIC ACTIVITIES: CPT

## 2024-01-31 NOTE — PROGRESS NOTES
improved QoL.  Status at IE : >2/10 at worst  Remains elevated (10/10)    3.  Pt will be able to perform 30/30 unilateral calf raise bilaterally to allow for increased ease with gait and stair descent.  Status at IE : unable    4.  Pt will report being able to walk/stand for >30 minutes without increased symptoms to allow for improved tolerance with activities such as grocery shopping and household cleaning.  Status at IE : unable   patient to monitor current tolerance/unsure at this time of tolerance 1/31/24    Next PN/ RC due 2/23/24  Auth due (visit number/ date) 15 visits exp 5/16/24    PLAN  - Continue Plan of Care  - Upgrade activities as tolerated    Camila Martínez PTA    1/31/2024    10:02 AM    Future Appointments   Date Time Provider Department Center   1/31/2024  4:10 PM Camila Martínez PTA [facility]   2/2/2024 12:10 PM Camila Martínez PTA [facility]   2/7/2024  4:50 PM Aaron Lyons, PT [facility]   2/9/2024 12:10 PM Vincenzo Sung, PT [facility]   2/14/2024  4:50 PM Vincenzo Sung, PT [facility]   2/16/2024 12:10 PM Vincenzo Sung, PT [facility]   2/21/2024  4:50 PM Vincenzo Sung, PT [facility]   2/23/2024 12:10 PM Vincenzo Sung, PT [facility]   2/28/2024  4:50 PM Vincenzo Sung, PT [facility]

## 2024-02-02 ENCOUNTER — HOSPITAL ENCOUNTER (OUTPATIENT)
Facility: HOSPITAL | Age: 54
Setting detail: RECURRING SERIES
Discharge: HOME | End: 2024-02-05
Payer: OTHER GOVERNMENT

## 2024-02-02 PROCEDURE — 97112 NEUROMUSCULAR REEDUCATION: CPT

## 2024-02-02 PROCEDURE — 97140 MANUAL THERAPY 1/> REGIONS: CPT

## 2024-02-02 PROCEDURE — 97110 THERAPEUTIC EXERCISES: CPT

## 2024-02-02 NOTE — PROGRESS NOTES
(timed):  improve balance, coordination, kinesthetic sense, posture, core stability and proprioception to improve patient's ability to develop conscious control of individual muscles and awareness of position of extremities in order to progress to PLOF and address remaining functional goals. (see flow sheet as applicable)     Details if applicable:     10 10 [id number] Manual Therapy (timed):  decrease pain, increase ROM, and increase tissue extensibility to improve patient's ability to progress to PLOF and address remaining functional goals.  The manual therapy interventions were performed at a separate and distinct time from the therapeutic activities interventions . (see flow sheet as applicable)     Details if applicable:  STM to tricep surae, peroneals, posterior tibialis, and plantar fascia; passive gastroc stretch.    43 43 [facility] Totals Reminder: bill using total billable min of TIMED therapeutic procedures (example: do not include dry needle or estim unattended, both untimed codes, in totals to left)  8-22 min = 1 unit; 23-37 min = 2 units; 38-52 min = 3 units; 53-67 min = 4 units; 68-82 min = 5 units   Total Total     [x]  Patient Education billed concurrently with other procedures   [x] Review HEP    [] Progressed/Changed HEP, detail:    [] Other detail:       Objective Information/Functional Measures/Assessment    Pt tolerating exercises well today, progressed HR/TR to standing today with good return. Continued to demonstrate increased tone at gastroc therefore performing STM to improve tone with good return, also perform STM to plantar fascia, posterior tib & peroneals with good return. Will continue to progress towards more functional exercise at next visit.     Patient will continue to benefit from skilled PT / OT services to modify and progress therapeutic interventions, analyze and address functional mobility deficits, analyze and address ROM deficits, analyze and address strength deficits, analyze and

## 2024-02-07 ENCOUNTER — HOSPITAL ENCOUNTER (OUTPATIENT)
Facility: HOSPITAL | Age: 54
Setting detail: RECURRING SERIES
Discharge: HOME | End: 2024-02-10
Payer: OTHER GOVERNMENT

## 2024-02-07 PROCEDURE — 97140 MANUAL THERAPY 1/> REGIONS: CPT

## 2024-02-07 PROCEDURE — 97112 NEUROMUSCULAR REEDUCATION: CPT

## 2024-02-07 PROCEDURE — 97110 THERAPEUTIC EXERCISES: CPT

## 2024-02-07 NOTE — PROGRESS NOTES
home and community integration to address functional deficits and attain remaining goals.    Progress toward goals / Updated goals:  []  See Progress Note/Recertification    Short Term Goals: To be accomplished in 4 weeks   Pt will be independent with HEP to self manage symptoms post d/c to prevent reoccurrence of injury.  Status at IE : initial  Patient reports compliance; has been performing ABCs everyday.      Long Term Goals: To be accomplished in 6 weeks   Pt will achieve FOTO goal to signify improved functional status with ADLs.  Status at IE : initial  Assess at end of current POC      2.  Pt will reports <2/10 pain at worst with ADLs to highlight an improved QoL.  Status at IE : >2/10 at worst  Remains elevated (10/10)     3.  Pt will be able to perform 30/30 unilateral calf raise bilaterally to allow for increased ease with gait and stair descent.  Status at IE : unable  Current: starting standing Heel raises today     4.  Pt will report being able to walk/stand for >30 minutes without increased symptoms to allow for improved tolerance with activities such as grocery shopping and household cleaning.  Status at IE : unable   patient to monitor current tolerance/unsure at this time of tolerance 1/31/24    Next PN/ RC due 2/23/24  Auth due (visit number/ date) 11 more til auth    PLAN  - Continue Plan of Care    Aaron Lyons PT    2/7/2024    5:10 PM    Future Appointments   Date Time Provider Department Center   2/9/2024 12:10 PM [facility]   2/14/2024  4:50 PM Vincenzo Sung PT [facility]   2/16/2024 12:10 PM Vincenzo Sung PT [facility]   2/21/2024  4:50 PM Vincenzo Sung PT [facility]   2/23/2024 12:10 PM Vincenzo Sung PT [facility]   2/28/2024  4:50 PM Vincenzo Sung PT [facility]

## 2024-02-09 ENCOUNTER — HOSPITAL ENCOUNTER (OUTPATIENT)
Facility: HOSPITAL | Age: 54
Setting detail: RECURRING SERIES
Discharge: HOME | End: 2024-02-12
Payer: OTHER GOVERNMENT

## 2024-02-09 PROCEDURE — 97140 MANUAL THERAPY 1/> REGIONS: CPT

## 2024-02-09 PROCEDURE — 97530 THERAPEUTIC ACTIVITIES: CPT

## 2024-02-09 PROCEDURE — 97112 NEUROMUSCULAR REEDUCATION: CPT

## 2024-02-09 PROCEDURE — 97110 THERAPEUTIC EXERCISES: CPT

## 2024-02-09 NOTE — PROGRESS NOTES
PHYSICAL / OCCUPATIONAL THERAPY - DAILY TREATMENT NOTE    Patient Name: Bethany Scott    Date: 2024    : 1970  Insurance: Payor:  EAST / Plan: [employer] EAST / Product Type: *No Product type* /      Patient  verified Yes     Visit #   Current / Total 5 10   Time   In / Out 1210 1257   Pain   In / Out 5 4   Subjective Functional Status/Changes: \"I'm not in as much pain as the other day.\"     TREATMENT AREA =  Pain in left foot [M79.672]    OBJECTIVE    Modalities Rationale:     decrease pain to improve patient's ability to progress to PLOF and address remaining functional goals.     min [] Estim Unattended, type/location:                                      []  w/ice    []  w/heat    min [] Estim Attended, type/location:                                     []  w/US     []  w/ice    []  w/heat    []  TENS insruct      min []  Mechanical Traction: type/lbs                   []  pro   []  sup   []  int   []  cont    []  before manual    []  after manual    min []  Ultrasound, settings/location:     10 min  unbill [x]  Ice     []  Heat    location/position:  Left foot/ankle in supine with wedge    min []  Paraffin,  details:     min []  Vasopneumatic Device, press/temp:     min []  Whirlpool / Fluido:    If using vaso (only need to measure limb vaso being performed on)      pre-treatment girth :       post-treatment girth :       measured at (landmark location) :      min []  Other:    Skin assessment post-treatment:   Intact      Therapeutic Procedures:    Tx Min Billable or 1:1 Min (if diff from Tx Min) Procedure, Rationale, Specifics   10  44387 Therapeutic Exercise (timed):  increase ROM, strength, coordination, balance, and proprioception to improve patient's ability to progress to PLOF and address remaining functional goals. (see flow sheet as applicable)     Details if applicable:       10  16637 Neuromuscular Re-Education (timed):  improve balance, coordination, kinesthetic sense, posture,

## 2024-02-14 ENCOUNTER — HOSPITAL ENCOUNTER (OUTPATIENT)
Facility: HOSPITAL | Age: 54
Setting detail: RECURRING SERIES
Discharge: HOME | End: 2024-02-17
Payer: OTHER GOVERNMENT

## 2024-02-14 PROCEDURE — 97140 MANUAL THERAPY 1/> REGIONS: CPT

## 2024-02-14 PROCEDURE — 97112 NEUROMUSCULAR REEDUCATION: CPT

## 2024-02-14 PROCEDURE — 97530 THERAPEUTIC ACTIVITIES: CPT

## 2024-02-14 PROCEDURE — 97110 THERAPEUTIC EXERCISES: CPT

## 2024-02-14 NOTE — PROGRESS NOTES
PHYSICAL / OCCUPATIONAL THERAPY - DAILY TREATMENT NOTE    Patient Name: Bethany Scott    Date: 2024    : 1970  Insurance: Payor:  EAST / Plan:  EAST / Product Type: *No Product type* /      Patient  verified Yes     Visit #   Current / Total 6 10   Time   In / Out 456 530   Pain   In / Out 3 1   Subjective Functional Status/Changes: \"I felt good after last time.\"     TREATMENT AREA =  Pain in left foot [M79.672]    OBJECTIVE         Therapeutic Procedures:    Tx Min Billable or 1:1 Min (if diff from Tx Min) Procedure, Rationale, Specifics   10  88437 Therapeutic Exercise (timed):  increase ROM, strength, coordination, balance, and proprioception to improve patient's ability to progress to PLOF and address remaining functional goals. (see flow sheet as applicable)     Details if applicable:       8  26931 Neuromuscular Re-Education (timed):  improve balance, coordination, kinesthetic sense, posture, core stability and proprioception to improve patient's ability to develop conscious control of individual muscles and awareness of position of extremities in order to progress to PLOF and address remaining functional goals. (see flow sheet as applicable)     Details if applicable:     8  44321 Therapeutic Activity (timed):  use of dynamic activities replicating functional movements to increase ROM, strength, coordination, balance, and proprioception in order to improve patient's ability to progress to PLOF and address remaining functional goals.  (see flow sheet as applicable)     Details if applicable:     8  60883 Manual Therapy (timed):  decrease pain, increase ROM, increase tissue extensibility, decrease trigger points, and increase postural awareness to improve patient's ability to progress to PLOF and address remaining functional goals.  The manual therapy interventions were performed at a separate and distinct time from the therapeutic activities interventions . (see flow sheet as

## 2024-02-16 ENCOUNTER — HOSPITAL ENCOUNTER (OUTPATIENT)
Facility: HOSPITAL | Age: 54
Setting detail: RECURRING SERIES
Discharge: HOME | End: 2024-02-19
Payer: OTHER GOVERNMENT

## 2024-02-16 PROCEDURE — 97112 NEUROMUSCULAR REEDUCATION: CPT

## 2024-02-16 PROCEDURE — 97110 THERAPEUTIC EXERCISES: CPT

## 2024-02-16 PROCEDURE — 97140 MANUAL THERAPY 1/> REGIONS: CPT

## 2024-02-16 NOTE — PROGRESS NOTES
assessed: 02/09/2024]        3.  Pt will be able to perform 30/30 unilateral calf raise bilaterally to allow for increased ease with gait and stair descent.  Status at IE : unable  Assess at NV [Date assessed: 02/09/2024]        4.  Pt will report being able to walk/stand for >30 minutes without increased symptoms to allow for improved tolerance with activities such as grocery shopping and household cleaning.  Status at IE : unable  Making progress [Date assessed: 02/09/2024]    Next PN/ RC due 02/23/2024  Auth due (visit number/ date) 8 more by 05/16/2024    PLAN  - Continue Plan of Care  - Upgrade activities as tolerated    Vincenzo Sung PTA, [facility]    2/16/2024    10:24 AM    Future Appointments   Date Time Provider Department Center   2/21/2024  4:50 PM Vincenzo Sung, [facility]   2/23/2024 10:50 AM Malissa Saldaña PTA [facility]   2/28/2024  4:50 PM Vincenzo Sung PT [facility]

## 2024-02-21 ENCOUNTER — HOSPITAL ENCOUNTER (OUTPATIENT)
Facility: HOSPITAL | Age: 54
Setting detail: RECURRING SERIES
Discharge: HOME | End: 2024-02-24
Payer: OTHER GOVERNMENT

## 2024-02-21 PROCEDURE — 97110 THERAPEUTIC EXERCISES: CPT

## 2024-02-21 PROCEDURE — 97140 MANUAL THERAPY 1/> REGIONS: CPT

## 2024-02-21 PROCEDURE — 97530 THERAPEUTIC ACTIVITIES: CPT

## 2024-02-21 PROCEDURE — 97112 NEUROMUSCULAR REEDUCATION: CPT

## 2024-02-21 NOTE — PROGRESS NOTES
PHYSICAL / OCCUPATIONAL THERAPY - DAILY TREATMENT NOTE    Patient Name: Bethany Scott    Date: 2024    : 1970  Insurance: Payor:  EAST / Plan:  EAST / Product Type: *No Product type* /      Patient  verified Yes     Visit #   Current / Total 8 10   Time   In / Out 449 534   Pain   In / Out 5 3   Subjective Functional Status/Changes: \"I'm hurting. I've been hurting since Monday. I've been doing a lot of standing at work.\"     TREATMENT AREA =  Pain in left foot [M79.672]    OBJECTIVE         Therapeutic Procedures:    Tx Min Billable or 1:1 Min (if diff from Tx Min) Procedure, Rationale, Specifics   10  01982 Therapeutic Exercise (timed):  increase ROM, strength, coordination, balance, and proprioception to improve patient's ability to progress to PLOF and address remaining functional goals. (see flow sheet as applicable)     Details if applicable:       17  53093 Neuromuscular Re-Education (timed):  improve balance, coordination, kinesthetic sense, posture, core stability and proprioception to improve patient's ability to develop conscious control of individual muscles and awareness of position of extremities in order to progress to PLOF and address remaining functional goals. (see flow sheet as applicable)     Details if applicable:     10  18046 Therapeutic Activity (timed):  use of dynamic activities replicating functional movements to increase ROM, strength, coordination, balance, and proprioception in order to improve patient's ability to progress to PLOF and address remaining functional goals.  (see flow sheet as applicable)     Details if applicable:     8  67821 Manual Therapy (timed):  decrease pain, increase ROM, increase tissue extensibility, decrease trigger points, and increase postural awareness to improve patient's ability to progress to PLOF and address remaining functional goals.  The manual therapy interventions were performed at a separate and distinct time from the

## 2024-02-23 ENCOUNTER — HOSPITAL ENCOUNTER (OUTPATIENT)
Facility: HOSPITAL | Age: 54
Setting detail: RECURRING SERIES
Discharge: HOME | End: 2024-02-26
Payer: OTHER GOVERNMENT

## 2024-02-23 PROCEDURE — 97535 SELF CARE MNGMENT TRAINING: CPT

## 2024-02-23 PROCEDURE — 97530 THERAPEUTIC ACTIVITIES: CPT

## 2024-02-23 NOTE — PROGRESS NOTES
[facility] [address]  [address]  [place] [address]  [phone] fax    PROGRESS NOTE  Patient Name: Bethany Scott : 1970   Treatment/Medical Diagnosis: Pain in left foot [M79.672]   Referral Source: Gary Oliveira DPM     Date of Initial Visit: 2024 Attended Visits: 9 Missed Visits: 0     SUMMARY OF TREATMENT  Ms. Scott reports 30-40% improvement since starting therapy stating her muscles aren't as tight, has noticed improvement in muscle tone and strength, she is bending forward and squatting better, noted mild improvement in balance, and increased exercise tolerance. She continues to have difficulty with continued left sided pain, spending time with family, sleep difficulty, muscle cramping, and performing household chores. She has made progress with exercise tolerance and standing tolerance, but continues to be limited by fluctuating pain and will benefit from further therapy to improve her ability to manage symptoms.     CURRENT STATUS  Short Term Goals: To be accomplished in 4 weeks   Pt will be independent with HEP to self manage symptoms post d/c to prevent reoccurrence of injury.  Status at IE : initial  Compliant, update NV in preparation for DC     Long Term Goals: To be accomplished in 6 weeks   Pt will achieve FOTO goal to signify improved functional status with ADLs.  Status at IE : initial  No change: 36     2.  Pt will reports <2/10 pain at worst with ADLs to highlight an improved QoL.  Status at IE : >2/10 at worst  No change: 10/10 at worst     3.  Pt will be able to perform 30/30 unilateral calf raise bilaterally to allow for increased ease with gait and stair descent.  Status at IE : unable  Progressin on left, 20 on right     4.  Pt will report being able to walk/stand for >30 minutes without increased symptoms to allow for improved tolerance with activities such as grocery shopping and household cleaning.  Status 
mobility deficits, analyze and address ROM deficits, analyze and address strength deficits, analyze and address soft tissue restrictions, analyze and cue for proper movement patterns, analyze and modify for postural abnormalities, analyze and address imbalance/dizziness, and instruct in home and community integration to address functional deficits and attain remaining goals.    Progress toward goals / Updated goals:  []  See Progress Note/Recertification    Short Term Goals: To be accomplished in 4 weeks   Pt will be independent with HEP to self manage symptoms post d/c to prevent reoccurrence of injury.  Status at IE : initial  Compliant, update NV in preparation for DC     Long Term Goals: To be accomplished in 6 weeks   Pt will achieve FOTO goal to signify improved functional status with ADLs.  Status at IE : initial  No change: 36     2.  Pt will reports <2/10 pain at worst with ADLs to highlight an improved QoL.  Status at IE : >2/10 at worst  No change: 10/10 at worst     3.  Pt will be able to perform 30/30 unilateral calf raise bilaterally to allow for increased ease with gait and stair descent.  Status at IE : unable  Progressin on left, 20 on right     4.  Pt will report being able to walk/stand for >30 minutes without increased symptoms to allow for improved tolerance with activities such as grocery shopping and household cleaning.  Status at IE : unable  Progressin minutes standing tolerance prior to increase of pain    Functional Gains: muscles aren't as tight, improvement in muscle tone and strength, bending forward and squatting better, mild improvement in balance, increased exercise tolerance  Functional Deficits: continued left sided pain, spending time with family, sleep difficulty, muscle cramping, household chores  % improvement: 30-40%  Pain   Average: 5/10       Best: 2/10     Worst: 10/10  Patient Goal: \"Decrease in pain, feel confident in body mechanics to start working out again\"

## 2024-02-28 ENCOUNTER — HOSPITAL ENCOUNTER (OUTPATIENT)
Facility: HOSPITAL | Age: 54
Setting detail: RECURRING SERIES
Discharge: HOME | End: 2024-03-02
Payer: OTHER GOVERNMENT

## 2024-02-28 PROCEDURE — 97535 SELF CARE MNGMENT TRAINING: CPT

## 2024-02-28 PROCEDURE — 97112 NEUROMUSCULAR REEDUCATION: CPT

## 2024-02-28 PROCEDURE — 97530 THERAPEUTIC ACTIVITIES: CPT

## 2024-02-28 PROCEDURE — 97110 THERAPEUTIC EXERCISES: CPT

## 2024-02-28 NOTE — PROGRESS NOTES
PHYSICAL / OCCUPATIONAL THERAPY - DAILY TREATMENT NOTE    Patient Name: Bethany Scott    Date: 2024    : 1970  Insurance: Payor:  EAST / Plan:  EAST / Product Type: *No Product type* /      Patient  verified Yes     Visit #   Current / Total 1 6   Time   In / Out 450 532   Pain   In / Out 3-4 3-4   Subjective Functional Status/Changes: \"I'm going to go on hold until I see the doctor.\"     TREATMENT AREA =  Pain in left foot [M79.672]    OBJECTIVE         Therapeutic Procedures:    Tx Min Billable or 1:1 Min (if diff from Tx Min) Procedure, Rationale, Specifics   12  74810 Therapeutic Exercise (timed):  increase ROM, strength, coordination, balance, and proprioception to improve patient's ability to progress to PLOF and address remaining functional goals. (see flow sheet as applicable)     Details if applicable:       12  72004 Neuromuscular Re-Education (timed):  improve balance, coordination, kinesthetic sense, posture, core stability and proprioception to improve patient's ability to develop conscious control of individual muscles and awareness of position of extremities in order to progress to PLOF and address remaining functional goals. (see flow sheet as applicable)     Details if applicable:     10  95378 Therapeutic Activity (timed):  use of dynamic activities replicating functional movements to increase ROM, strength, coordination, balance, and proprioception in order to improve patient's ability to progress to PLOF and address remaining functional goals.  (see flow sheet as applicable)     Details if applicable:     8  39859 Self Care/Home Management (timed):  improve patient knowledge and understanding of pain reducing techniques, positioning, posture/ergonomics, home safety, activity modification, diagnosis/prognosis, and physical therapy expectations, procedures and progression  to improve patient's ability to progress to PLOF and address remaining functional goals.  (see

## 2024-03-26 ENCOUNTER — HOSPITAL ENCOUNTER (OUTPATIENT)
Facility: HOSPITAL | Age: 54
Setting detail: RECURRING SERIES
Discharge: HOME | End: 2024-03-29
Payer: OTHER GOVERNMENT

## 2024-03-26 PROCEDURE — 97535 SELF CARE MNGMENT TRAINING: CPT

## 2024-03-26 PROCEDURE — 97530 THERAPEUTIC ACTIVITIES: CPT

## 2024-03-26 NOTE — PROGRESS NOTES
PHYSICAL / OCCUPATIONAL THERAPY - DAILY TREATMENT NOTE    Patient Name: Bethany Scott    Date: 3/26/2024    : 1970  Insurance: Payor:  EAST / Plan:  EAST / Product Type: *No Product type* /      Patient  verified Yes     Visit #   Current / Total 2 6   Time   In / Out 452 509   Pain   In / Out 1-2 2   Subjective Functional Status/Changes: \"I want to discharge, I have so many other issues in my leg going on\"     TREATMENT AREA =  Pain in left foot [M79.672]    OBJECTIVE         Therapeutic Procedures:    Tx Min Billable or 1:1 Min (if diff from Tx Min) Procedure, Rationale, Specifics   9  59805 Therapeutic Activity (timed):  use of dynamic activities replicating functional movements to increase ROM, strength, coordination, balance, and proprioception in order to improve patient's ability to progress to PLOF and address remaining functional goals.  (see flow sheet as applicable)     Details if applicable:     8  30108 Self Care/Home Management (timed):  improve patient knowledge and understanding of pain reducing techniques, positioning, posture/ergonomics, home safety, activity modification, diagnosis/prognosis, and physical therapy expectations, procedures and progression  to improve patient's ability to progress to PLOF and address remaining functional goals.  (see flow sheet as applicable)     Details if applicable:  work ergonomics and self-taping technique instruction    17  [facility] Totals Reminder: bill using total billable min of TIMED therapeutic procedures (example: do not include dry needle or estim unattended, both untimed codes, in totals to left)  8-22 min = 1 unit; 23-37 min = 2 units; 38-52 min = 3 units; 53-67 min = 4 units; 68-82 min = 5 units   Total Total     [x]  Patient Education billed concurrently with other procedures   [x] Review HEP    [] Progressed/Changed HEP, detail:    [] Other detail:       Objective Information/Functional Measures/Assessment    Pt attended 11

## 2024-03-26 NOTE — PROGRESS NOTES
[facility] [address]  [address]  [place] [address]  [phone] fax    DISCHARGE SUMMARY  Patient Name: Bethany Scott : 1970   Treatment/Medical Diagnosis: Pain in left foot [M79.672]   Referral Source: Gary Oliveira DPM     Date of Initial Visit: 24 Attended Visits: 11 Missed Visits: 0     SUMMARY OF TREATMENT  Pt attended 11 visits consistently and made steady progress with skilled physical therapy services.  At time of last visit, Pt reported the following:  Functional Gains - improved flexibility, decreased pain in the morning, knows how to self manage pain better; Functional Deficits - continued pain in the afternoon/ after work, stilll having to take breaks throughout the day due to pain, decreased standing tolerance, overall poor left leg flexibility/ tightness; and 75% improvement since start of care.  Pt has met or is progressing towards all goals and is compliant with comprehensive HEP.  Pt is appropriate for D/C at this time to continue to manage care independently and maintain long term gains made with skilled therapy.   CURRENT STATUS   Pt will be independent with HEP to self manage symptoms post d/c to prevent reoccurrence of injury.  PN Status: Compliant, update NV in preparation for DC or being placed on hold until she sees MD.   Status at DC: MET; (3/26/24) patient reporting daily compliance     Pt will achieve FOTO goal to signify improved functional status with ADLs.  PN Status: No change: 36   Status at DC: MET; 56/100 POINTS(3/26/24)   3.  Pt will reports <2/10 pain at worst with ADLs to highlight an improved QoL.  PN Status: No change: 10/10 at worst  Status at DC: (3/26/24) Not met;  8/10 at worst      4.  Pt will be able to perform 30/30 unilateral calf raise bilaterally to allow for increased ease with gait and stair descent.  PN Status: Progressin on left, 20 on right   Status at DC: Not met; (3/26/24) 25

## 2024-10-09 ENCOUNTER — HOSPITAL ENCOUNTER (OUTPATIENT)
Facility: HOSPITAL | Age: 54
Discharge: HOME | End: 2024-10-12
Payer: OTHER GOVERNMENT

## 2024-10-09 VITALS — WEIGHT: 154 LBS | HEIGHT: 64 IN | BODY MASS INDEX: 26.29 KG/M2

## 2024-10-09 DIAGNOSIS — Z12.31 ENCOUNTER FOR SCREENING MAMMOGRAM FOR HIGH-RISK PATIENT: ICD-10-CM

## 2024-10-09 PROCEDURE — 77063 BREAST TOMOSYNTHESIS BI: CPT

## (undated) DEVICE — NEEDLE HYPO 25GA L1.5IN BVL ORIENTED ECLIPSE

## (undated) DEVICE — Device

## (undated) DEVICE — TROCAR: Brand: KII® OPTICAL ACCESS SYSTEM

## (undated) DEVICE — ELECTRO LUBE IS A SINGLE PATIENT USE DEVICE THAT IS INTENDED TO BE USED ON ELECTROSURGICAL ELECTRODES TO REDUCE STICKING.: Brand: KEY SURGICAL ELECTRO LUBE

## (undated) DEVICE — SPONGE HEMOSTAT CELLULS 4X8IN -- SURGICEL

## (undated) DEVICE — SUTURE VCRL SZ 3-0 L27IN ABSRB UD L26MM SH 1/2 CIR J416H

## (undated) DEVICE — LAPAROSCOPIC TROCAR SLEEVE/SINGLE USE: Brand: KII® OPTICAL ACCESS SYSTEM

## (undated) DEVICE — SCISSORS ENDOSCP DIA5MM CRV MPLR CAUT W/ RATCH HNDL

## (undated) DEVICE — SOLUTION IV 1000ML 0.9% SOD CHL

## (undated) DEVICE — 3M™ IOBAN™ 2 ANTIMICROBIAL INCISE DRAPE 6651EZ: Brand: IOBAN™ 2

## (undated) DEVICE — GLOVE SURG SZ 8 L11.77IN FNGR THK9.8MIL STRW LTX POLYMER

## (undated) DEVICE — MASTISOL ADHESIVE LIQ 2/3ML

## (undated) DEVICE — SMOKE EVACUATION PENCIL: Brand: VALLEYLAB

## (undated) DEVICE — INTENDED FOR TISSUE SEPARATION, AND OTHER PROCEDURES THAT REQUIRE A SHARP SURGICAL BLADE TO PUNCTURE OR CUT.: Brand: BARD-PARKER ®  SAFETY SCALPED

## (undated) DEVICE — DRAPE TOWEL: Brand: CONVERTORS

## (undated) DEVICE — DRAPE TWL SURG 16X26IN BLU ORB04] ALLCARE INC]

## (undated) DEVICE — MAYO STAND COVER: Brand: CONVERTORS

## (undated) DEVICE — MEDI-VAC NON-CONDUCTIVE SUCTION TUBING: Brand: CARDINAL HEALTH

## (undated) DEVICE — COVER LT HNDL BLU STRL -- MEDICHOICE

## (undated) DEVICE — GLOVE SURG BIOGEL 8.0 STRL -- SKINSENSE

## (undated) DEVICE — GOWN,SIRUS,POLYRNF,SETINSLV,XL,20/CS: Brand: MEDLINE

## (undated) DEVICE — GARMENT,MEDLINE,DVT,INT,CALF,MED, GEN2: Brand: MEDLINE

## (undated) DEVICE — KIT CLN UP BON SECOURS MARYV

## (undated) DEVICE — SOFT SILICONE HYDROCELLULAR SACRUM DRESSING WITH LOCK AWAY LAYER: Brand: ALLEVYN LIFE SACRUM (LARGE) PACK OF 10

## (undated) DEVICE — BLANKET WRM AD W50XL85.8IN PACU FULL BODY FORC AIR

## (undated) DEVICE — ARM DRAPE

## (undated) DEVICE — REM POLYHESIVE ADULT PATIENT RETURN ELECTRODE: Brand: VALLEYLAB

## (undated) DEVICE — CARTRIDGE CLP LIG HEMLOK GRN --

## (undated) DEVICE — STERILE POLYISOPRENE POWDER-FREE SURGICAL GLOVES: Brand: PROTEXIS

## (undated) DEVICE — FLEX ADVANTAGE 3000CC: Brand: FLEX ADVANTAGE

## (undated) DEVICE — (D)BNDG ADHESIVE FABRIC 3/4X3 -- DISC BY MFR USE ITEM 357960

## (undated) DEVICE — SUTURE MCRYL SZ 4-0 L27IN ABSRB UD L24MM PS-1 3/8 CIR PRIM Y935H

## (undated) DEVICE — SUTURE VCRL SZ 0 L18IN ABSRB UD POLYGLACTIN 910 BRAID TIE J912G

## (undated) DEVICE — COVER,LIGHT HANDLE,FLX,1/PK: Brand: MEDLINE INDUSTRIES, INC.

## (undated) DEVICE — INSTRMT SET WND CLSR SUT PASS --

## (undated) DEVICE — PREP SKN CHLRAPRP APL 26ML STR --

## (undated) DEVICE — BLADELESS OBTURATOR: Brand: WECK VISTA

## (undated) DEVICE — SEAL UNIV 5-8MM DISP BX/10 -- DA VINCI XI - SNGL USE

## (undated) DEVICE — COLUMN DRAPE

## (undated) DEVICE — TIP COVER ACCESSORY

## (undated) DEVICE — SYR 10ML LUER LOK 1/5ML GRAD --

## (undated) DEVICE — (D)PACK ICE DISP -- DISC BY MFR